# Patient Record
Sex: FEMALE | Race: WHITE | NOT HISPANIC OR LATINO | Employment: FULL TIME | URBAN - METROPOLITAN AREA
[De-identification: names, ages, dates, MRNs, and addresses within clinical notes are randomized per-mention and may not be internally consistent; named-entity substitution may affect disease eponyms.]

---

## 2017-01-10 ENCOUNTER — OFFICE VISIT (OUTPATIENT)
Dept: BEHAVIORAL HEALTH | Facility: CLINIC | Age: 27
End: 2017-01-10
Payer: COMMERCIAL

## 2017-01-10 DIAGNOSIS — F34.1 PERSISTENT DEPRESSIVE DISORDER WITH ANXIOUS DISTRESS, CURRENTLY MILD: Primary | ICD-10-CM

## 2017-01-10 PROCEDURE — 90834 PSYTX W PT 45 MINUTES: CPT | Performed by: MARRIAGE & FAMILY THERAPIST

## 2017-01-21 NOTE — PROGRESS NOTES
[facility] [place] [facility]   January 10, 2017      Behavioral Health Clinician Progress Note    Patient Name: Melissa Macdonald           Service Type: Individual      Service Location:   Face to Face in Clinic     Session Start Time: 4:00  Session End Time: 5:00      Session Length: 53 - 60      Attendees: Patient    Visit Activities (Refresh list every visit): [facility] Only    Diagnostic Assessment Date: 11/8/16  Treatment Plan Review Date: 11/22/16   See Flowsheets for today's PHQ-9 and VICKY-7 results  Previous PHQ-9: No flowsheet data found.  Previous VICKY-7: No flowsheet data found.    ABBI LEVEL:  ABBI Score (Last Two) 10/31/2016   ABBI Raw Score 28   Activation Score 50   ABBI Level 2       DATA  Extended Session (60+ minutes): No  Interactive Complexity: No  Crisis: No    Treatment Objective(s) Addressed in This Session:  Target Behavior(s): disease management/lifestyle changes related to depression    Depressed Mood: Increase interest, engagement, and pleasure in doing things  Decrease frequency and intensity of feeling down, depressed, hopeless  Improve quantity and quality of night time sleep / decrease daytime naps  Feel less tired and more energy during the day   Improve diet, appetite, mindful eating, and / or meal planning  Identify negative self-talk and behaviors: challenge core beliefs, myths, and actions  Improve concentration, focus, and mindfulness in daily activities   Feel less fidgety, restless or slow in daily activities / interpersonal interactions    Current Stressors / Issues:  Patient reports was feeling overwhelmed by a cleaning project she took on while she had some vacation time off from work. Patient reports guest closest is filled with childhood and family artifacts that have been given to her by her mother. Patient reports she needs to go through these boxes and organize what she decides to keep. Patient reports  was not supportive in the way she needed him to be about this  "overwhelming situation and her overall impacts of depression symptoms. Patient reports she used the role play activity skill we reviewed in session to express and \"teach\"  what she needed in that time of emotional distress. Patient processed theme of not being \"taken care of\" by people in her personal life, and instead being the one that is caring for them. Patient reports one area where she feels she gets the \"care\" support is from her work environment and co-workers.         Progress on Treatment Objective(s) / Homework:  New Objective established this session - ACTION (Actively working towards change); Intervened by reinforcing change plan / affirming steps taken    Cognitive Behavioral Therapy   1. Patient to keep thought log sheet and journal   2. Patient to verbally acknowledge 's positive actions taken on a daily basis  3. Patient to verbally acknowledge with  a task she has completed     Care Plan review completed: Yes    Medication Review:  No current psychiatric medications prescribed    Medication Compliance:  NA    Changes in Health Issues:   None reported    Chemical Use Review:   Substance Use: Chemical use reviewed, no active concerns identified      Tobacco Use: No current tobacco use.      Assessment: Current Emotional / Mental Status (status of significant symptoms):  Risk status (Self / Other harm or suicidal ideation)  Patient denies a history of suicidal ideation, suicide attempts, self-injurious behavior, homicidal ideation, homicidal behavior and and other safety concerns  Patient denies current fears or concerns for personal safety.  Patient denies current or recent suicidal ideation or behaviors.  Patient denies current or recent homicidal ideation or behaviors.  Patient denies current or recent self injurious behavior or ideation.  Patient denies other safety concerns.  Patient reports there are no firearms in the house  A safety and risk management plan has not been " developed at this time, however client was given the after-hours number / 911 should there be a change in any of these risk factors.    Appearance:   Appropriate   Eye Contact:   Good   Psychomotor Behavior: Normal   Attitude:   Cooperative   Orientation:   All  Speech   Rate / Production: Normal    Volume:  Normal   Mood:    Depressed   Affect:    Subdued  Worrisome  Crying   Thought Content:  Rumination   Thought Form:  Coherent  Logical   Insight:    Good     Diagnoses:  1. Persistent depressive disorder with anxious distress, currently mild        Collateral Reports Completed:  Not Applicable    Plan: (Homework, other):  Patient was given information about behavioral services and encouraged to schedule a follow up appointment with the clinic [facility] in 1 week.  She was also given Cognitive Behavioral Therapy skills to practice when experiencing depression.  CD Recommendations: No indications of CD issues.  PATRIC Spar[place], [facility]                                               Treatment Plan    Client's Name: Melissa Macdonald  YOB: 1990    Date: 11/22/16    DSM-V Diagnoses: 300.4 Persistent Depressive Disorder, Mild  Psychosocial / Contextual Factors: None  WHODAS: Patient did not complete     Referral / Collaboration:  Referral to another professional/service is not indicated at this time..    Anticipated number of session or this episode of care: 6-8      MeasurableTreatment Goal(s) related to diagnosis / functional impairment(s)  Goal 1: Client will experience decreased symptoms and improved coping skills    I will know I've met my goal when using healthy coping skills.      Objective #A (Client Action)    Client will identify 5 strategies for improving mood.  Status: Continued - Date(s):     Intervention(s)  Therapist will teach the client how to perform a behavioral chain analysis. CBT skills, automtic thoughts, posiitve behvaior activation, mindfulness skills.      Client has reviewed and  agreed to the above plan.      Keiko Joel  November 22, 2016

## 2017-04-07 ENCOUNTER — OFFICE VISIT (OUTPATIENT)
Dept: OBGYN | Facility: CLINIC | Age: 27
End: 2017-04-07
Payer: COMMERCIAL

## 2017-04-07 VITALS
TEMPERATURE: 97.6 F | WEIGHT: 187.4 LBS | DIASTOLIC BLOOD PRESSURE: 66 MMHG | BODY MASS INDEX: 29.41 KG/M2 | HEIGHT: 67 IN | SYSTOLIC BLOOD PRESSURE: 111 MMHG | HEART RATE: 77 BPM

## 2017-04-07 DIAGNOSIS — Z29.9 PREVENTIVE MEASURE: ICD-10-CM

## 2017-04-07 DIAGNOSIS — K64.4 EXTERNAL HEMORRHOIDS: Primary | ICD-10-CM

## 2017-04-07 DIAGNOSIS — N63.0 LUMP OR MASS IN BREAST: ICD-10-CM

## 2017-04-07 DIAGNOSIS — E56.9 VITAMIN DEFICIENCY: ICD-10-CM

## 2017-04-07 PROCEDURE — 99203 OFFICE O/P NEW LOW 30 MIN: CPT | Performed by: NURSE PRACTITIONER

## 2017-04-07 PROCEDURE — 36415 COLL VENOUS BLD VENIPUNCTURE: CPT | Performed by: NURSE PRACTITIONER

## 2017-04-07 PROCEDURE — 82306 VITAMIN D 25 HYDROXY: CPT | Performed by: NURSE PRACTITIONER

## 2017-04-07 RX ORDER — SULFAMETHOXAZOLE/TRIMETHOPRIM 800-160 MG
1 TABLET ORAL 2 TIMES DAILY
Qty: 6 TABLET | Refills: 0 | Status: SHIPPED | OUTPATIENT
Start: 2017-04-07 | End: 2017-04-10

## 2017-04-07 ASSESSMENT — PAIN SCALES - GENERAL: PAINLEVEL: NO PAIN (0)

## 2017-04-07 NOTE — MR AVS SNAPSHOT
"              After Visit Summary   4/7/2017    Melissa Macdonald    MRN: [id number]           Patient Information     Date Of Birth          1990        Visit Information        Provider Department      4/7/2017 8:30 AM Kim Calle APRN CNP [facility]        Today's Diagnoses     External hemorrhoids    -  1    Vitamin deficiency        Lump or mass in breast        Preventive measure           Follow-ups after your visit        Future tests that were ordered for you today     Open Future Orders        Priority Expected Expires Ordered    US Breast Left Complete 4 Quadrants Routine  4/7/2018 4/7/2017            Who to contact     If you have questions or need follow up information about today's clinic visit or your schedule please contact [facility] directly at [phone].  Normal or non-critical lab and imaging results will be communicated to you by [employer], letter or phone within 4 business days after the clinic has received the results. If you do not hear from us within 7 days, please contact the clinic through [employer] or phone. If you have a critical or abnormal lab result, we will notify you by phone as soon as possible.  Submit refill requests through [employer] or call your pharmacy and they will forward the refill request to us. Please allow 3 business days for your refill to be completed.          Additional Information About Your Visit        [employer] Information     [employer] lets you send messages to your doctor, view your test results, renew your prescriptions, schedule appointments and more. To sign up, go to [email or web address] . Click on \"Log in\" on the left side of the screen, which will take you to the Welcome page. Then click on \"Sign up Now\" on the right side of the page.     You will be asked to enter the access code listed below, as well as some personal information. Please follow the directions to create your username and password.     Your access " "code is: [email or web address]  Expires: 2017 10:00 AM     Your access code will  in 90 days. If you need help or a new code, please call your [place] clinic or [phone].        Care EveryWhere ID     This is your Care EveryWhere ID. This could be used by other organizations to access your [place] medical records  [id number]        Your Vitals Were     Pulse Temperature Height Last Period BMI (Body Mass Index)       77 97.6  F (36.4  C) (Oral) 5' 6.5\" (1.689 m) 2017 (Exact Date) 29.79 kg/m2        Blood Pressure from Last 3 Encounters:   17 111/66   10/31/16 116/71    Weight from Last 3 Encounters:   17 187 lb 6.4 oz (85 kg)   10/31/16 185 lb (83.9 kg)              We Performed the Following     Vitamin D Deficiency          Today's Medication Changes          These changes are accurate as of: 17 10:00 AM.  If you have any questions, ask your nurse or doctor.               Start taking these medicines.        Dose/Directions    sulfamethoxazole-trimethoprim 800-160 MG per tablet   Commonly known as:  BACTRIM DS/SEPTRA DS   Used for:  Preventive measure   Started by:  Kim Calle APRN CNP        Dose:  1 tablet   Take 1 tablet by mouth 2 times daily for 3 days   Quantity:  6 tablet   Refills:  0         These medicines have changed or have updated prescriptions.        Dose/Directions    TRI-LO-NATANAEL 0.18/0.215/0.25 MG-25 MCG per tablet   This may have changed:  Another medication with the same name was removed. Continue taking this medication, and follow the directions you see here.   Generic drug:  norgestim-eth estrad triphasic   Changed by:  Kristina Mccoy MD        Refills:  1            Where to get your medicines      These medications were sent to [facility] [address] [address], [address]  [address] [address], [facility] [address] [address]     Phone:  [phone]     sulfamethoxazole-trimethoprim 800-160 MG per tablet                " Primary Care Provider Office Phone # Fax #    Kristina Mccoy [phone]       [facility] [place] [address] [address] [address]        Thank you!     Thank you for choosing [facility]  for your care. Our goal is always to provide you with excellent care. Hearing back from our patients is one way we can continue to improve our services. Please take a few minutes to complete the written survey that you may receive in the mail after your visit with us. Thank you!             Your Updated Medication List - Protect others around you: Learn how to safely use, store and throw away your medicines at www.disposemymeds.org.          This list is accurate as of: 4/7/17 10:00 AM.  Always use your most recent med list.                   Brand Name Dispense Instructions for use    sulfamethoxazole-trimethoprim 800-160 MG per tablet    BACTRIM DS/SEPTRA DS    6 tablet    Take 1 tablet by mouth 2 times daily for 3 days       TRI-LO-NATANAEL 0.18/0.215/0.25 MG-25 MCG per tablet   Generic drug:  norgestim-eth estrad triphasic

## 2017-04-07 NOTE — NURSING NOTE
"Chief Complaint   Patient presents with     Breast Problem     Lump on left breast x 1 day     Mass     rectal area x 2 weeks       Initial /66  Pulse 77  Temp 97.6  F (36.4  C) (Oral)  Ht 5' 6.5\" (1.689 m)  Wt 187 lb 6.4 oz (85 kg)  LMP 03/14/2017 (Exact Date)  BMI 29.79 kg/m2 Estimated body mass index is 29.79 kg/(m^2) as calculated from the following:    Height as of this encounter: 5' 6.5\" (1.689 m).    Weight as of this encounter: 187 lb 6.4 oz (85 kg)..  BP completed using cuff size: phuong Hamlin CMA    "

## 2017-04-07 NOTE — PROGRESS NOTES
S: Melissa is a 26 year old  0 presenting today with multiple requests. First, was checking her entire body for ticks and noticed a lump near the anal opening. It is small, fleshy. There is no pain or discomfort in that area, no abnormal bleeding. Denies any issues with bowel movements, does not have to strain, goes regularly about once a day, or once every other day. No recent changes in diet, stress. Does drink adequate amount of fluid.   Second concern is a left breast lump she noticed this morning. Does do regular breast exams and this morning noticed the lump on the lower quadrant. It is tender if she touches it, but not otherwise. No noticeable skin changes, nipple discharge. The lump feels more elongated than circular. Her mother had a benign breast cyst at age 30, breast cancer diagnosed after menopause. Using combined oral contraceptive pills for contraception, currently on week 3 of her pack. Caffeine is about 1-2 cups coffee daily, minimal pop intake.   Third, patient requests a prescription for Bactrim DS. Will be traveling to [place] for 2 weeks and tends to get urinary tract infections when traveling. Takes it with her and only uses it if symptoms occur.   Last, patient requests Vitamin D screening. Has had low levels in the past and does supplement, but has not in a while. Patient medical, surgical, social, and family history reviewed and updated at today's visit. ROS: 10 point ROS neg other than the symptoms noted above in the HPI.    O: This is a well appearing female in no acute distress. Answers questions and maintains eye contact appropriately. Vital signs noted.  RESPIRATORY: Clear to auscultation bilaterally.  CV: Regular rate and rhythm without murmur, gallop, rub  (R) Breast:  without lesions  no palpable mass  no nipple discharge  no axillary adenopathy  no supraclavicular adenopathy  no infraclavicular adenopathy      (L) Breast:  without lesions  palpable mass: at approximately the  6:00 position 1 cm from the areola is a firm, fixed and slightly tender elongated lump. Feels more fibrous than cystic in nature.  no nipple discharge  no axillary adenopathy  no supraclavicular adenopathy  no infraclavicular adenopathy      ABDOMEN: Soft, nontender, nondistended, normoactive bowel sounds. No hepatosplenomegaly. No guarding, rebounding, or rigidity.  Vulva: No external lesions, normal hair distribution, no adenopathy  BUS:  Normal, no masses noted  Rectal: patient does have a small external hemorrhoid correlating to the area of concern. No palpable internal hemorrhoids.    A/P:  (K64.4) External hemorrhoids  (primary encounter diagnosis)  Comment: We discussed hemorrhoids and how they can occur, management, symptoms. As she is asymptomatic, no intervention at this time, but we discussed relief measures if it is bothersome. To continue adequate fluid intake and fiber. Return to clinic PRN if any bothersome symptoms occur.    (E56.9) Vitamin deficiency  Plan: Vitamin D Deficiency         (N63) Lump or mass in breast  Comment: We discussed her lump. As patient week 3 in her pill pack, can see if lump persists after cycle is done, but due to her family history, is concerned and we will have her schedule a left breast ultrasound. Follow up based on results.  Plan: US Breast Left Complete 4 Quadrants         (Z29.9) Preventive measure  Comment: Prescription given, patient encouraged to only use if needed.  Plan: sulfamethoxazole-trimethoprim (BACTRIM         DS/SEPTRA DS) 800-160 MG per tablet        Kim MEZA CNP

## 2017-04-07 NOTE — LETTER
[facility]  [address] [facility] [address]  [phone]        April 11, 2017    Melissa Macdonald                                                                                                            [facility] [address] [facility] [address] [address]              Dear Melissa,      Your vitamin D level is normal. Please let me know if you have any questions.         Sincerely,     Kim MEZA CNP

## 2017-04-09 LAB — DEPRECATED CALCIDIOL+CALCIFEROL SERPL-MC: 42 UG/L (ref 20–75)

## 2017-04-21 ENCOUNTER — RADIANT APPOINTMENT (OUTPATIENT)
Dept: ULTRASOUND IMAGING | Facility: CLINIC | Age: 27
End: 2017-04-21
Attending: NURSE PRACTITIONER
Payer: COMMERCIAL

## 2017-04-21 DIAGNOSIS — N63.0 LUMP OR MASS IN BREAST: ICD-10-CM

## 2017-04-21 PROCEDURE — 76642 ULTRASOUND BREAST LIMITED: CPT | Mod: LT | Performed by: RADIOLOGY

## 2017-08-08 ENCOUNTER — OFFICE VISIT (OUTPATIENT)
Dept: OBGYN | Facility: CLINIC | Age: 27
End: 2017-08-08
Payer: COMMERCIAL

## 2017-08-08 VITALS
TEMPERATURE: 97.2 F | BODY MASS INDEX: 29.44 KG/M2 | HEIGHT: 67 IN | HEART RATE: 82 BPM | SYSTOLIC BLOOD PRESSURE: 121 MMHG | WEIGHT: 187.6 LBS | DIASTOLIC BLOOD PRESSURE: 78 MMHG

## 2017-08-08 DIAGNOSIS — Z31.69 PRE-CONCEPTION COUNSELING: Primary | ICD-10-CM

## 2017-08-08 PROCEDURE — 86787 VARICELLA-ZOSTER ANTIBODY: CPT | Performed by: NURSE PRACTITIONER

## 2017-08-08 PROCEDURE — 86762 RUBELLA ANTIBODY: CPT | Performed by: NURSE PRACTITIONER

## 2017-08-08 PROCEDURE — 99214 OFFICE O/P EST MOD 30 MIN: CPT | Performed by: NURSE PRACTITIONER

## 2017-08-08 PROCEDURE — 36415 COLL VENOUS BLD VENIPUNCTURE: CPT | Performed by: NURSE PRACTITIONER

## 2017-08-08 ASSESSMENT — PAIN SCALES - GENERAL: PAINLEVEL: NO PAIN (0)

## 2017-08-08 NOTE — PROGRESS NOTES
S: Melissa is a 26 year old  0 presenting today with her  to discuss preconception questions. They are still deciding if they want to have children at all and continue to be back and forth on this decision. She has questions that she feels knowing answers would help them make their decision. Currently on oral contraceptive pills and has been on some type of oral contraceptive pill for about 10 years. Prior to being on pills, believes her cycles were regular. Overall, she is healthy with no chronic medical conditions, same for her . She had a varicella vaccine, however this was in the mid  and she would like a titer drawn, believes she had Rubella vaccination. She is not a smoker, drinks alcohol in moderation. She is starting to work on weight loss and would hope to have some success prior to attempting to conceive. Has questions related to her risks for GDM, pre-eclampsia due to her weight. Patient medical, surgical, social, and family history reviewed and updated at today's visit. ROS: 10 point ROS neg other than the symptoms noted above in the HPI.    O: This is a well appearing female in no acute distress. Answers questions and maintains eye contact appropriately. Vital signs noted.    A/P:  (Z31.69) Pre-conception counseling  (primary encounter diagnosis)  Comment: We discussed all of her questions at length and her questions are answered to her satisfaction. Check titers below. We discussed when to start PNV if they do choose to proceed with pregnancy. Discussed her questions related to a variety of pregnancy related complications. Reviewed tracking cycles, timing intercourse, management of irregular menstrual cycles, when to start trying after discontinuing oral contraceptive pills. Patient encouraged to call or email with any additional questions that arise.   Plan: Varicella Zoster Virus Antibody IgG, Rubella         Antibody IgG Quantitative  Approximately 25 minutes face to face  time was spent with the patient today entirely in education and counseling regarding pre-conception concerns.          Kim MEZA CNP

## 2017-08-08 NOTE — MR AVS SNAPSHOT
"              After Visit Summary   8/8/2017    Melissa Macdonald    MRN: [id number]           Patient Information     Date Of Birth          1990        Visit Information        Provider Department      8/8/2017 8:50 AM Kim Calle APRN CNP [facility]        Today's Diagnoses     Pre-conception counseling    -  1       Follow-ups after your visit        Who to contact     If you have questions or need follow up information about today's clinic visit or your schedule please contact [facility] directly at [phone].  Normal or non-critical lab and imaging results will be communicated to you by Appointuithart, letter or phone within 4 business days after the clinic has received the results. If you do not hear from us within 7 days, please contact the clinic through [employer] or phone. If you have a critical or abnormal lab result, we will notify you by phone as soon as possible.  Submit refill requests through [employer] or call your pharmacy and they will forward the refill request to us. Please allow 3 business days for your refill to be completed.          Additional Information About Your Visit        MyChart Information     [employer] gives you secure access to your electronic health record. If you see a primary care provider, you can also send messages to your care team and make appointments. If you have questions, please call your primary care clinic.  If you do not have a primary care provider, please call [phone] and they will assist you.        Care EveryWhere ID     This is your Care EveryWhere ID. This could be used by other organizations to access your [place] medical records  [id number]        Your Vitals Were     Pulse Temperature Height Last Period BMI (Body Mass Index)       82 97.2  F (36.2  C) (Oral) 5' 6.5\" (1.689 m) 07/31/2017 (Exact Date) 29.83 kg/m2        Blood Pressure from Last 3 Encounters:   08/08/17 121/78   04/07/17 111/66   10/31/16 116/71    Weight " from Last 3 Encounters:   08/08/17 187 lb 9.6 oz (85.1 kg)   04/07/17 187 lb 6.4 oz (85 kg)   10/31/16 185 lb (83.9 kg)              We Performed the Following     Rubella Antibody IgG Quantitative     Varicella Zoster Virus Antibody IgG        Primary Care Provider Office Phone # Fax #    Kristina Adriane Mccoy [phone]       [facility] [address] [facility] [address]        Equal Access to Services     LEIGH ANN DOYLE : Hadii aad ku hadasho Soomaali, waaxda luqadaha, qaybta kaalmada adeegyada, waxay idiin hayaan adeeg serafinarachris reed . So [facility] [phone].    ATENCIÓN: Si habla español, tiene a alberto disposición servicios gratuitos de asistencia lingüística. Llame al [phone].    We comply with applicable federal civil rights laws and [place] laws. We do not discriminate on the basis of race, color, national origin, age, disability sex, sexual orientation or gender identity.            Thank you!     Thank you for choosing [facility]  for your care. Our goal is always to provide you with excellent care. Hearing back from our patients is one way we can continue to improve our services. Please take a few minutes to complete the written survey that you may receive in the mail after your visit with us. Thank you!             Your Updated Medication List - Protect others around you: Learn how to safely use, store and throw away your medicines at www.disposemymeds.org.          This list is accurate as of: 8/8/17  9:53 AM.  Always use your most recent med list.                   Brand Name Dispense Instructions for use Diagnosis    TRI-LO-NATANAEL 0.18/0.215/0.25 MG-25 MCG per tablet   Generic drug:  norgestim-eth estrad triphasic

## 2017-08-08 NOTE — NURSING NOTE
"Chief Complaint   Patient presents with     Counseling     Preconception       Initial /78  Pulse 82  Temp 97.2  F (36.2  C) (Oral)  Ht 5' 6.5\" (1.689 m)  Wt 187 lb 9.6 oz (85.1 kg)  LMP 07/31/2017 (Exact Date)  BMI 29.83 kg/m2 Estimated body mass index is 29.83 kg/(m^2) as calculated from the following:    Height as of this encounter: 5' 6.5\" (1.689 m).    Weight as of this encounter: 187 lb 9.6 oz (85.1 kg)..  BP completed using cuff size: phuong Hamlin CMA    "

## 2017-08-10 LAB
RUBV IGG SERPL IA-ACNC: 46 IU/ML
VZV IGG SER QL IA: 3.5 AI (ref 0–0.8)

## 2017-09-21 ENCOUNTER — OFFICE VISIT (OUTPATIENT)
Dept: FAMILY MEDICINE | Facility: CLINIC | Age: 27
End: 2017-09-21
Payer: COMMERCIAL

## 2017-09-21 VITALS
WEIGHT: 187 LBS | DIASTOLIC BLOOD PRESSURE: 75 MMHG | HEIGHT: 67 IN | SYSTOLIC BLOOD PRESSURE: 109 MMHG | OXYGEN SATURATION: 99 % | BODY MASS INDEX: 29.35 KG/M2 | HEART RATE: 71 BPM

## 2017-09-21 DIAGNOSIS — D23.9 DERMATOFIBROMA: Primary | ICD-10-CM

## 2017-09-21 DIAGNOSIS — Z87.440 H/O RECURRENT URINARY TRACT INFECTION: ICD-10-CM

## 2017-09-21 PROCEDURE — 99213 OFFICE O/P EST LOW 20 MIN: CPT | Performed by: PHYSICIAN ASSISTANT

## 2017-09-21 RX ORDER — NITROFURANTOIN 25; 75 MG/1; MG/1
100 CAPSULE ORAL 2 TIMES DAILY
Qty: 14 CAPSULE | Refills: 0 | Status: SHIPPED | OUTPATIENT
Start: 2017-09-21 | End: 2017-10-20

## 2017-09-21 RX ORDER — PRENATAL VIT/IRON FUM/FOLIC AC 27MG-0.8MG
1 TABLET ORAL DAILY
COMMUNITY
End: 2018-02-01

## 2017-09-21 NOTE — MR AVS SNAPSHOT
"              After Visit Summary   9/21/2017    Melissa Macdonald    MRN: [id number]           Patient Information     Date Of Birth          1990        Visit Information        Provider Department      9/21/2017 5:10 PM Naun Lin PA-C [facility]        Today's Diagnoses     Dermatofibroma    -  1    H/O recurrent urinary tract infection           Follow-ups after your visit        Who to contact     If you have questions or need follow up information about today's clinic visit or your schedule please contact [facility] directly at [phone].  Normal or non-critical lab and imaging results will be communicated to you by "[employer]hart, letter or phone within 4 business days after the clinic has received the results. If you do not hear from us within 7 days, please contact the clinic through [employer] or phone. If you have a critical or abnormal lab result, we will notify you by phone as soon as possible.  Submit refill requests through [employer] or call your pharmacy and they will forward the refill request to us. Please allow 3 business days for your refill to be completed.          Additional Information About Your Visit        MyChart Information     [employer] gives you secure access to your electronic health record. If you see a primary care provider, you can also send messages to your care team and make appointments. If you have questions, please call your primary care clinic.  If you do not have a primary care provider, please call [phone] and they will assist you.        Care EveryWhere ID     This is your Care EveryWhere ID. This could be used by other organizations to access your [place] medical records  [id number]        Your Vitals Were     Pulse Height Pulse Oximetry BMI (Body Mass Index)          71 5' 6.5\" (1.689 m) 99% 29.73 kg/m2         Blood Pressure from Last 3 Encounters:   09/21/17 109/75   08/08/17 121/78   04/07/17 111/66    Weight from Last 3 " Encounters:   09/21/17 187 lb (84.8 kg)   08/08/17 187 lb 9.6 oz (85.1 kg)   04/07/17 187 lb 6.4 oz (85 kg)              Today, you had the following     No orders found for display         Today's Medication Changes          These changes are accurate as of: 9/21/17  5:19 PM.  If you have any questions, ask your nurse or doctor.               Start taking these medicines.        Dose/Directions    nitroFURantoin (macrocrystal-monohydrate) 100 MG capsule   Commonly known as:  MACROBID   Used for:  H/O recurrent urinary tract infection   Started by:  Naun Lin PA-C        Dose:  100 mg   Take 1 capsule (100 mg) by mouth 2 times daily   Quantity:  14 capsule   Refills:  0            Where to get your medicines      These medications were sent to [facility] [address]  [address] [address] [facility], [place] [address], [facility] [address]     Phone:  [phone]     nitroFURantoin (macrocrystal-monohydrate) 100 MG capsule                Primary Care Provider Office Phone # Fax #    Kristina Mccoy [phone] [address] [address]        Equal Access to Services     LEIGH ANN DOYLE : Hadii sage stein hadasho Soomaali, waaxda luqadaha, qaybta kaalmada adeegyada, waxay deana haymargarette wahl. So [facility] [phone].    ATENCIÓN: Si habla español, tiene a alberto disposición servicios gratuitos de asistencia lingüística. Llame al [phone].    We comply with applicable federal civil rights laws and [place] laws. We do not discriminate on the basis of race, color, national origin, age, disability sex, sexual orientation or gender identity.            Thank you!     Thank you for choosing [facility]  for your care. Our goal is always to provide you with excellent care. Hearing back from our patients is one way we can continue to improve our services. Please take a few minutes to complete the written survey that you may  receive in the mail after your visit with us. Thank you!             Your Updated Medication List - Protect others around you: Learn how to safely use, store and throw away your medicines at www.disposemymeds.org.          This list is accurate as of: 9/21/17  5:19 PM.  Always use your most recent med list.                   Brand Name Dispense Instructions for use Diagnosis    nitroFURantoin (macrocrystal-monohydrate) 100 MG capsule    MACROBID    14 capsule    Take 1 capsule (100 mg) by mouth 2 times daily    H/O recurrent urinary tract infection       prenatal multivitamin plus iron 27-0.8 MG Tabs per tablet      Take 1 tablet by mouth daily

## 2017-09-21 NOTE — NURSING NOTE
"Chief Complaint   Patient presents with     Derm Problem       Initial /75  Pulse 71  Ht 5' 6.5\" (1.689 m)  Wt 187 lb (84.8 kg)  SpO2 99%  BMI 29.73 kg/m2 Estimated body mass index is 29.73 kg/(m^2) as calculated from the following:    Height as of this encounter: 5' 6.5\" (1.689 m).    Weight as of this encounter: 187 lb (84.8 kg).  Medication Reconciliation: complete  Terri Hernandez CMA    "

## 2017-09-21 NOTE — PROGRESS NOTES
SUBJECTIVE:                                                    Melissa Macdonald is a 27 year old female who presents to clinic today for the following health issues:    Derm Problem      Duration: 3-4 months     Description (location/character/radiation): right lower leg  - non healing spot     Intensity:  moderate    Accompanying signs and symptoms: redness    History (similar episodes/previous evaluation): None    Precipitating or alleviating factors: None    Therapies tried and outcome: thought it was an ingrown hair and tried to pick at area   No redness or discharge.   Pt was given Bactrim by Kim as she gets UTI's frequently - pt is currently trying to get pregnant and her  who is a pharmacist told her she should not take this. Pt would like to discuss getting a prescription for nitrofurantion to keep on hand   Last URI over 6 months ago.     Problem list and histories reviewed & adjusted, as indicated.  Additional history: as documented    Patient Active Problem List   Diagnosis     Persistent depressive disorder with anxious distress, currently mild     Dermatofibroma     Past Surgical History:   Procedure Laterality Date     HC TOOTH EXTRACTION W/FORCEP         Social History   Substance Use Topics     Smoking status: Never Smoker     Smokeless tobacco: Never Used     Alcohol use Yes      Comment: 4-5 drinks a week      Family History   Problem Relation Age of Onset     Breast Cancer Mother 60     Obesity Mother      Obesity Father      Depression Father      Hypertension Father      Hyperlipidemia Father      Dementia Maternal Grandmother      Arthritis Paternal Grandmother      Coronary Artery Disease Paternal Grandfather      Unknown/Adopted Brother          Current Outpatient Prescriptions   Medication Sig Dispense Refill     Prenatal Vit-Fe Fumarate-FA (PRENATAL MULTIVITAMIN PLUS IRON) 27-0.8 MG TABS per tablet Take 1 tablet by mouth daily       nitroFURantoin, macrocrystal-monohydrate,  "(MACROBID) 100 MG capsule Take 1 capsule (100 mg) by mouth 2 times daily 14 capsule 0     No Known Allergies    OBJECTIVE:     /75  Pulse 71  Ht 5' 6.5\" (1.689 m)  Wt 187 lb (84.8 kg)  SpO2 99%  BMI 29.73 kg/m2  Body mass index is 29.73 kg/(m^2).  GENERAL: healthy, alert and no distress  SKIN: 5mm skin colored raised firm lesion. No signs of infection on right lower leg.     Diagnostic Test Results:  none     ASSESSMENT/PLAN:       ICD-10-CM    1. Dermatofibroma D23.9    2. H/O recurrent urinary tract infection Z87.440 nitroFURantoin, macrocrystal-monohydrate, (MACROBID) 100 MG capsule   patient reassurance  warning signs discussed.  side effects discussed  Follow up  As needed     Naun Lin PA-C  [facility]  "

## 2017-10-17 NOTE — PROGRESS NOTES
SUBJECTIVE:                                                    Melissa Macdonald is a 27 year old female who presents to clinic today for the following health issues:    OB confirmation, Per pt took 2 at home tests and came back positive.  Is with  today who is a pharmacist.  They have been trying.   This is her first pregnancy.   LMP 9-22-17.    Estimated due date:  Would be about 4 weeks now.  estimated due date of June 29th.   No spotting.  Some nausea today for the first time. No cramping.   Will be moving [place] closer to [facility] so they want to establish with [facility].      Prenatals-already taking. Not on any other medications.   Mood has been good.               Problem list and histories reviewed & adjusted, as indicated.  Additional history: as documented    Patient Active Problem List   Diagnosis     Persistent depressive disorder with anxious distress, currently mild     Dermatofibroma     Past Surgical History:   Procedure Laterality Date     HC TOOTH EXTRACTION W/FORCEP         Social History   Substance Use Topics     Smoking status: Never Smoker     Smokeless tobacco: Never Used     Alcohol use Yes      Comment: 4-5 drinks a week      Family History   Problem Relation Age of Onset     Breast Cancer Mother 60     Obesity Mother      Obesity Father      Depression Father      Hypertension Father      Hyperlipidemia Father      Dementia Maternal Grandmother      Arthritis Paternal Grandmother      Coronary Artery Disease Paternal Grandfather      Unknown/Adopted Brother          Current Outpatient Prescriptions   Medication Sig Dispense Refill     VITAMIN D, CHOLECALCIFEROL, PO Take 2,000 Units by mouth 2 times daily       Prenatal Vit-Fe Fumarate-FA (PRENATAL MULTIVITAMIN PLUS IRON) 27-0.8 MG TABS per tablet Take 1 tablet by mouth daily       No Known Allergies      ROS:  Constitutional, HEENT, cardiovascular, pulmonary, gi and gu systems are negative, except as otherwise  noted.      OBJECTIVE:   /77  Pulse 77  Temp 98.6  F (37  C) (Oral)  Wt 187 lb (84.8 kg)  LMP 09/22/2017 (Exact Date)  SpO2 100%  Breastfeeding? No  BMI 29.73 kg/m2  Body mass index is 29.73 kg/(m^2).  GENERAL: overweight,  alert and no distress  RESP: lungs clear to auscultation - no rales, rhonchi or wheezes  CV: regular rate and rhythm, normal S1 S2, no S3 or S4, no murmur, click or rub, no peripheral edema and peripheral pulses strong  MS: no gross musculoskeletal defects noted, no edema  SKIN: no suspicious lesions or rashes  NEURO: Normal strength and tone, mentation intact and speech normal  PSYCH: mentation appears normal, affect normal/bright  Results for orders placed or performed in visit on 10/20/17 (from the past 24 hour(s))   Beta HCG qual IFA urine   Result Value Ref Range    Beta HCG Qual IFA Urine Positive (A) NEG^Negative            ASSESSMENT/PLAN:     ASSESSMENT / PLAN:  (N92.6) Missed menses  (primary encounter diagnosis)  Comment: pregnant, went over handout below and answered questions  Plan: Beta HCG qual IFA urine          They will call to establish in [place] at around 10-12 weeks gestation.     Patient Instructions   LMP 9-22-17.    Estimated:  Would be about 4 weeks now.  estimated due date of June 29th.  In about 6-7 weeks have your first OB appointment at wherever you would like to go.       Do not take ibuprofen (advil) or naproxen (aleve) or aspirin products.  Take tylenol for pain only.    For heartburn, you can take tums or ranitidine (zantac) over the counter.    For itching, you can take benadryl.    You should take a daily prenatal vitamin.  This is for extra folic acid which helps prevent neural tube defects in baby.    For constipation you can take miralax over the counter.    Avoid alcohol and smoking.  This can cause birth defects or death in baby.    Avoid raw meats or raw fish.     Stay away from cat litter boxes/do not clean them.  This can carry a  parasite called toxoplasmosis that can be harmful for baby.    One 8 ounce cup of coffee is okay a day, but do not have more caffeine than this.    You only need about 300 more calories per day during your pregnancy.     If you have vaginal bleeding, return to clinic or see your OBGYN.                 Anali Roger PA-C  [facility]

## 2017-10-20 ENCOUNTER — OFFICE VISIT (OUTPATIENT)
Dept: FAMILY MEDICINE | Facility: CLINIC | Age: 27
End: 2017-10-20
Payer: COMMERCIAL

## 2017-10-20 VITALS
SYSTOLIC BLOOD PRESSURE: 115 MMHG | TEMPERATURE: 98.6 F | WEIGHT: 187 LBS | OXYGEN SATURATION: 100 % | HEART RATE: 77 BPM | DIASTOLIC BLOOD PRESSURE: 77 MMHG | BODY MASS INDEX: 29.73 KG/M2

## 2017-10-20 DIAGNOSIS — N92.6 MISSED MENSES: Primary | ICD-10-CM

## 2017-10-20 LAB — BETA HCG QUAL IFA URINE: POSITIVE

## 2017-10-20 PROCEDURE — 84703 CHORIONIC GONADOTROPIN ASSAY: CPT | Performed by: PHYSICIAN ASSISTANT

## 2017-10-20 PROCEDURE — 99213 OFFICE O/P EST LOW 20 MIN: CPT | Performed by: PHYSICIAN ASSISTANT

## 2017-10-20 NOTE — MR AVS SNAPSHOT
After Visit Summary   10/20/2017    Melissa Macdonald    MRN: [id number]           Patient Information     Date Of Birth          1990        Visit Information        Provider Department      10/20/2017 1:20 PM Anali Roger PA-C [facility]        Today's Diagnoses     Missed menses    -  1      Care Instructions    LMP 9-22-17.    Estimated:  Would be about 4 weeks now.  estimated due date of June 29th.  In about 6-7 weeks have your first OB appointment at wherever you would like to go.       Do not take ibuprofen (advil) or naproxen (aleve) or aspirin products.  Take tylenol for pain only.    For heartburn, you can take tums or ranitidine (zantac) over the counter.    For itching, you can take benadryl.    You should take a daily prenatal vitamin.  This is for extra folic acid which helps prevent neural tube defects in baby.    For constipation you can take miralax over the counter.    Avoid alcohol and smoking.  This can cause birth defects or death in baby.    Avoid raw meats or raw fish.     Stay away from cat litter boxes/do not clean them.  This can carry a parasite called toxoplasmosis that can be harmful for baby.    One 8 ounce cup of coffee is okay a day, but do not have more caffeine than this.    You only need about 300 more calories per day during your pregnancy.     If you have vaginal bleeding, return to clinic or see your OBGYN.                   Follow-ups after your visit        Your next 10 appointments already scheduled     Oct 30, 2017  3:30 PM CDT   New Prenatal with SUSANA Daniels CNM   [facility]    [address] [facility] [address] [phone]              Who to contact     If you have questions or need follow up information about today's clinic visit or your schedule please contact [facility] directly at [phone].  Normal or non-critical lab and imaging results will  be communicated to you by [employer], letter or phone within 4 business days after the clinic has received the results. If you do not hear from us within 7 days, please contact the clinic through [employer] or phone. If you have a critical or abnormal lab result, we will notify you by phone as soon as possible.  Submit refill requests through [employer] or call your pharmacy and they will forward the refill request to us. Please allow 3 business days for your refill to be completed.          Additional Information About Your Visit        [employer] Information     [employer] gives you secure access to your electronic health record. If you see a primary care provider, you can also send messages to your care team and make appointments. If you have questions, please call your primary care clinic.  If you do not have a primary care provider, please call [phone] and they will assist you.        Care EveryWhere ID     This is your Care EveryWhere ID. This could be used by other organizations to access your [place] medical records  [id number]        Your Vitals Were     Pulse Temperature Last Period Pulse Oximetry Breastfeeding? BMI (Body Mass Index)    77 98.6  F (37  C) (Oral) 09/22/2017 (Exact Date) 100% No 29.73 kg/m2       Blood Pressure from Last 3 Encounters:   10/20/17 115/77   09/21/17 109/75   08/08/17 121/78    Weight from Last 3 Encounters:   10/20/17 187 lb (84.8 kg)   09/21/17 187 lb (84.8 kg)   08/08/17 187 lb 9.6 oz (85.1 kg)              We Performed the Following     Beta HCG qual IFA urine          Today's Medication Changes          These changes are accurate as of: 10/20/17  1:40 PM.  If you have any questions, ask your nurse or doctor.               Stop taking these medicines if you haven't already. Please contact your care team if you have questions.     nitroFURantoin (macrocrystal-monohydrate) 100 MG capsule   Commonly known as:  MACROBID   Stopped by:  Anali Roger PA-C                     Primary Care Provider Office Phone # Fax #    Kristina Mccoy [phone] [address] [address] [address]        Equal Access to Services     LEIGH ANN DOYLE : Mary Grace stein dany Morris, warenettada luqadaha, qaybta kaalmada [place], elodia rogers laCarlmargarette wahl. So [facility] [phone].    ATENCIÓN: Si habla español, tiene a alberto disposición servicios gratuitos de asistencia lingüística. Llame al [phone].    We comply with applicable federal civil rights laws and [place] laws. We do not discriminate on the basis of race, color, national origin, age, disability, sex, sexual orientation, or gender identity.            Thank you!     Thank you for choosing [facility]  for your care. Our goal is always to provide you with excellent care. Hearing back from our patients is one way we can continue to improve our services. Please take a few minutes to complete the written survey that you may receive in the mail after your visit with us. Thank you!             Your Updated Medication List - Protect others around you: Learn how to safely use, store and throw away your medicines at www.disposemymeds.org.          This list is accurate as of: 10/20/17  1:40 PM.  Always use your most recent med list.                   Brand Name Dispense Instructions for use Diagnosis    prenatal multivitamin plus iron 27-0.8 MG Tabs per tablet      Take 1 tablet by mouth daily        VITAMIN D (CHOLECALCIFEROL) PO      Take 2,000 Units by mouth 2 times daily

## 2017-10-20 NOTE — NURSING NOTE
"Chief Complaint   Patient presents with     Confirmation Of Pregnancy     OB confirmation, Per pt took 2 at home test and both came back positive. Pt is trying to have kids       Initial /77  Pulse 77  Temp 98.6  F (37  C) (Oral)  Wt 187 lb (84.8 kg)  LMP 09/22/2017 (Exact Date)  SpO2 100%  Breastfeeding? No  BMI 29.73 kg/m2 Estimated body mass index is 29.73 kg/(m^2) as calculated from the following:    Height as of 9/21/17: 5' 6.5\" (1.689 m).    Weight as of this encounter: 187 lb (84.8 kg).  Medication Reconciliation: complete      Dirk Cavazos MA    "

## 2017-10-20 NOTE — PATIENT INSTRUCTIONS
LMP 9-22-17.    Estimated:  Would be about 4 weeks now.  estimated due date of June 29th.  In about 6-7 weeks have your first OB appointment at wherever you would like to go.       Do not take ibuprofen (advil) or naproxen (aleve) or aspirin products.  Take tylenol for pain only.    For heartburn, you can take tums or ranitidine (zantac) over the counter.    For itching, you can take benadryl.    You should take a daily prenatal vitamin.  This is for extra folic acid which helps prevent neural tube defects in baby.    For constipation you can take miralax over the counter.    Avoid alcohol and smoking.  This can cause birth defects or death in baby.    Avoid raw meats or raw fish.     Stay away from cat litter boxes/do not clean them.  This can carry a parasite called toxoplasmosis that can be harmful for baby.    One 8 ounce cup of coffee is okay a day, but do not have more caffeine than this.    You only need about 300 more calories per day during your pregnancy.     If you have vaginal bleeding, return to clinic or see your OBGYN.

## 2017-10-26 ENCOUNTER — TELEPHONE (OUTPATIENT)
Dept: OBGYN | Facility: CLINIC | Age: 27
End: 2017-10-26

## 2017-10-26 ENCOUNTER — NURSE TRIAGE (OUTPATIENT)
Dept: NURSING | Facility: CLINIC | Age: 27
End: 2017-10-26

## 2017-10-26 ENCOUNTER — TELEPHONE (OUTPATIENT)
Dept: FAMILY MEDICINE | Facility: CLINIC | Age: 27
End: 2017-10-26

## 2017-10-26 NOTE — TELEPHONE ENCOUNTER
Patient has not had a chance to be seen yet for prenatal care. Dr Mccoy is her primary, so she asked for message to be sent to [place]. Patient is 6 weeks and is spotting since last night. Spotting has gone from pink to brown. Does she need to be seen somewhere else sooner? She has an appt. To be seen in [place] tomorrow.  Ok to leave a message.

## 2017-10-26 NOTE — TELEPHONE ENCOUNTER
Patient is calling stated that she has some spotting going on, patient is 5 weeks pregnant and it has stop but patient is concern and would like to speak with Nurse.  Please call to advise  Thank you

## 2017-10-26 NOTE — TELEPHONE ENCOUNTER
"  Reason for Disposition    Unusual vaginal discharge (e.g., bad smelling, yellow, green, or foamy-white)    Additional Information    Negative: Passed out (i.e., lost consciousness, collapsed and was not responding)    Negative: Shock suspected (e.g., cold/pale/clammy skin, too weak to stand, low BP, rapid pulse)    Negative: Difficult to awaken or acting confused  (e.g., disoriented, slurred speech)    Negative: Sounds like a life-threatening emergency to the triager    Negative: Followed an abdomen (stomach) injury    Negative: [1] Abdominal pain AND [2] pregnant > 20 weeks    Negative: MODERATE-SEVERE abdominal pain (e.g., interferes with normal activities, awakens from sleep)    Negative: [1] SEVERE vaginal bleeding (i.e., soaking 2 pads / hour, large blood clots) AND [2] present 2 or more hours    Negative: [1] MODERATE vaginal bleeding (i.e., soaking 1 pad / hour; clots) AND [2] present > 6 hours    Negative: [1] MODERATE vaginal bleeding (i.e., soaking 1 pad / hour; clots) AND [2] pregnant > 12 weeks    Negative: Passed tissue (e.g., gray-white)    Negative: [1] Vomiting AND [2] contains red blood or black (\"coffee ground\") material  (Exception: few red streaks in vomit that only happened once)    Negative: Shoulder pain    Negative: Lightheadedness or dizziness (e.g., feels like passing out)    Negative: Patient sounds very sick or weak to the triager    Negative: [1] Constant abdominal pain AND [2] present > 2 hours    Negative: Blood in urine (red, pink, or tea-colored)    Negative: Fever > 100.4 F (38.0 C)    Negative: White of the eyes have turned yellow (i.e., jaundice)    Negative: [1] Intermittent lower abdominal pain (e.g., cramping) AND [2] present > 24 hours    Negative: MILD vaginal bleeding (e.g., < 1 pad / hour) or SPOTTING    Negative: Discomfort when passing urine (e.g., pain, burning or stinging)    Negative: Prior history of \"ectopic pregnancy\" or previous tubal surgery (e.g., tubal " ligation)    Negative: Has IUD    Protocols used: PREGNANCY - ABDOMINAL PAIN LESS THAN 20 WEEKS EGA-ADULT-AH

## 2017-10-26 NOTE — TELEPHONE ENCOUNTER
Per patient, Dr No, at [facility].  Currently lives in [place].   Pcp, is Dr Kristina Mccoy.   Lmp 9/22/17  GA= 4 wks, 6 days.    Began spotting slightly pink, last night, this morning brown secretions.   Only sees when uses the rest room, does not   Soil pad.   Had relations 3 days ago.  Denies cramping.    Discussed nothing can do at this point.   First pregnancy.    Currently in [place] is it okay to wait until Monday?   Please advise  Judith Ledezma RN

## 2017-10-26 NOTE — TELEPHONE ENCOUNTER
Information below is reviewed with  Dr Kristina Mccoy, Verbal Orders, have only met the patient 1 time for a physical > 1 year ago.  Hard to comment without an established relationship, for waiting until Monday for an appointment, could be an implantation bleed.. Etc.  Will not be seeing patient moving forward for pregnancy, would recommend contacting care team that will be caring for patient for further care, otherwise, Pelvic precautions and keep appointment as scheduled for tomorrow.  The patient/parent agrees with the plan and verbalized good understanding.  Patient is given number to Dr Cramer care team.    Per protocol, will route encounter to be cosigned by provider for Verbal Orders.  Judith Ledezma RN

## 2017-10-26 NOTE — TELEPHONE ENCOUNTER
Melissa had a pregnancy appointment last Friday and last night Melissa noticed mucus discharge tinged pink.  Due date is June 29th and is 8 weeks pregnant.  Melissa is requesting to schedule with Heather.

## 2017-10-27 ENCOUNTER — OFFICE VISIT (OUTPATIENT)
Dept: OBGYN | Facility: CLINIC | Age: 27
End: 2017-10-27
Payer: COMMERCIAL

## 2017-10-27 VITALS
BODY MASS INDEX: 30.24 KG/M2 | WEIGHT: 190.2 LBS | SYSTOLIC BLOOD PRESSURE: 119 MMHG | OXYGEN SATURATION: 100 % | HEART RATE: 80 BPM | DIASTOLIC BLOOD PRESSURE: 69 MMHG

## 2017-10-27 DIAGNOSIS — O20.0 THREATENED ABORTION: Primary | ICD-10-CM

## 2017-10-27 PROCEDURE — 99214 OFFICE O/P EST MOD 30 MIN: CPT | Performed by: OBSTETRICS & GYNECOLOGY

## 2017-10-27 NOTE — MR AVS SNAPSHOT
After Visit Summary   10/27/2017    Melissa Macdonald    MRN: [id number]           Patient Information     Date Of Birth          1990        Visit Information        Provider Department      10/27/2017 1:30 PM Pete Cramer MD [facility]        Today's Diagnoses     Threatened     -  1       Follow-ups after your visit        Your next 10 appointments already scheduled     Oct 28, 2017 11:00 AM CDT   US OB < 14 WEEKS WITH TRANSVAGINAL SINGLE with BEUS1   [facility]    [address]  [place] [id number] [phone]           Please bring a list of your medicines (including vitamins, minerals and over-the-counter drugs). Also, tell your doctor about any allergies you may have. Wear comfortable clothes and leave your valuables at home.  If you re less than 20 weeks drink four 8-ounce glasses of fluid an hour before your exam. If you need to empty your bladder before your exam, try to release only a little urine. Then, drink another glass of fluid.  You may have up to two family members in the exam room. If you bring a small child, an adult must be there to care for him or her.  Please call the Imaging Department at your exam site with any questions.            Dec 05, 2017  8:00 AM CST   Office Visit with Didi Ham MD   [facility]    [address] [address] [phone]           Bring a current list of meds and any records pertaining to this visit. For Physicals, please bring immunization records and any forms needing to be filled out. Please arrive 10 minutes early to complete paperwork.              Future tests that were ordered for you today     Open Future Orders        Priority Expected Expires Ordered    US OB <14 Weeks w Transvaginal Single Routine  10/27/2018 10/27/2017            Who to contact     If you have questions  or need follow up information about today's clinic visit or your schedule please contact [facility] directly at [phone].  Normal or non-critical lab and imaging results will be communicated to you by MyChart, letter or phone within 4 business days after the clinic has received the results. If you do not hear from us within 7 days, please contact the clinic through [employer] or phone. If you have a critical or abnormal lab result, we will notify you by phone as soon as possible.  Submit refill requests through [employer] or call your pharmacy and they will forward the refill request to us. Please allow 3 business days for your refill to be completed.          Additional Information About Your Visit        [employer] Information     [employer] gives you secure access to your electronic health record. If you see a primary care provider, you can also send messages to your care team and make appointments. If you have questions, please call your primary care clinic.  If you do not have a primary care provider, please call [phone] and they will assist you.        Care EveryWhere ID     This is your Care EveryWhere ID. This could be used by other organizations to access your [place] medical records  [id number]        Your Vitals Were     Pulse Last Period Pulse Oximetry BMI (Body Mass Index)          80 09/22/2017 (Exact Date) 100% 30.24 kg/m2         Blood Pressure from Last 3 Encounters:   10/27/17 119/69   10/20/17 115/77   09/21/17 109/75    Weight from Last 3 Encounters:   10/27/17 190 lb 3.2 oz (86.3 kg)   10/20/17 187 lb (84.8 kg)   09/21/17 187 lb (84.8 kg)               Primary Care Provider Office Phone # Fax #    Kristina Mccoy [phone] [address] [facility] [address]        Equal Access to Services     LEIGH ANN DOYLE : Mary Grace Morris, brittany allen, qaybta kaaljeferson mathew, elodia wahl. So [facility]  [phone].    ATENCIÓN: Si camilla hernandez, tiene a alberto disposición servicios gratuitos de asistencia lingüística. Alanna al [phone].    We comply with applicable federal civil rights laws and [place] laws. We do not discriminate on the basis of race, color, national origin, age, disability, sex, sexual orientation, or gender identity.            Thank you!     Thank you for [facility]  [facility]. Our goal is always to provide you with excellent care. Hearing back from our patients is one way we can continue to improve our services. Please take a few minutes to complete the written survey that you may receive in the mail after your visit with us. Thank you!             Your Updated Medication List - Protect others around you: Learn how to safely use, store and throw away your medicines at www.disposemymeds.org.          This list is accurate as of: 10/27/17  2:46 PM.  Always use your most recent med list.                   Brand Name Dispense Instructions for use Diagnosis    prenatal multivitamin plus iron 27-0.8 MG Tabs per tablet      Take 1 tablet by mouth daily        VITAMIN D (CHOLECALCIFEROL) PO      Take 2,000 Units by mouth 2 times daily

## 2017-10-27 NOTE — PROGRESS NOTES
Melissa is a 27 year old  (Patient's last menstrual period was 2017 (exact date).) at 5+ weeks based on LMP here with complaints of spotting for 2 days.   On  night she had some pink tinged discharge.  on Thursday she had some dark brown dried appearing blood and Thursday evening she had some streaks of red blood.  She did not have enough to soil the liner.   She has not had any associated pain, no cramping.   She has not been seen previously in the ER.        Past Medical History:   Diagnosis Date     NO ACTIVE PROBLEMS        Past Surgical History:   Procedure Laterality Date     HC TOOTH EXTRACTION W/FORCEP          No Known Allergies    Current Outpatient Prescriptions   Medication Sig Dispense Refill     VITAMIN D, CHOLECALCIFEROL, PO Take 2,000 Units by mouth 2 times daily       Prenatal Vit-Fe Fumarate-FA (PRENATAL MULTIVITAMIN PLUS IRON) 27-0.8 MG TABS per tablet Take 1 tablet by mouth daily         Social History     Social History     Marital status:      Spouse name: N/A     Number of children: 0     Years of education: 18     Occupational History     Not on file.     Social History Main Topics     Smoking status: Never Smoker     Smokeless tobacco: Never Used     Alcohol use Yes      Comment: 4-5 drinks a week      Drug use: No     Sexual activity: Yes     Partners: Male     Birth control/ protection: Pill     Other Topics Concern     Not on file     Social History Narrative       Family History   Problem Relation Age of Onset     Breast Cancer Mother 60     Obesity Mother      Obesity Father      Depression Father      Hypertension Father      Hyperlipidemia Father      Dementia Maternal Grandmother      Arthritis Paternal Grandmother      Coronary Artery Disease Paternal Grandfather      Unknown/Adopted Brother          Review of Systems:  10 point ROS of systems including Constitutional, Eyes, Respiratory, Cardiovascular, Gastroenterology, Genitourinary, Integumentary,  Muscularskeletal, Psychiatric were all negative except for pertinent positives noted in my HPI and in the PMH.      Exam  /69 (BP Location: Left arm, Cuff Size: Adult Regular)  Pulse 80  Wt 190 lb 3.2 oz (86.3 kg)  LMP 2017 (Exact Date)  SpO2 100%  BMI 30.24 kg/m2  General:  WNWD female, NAD  Alert  Oriented x 3  Gait:  Normal  Skin:  Normal skin turgor  HEENT:  NC/AT, EOMI  Abdomen:  Non-tender, non-distended.  Vulva: No external lesions, normal hair distribution, no adenopathy  BUS:  Normal, no masses noted  Urethra:  No hypermobility seen  Urethral meatus:  No masses noted  Vagina: No lesions seen.  Dark brown speckled discharge seen.  No active bleeding seen.    Cervix: Smooth, pink, no visible lesions  Uterus: Normal size, anteverted, non-tender, mobile  Ovaries: No mass, non-tender, mobile  Perianal:  No masses noted  Extremities:  No clubbing, no cyanosis and no edema.      Assessment  Threatened       Plan  Reviewed that miscarriage occurs ~ 1 in 5 pregnancy events and that there was no direct event or prevention that the patient may avoid or perform. There are many etiologies for miscarriage, the most common being a genetic anomaly.    Reviewed options of expectant management.  Reviewed risks and benefits of all options.    She will do the ultrasound.  The lab work will be ordered, depending upon the ultrasound results.  The ultrasound has been scheduled for tomorrow.  Depending upon those results, the quants might be offered.  The Rh may be checked with the quants if these are suggested.   Questions seem to be answered.    She will likely follow up with [facility] as the couple is planning on moving there in the next few months.       Pete Cramer MD

## 2017-10-28 ENCOUNTER — MYC MEDICAL ADVICE (OUTPATIENT)
Dept: OBGYN | Facility: CLINIC | Age: 27
End: 2017-10-28

## 2017-10-28 ENCOUNTER — RADIANT APPOINTMENT (OUTPATIENT)
Dept: ULTRASOUND IMAGING | Facility: CLINIC | Age: 27
End: 2017-10-28
Attending: OBSTETRICS & GYNECOLOGY
Payer: COMMERCIAL

## 2017-10-28 DIAGNOSIS — O20.0 THREATENED ABORTION: ICD-10-CM

## 2017-10-28 PROCEDURE — 76801 OB US < 14 WKS SINGLE FETUS: CPT

## 2017-10-28 PROCEDURE — 76817 TRANSVAGINAL US OBSTETRIC: CPT

## 2017-10-30 ENCOUNTER — MYC MEDICAL ADVICE (OUTPATIENT)
Dept: OBGYN | Facility: CLINIC | Age: 27
End: 2017-10-30

## 2017-10-30 DIAGNOSIS — O20.0 THREATENED ABORTION: ICD-10-CM

## 2017-10-30 LAB
ABO + RH BLD: NORMAL
ABO + RH BLD: NORMAL
B-HCG SERPL-ACNC: 8866 IU/L (ref 0–5)
BLD GP AB SCN SERPL QL: NORMAL
BLOOD BANK CMNT PATIENT-IMP: NORMAL
SPECIMEN EXP DATE BLD: NORMAL

## 2017-10-30 PROCEDURE — 86900 BLOOD TYPING SEROLOGIC ABO: CPT | Performed by: OBSTETRICS & GYNECOLOGY

## 2017-10-30 PROCEDURE — 86901 BLOOD TYPING SEROLOGIC RH(D): CPT | Performed by: OBSTETRICS & GYNECOLOGY

## 2017-10-30 PROCEDURE — 36415 COLL VENOUS BLD VENIPUNCTURE: CPT | Performed by: OBSTETRICS & GYNECOLOGY

## 2017-10-30 PROCEDURE — 84702 CHORIONIC GONADOTROPIN TEST: CPT | Performed by: OBSTETRICS & GYNECOLOGY

## 2017-10-30 PROCEDURE — 86850 RBC ANTIBODY SCREEN: CPT | Performed by: OBSTETRICS & GYNECOLOGY

## 2017-10-30 NOTE — TELEPHONE ENCOUNTER
Noted patient had labs done at 0800 today.   Reviewed US results.  Reply sent to patient.  Will route to Dr. Cramer for review & orders. Claribel Melara RN, BAN

## 2017-10-30 NOTE — TELEPHONE ENCOUNTER
Reviewed lab as below:  Component Value Flag Ref Range Units Status Collected Lab   HCG Quantitative Serum 8866 (H) 0 - 5 IU/L Final 10/30/2017  8:11 AM MG     Repeat HCG on 11-01-17 and 11-03-17.  Patient stated perhaps if US was done today would have seen baby's heartbeat.   Not sure if sub chorionic hemorrhage will do but does not suspect it will have any affect on pregnancy. It may stay the same, increase or decrease.     Left a detailed message for patient on cell phone with Dr. Cramer's orders and plan. Explained if any other questions to call back to clinic prior to 5 pm to speak to OB/GYN RN. Verified future lab appts on 11-01-17 and 11-03-17.   Claribel Melara RN, BAN

## 2017-10-31 ENCOUNTER — MYC MEDICAL ADVICE (OUTPATIENT)
Dept: OBGYN | Facility: CLINIC | Age: 27
End: 2017-10-31

## 2017-10-31 NOTE — TELEPHONE ENCOUNTER
Reply sent to patient.  Routed to Dr. Cramer as an FYI and encounter closed. Claribel Melara RN, BAN

## 2017-11-01 DIAGNOSIS — O20.0 THREATENED ABORTION: ICD-10-CM

## 2017-11-01 LAB — B-HCG SERPL-ACNC: ABNORMAL IU/L (ref 0–5)

## 2017-11-01 PROCEDURE — 36415 COLL VENOUS BLD VENIPUNCTURE: CPT | Performed by: OBSTETRICS & GYNECOLOGY

## 2017-11-01 PROCEDURE — 84702 CHORIONIC GONADOTROPIN TEST: CPT | Performed by: OBSTETRICS & GYNECOLOGY

## 2017-11-02 ENCOUNTER — MYC MEDICAL ADVICE (OUTPATIENT)
Dept: OBGYN | Facility: CLINIC | Age: 27
End: 2017-11-02

## 2017-11-03 ENCOUNTER — TELEPHONE (OUTPATIENT)
Dept: OBGYN | Facility: CLINIC | Age: 27
End: 2017-11-03

## 2017-11-03 ENCOUNTER — MYC MEDICAL ADVICE (OUTPATIENT)
Dept: OBGYN | Facility: CLINIC | Age: 27
End: 2017-11-03

## 2017-11-03 DIAGNOSIS — O20.0 THREATENED ABORTION: ICD-10-CM

## 2017-11-03 LAB — B-HCG SERPL-ACNC: ABNORMAL IU/L (ref 0–5)

## 2017-11-03 PROCEDURE — 84702 CHORIONIC GONADOTROPIN TEST: CPT | Performed by: OBSTETRICS & GYNECOLOGY

## 2017-11-03 PROCEDURE — 36415 COLL VENOUS BLD VENIPUNCTURE: CPT | Performed by: OBSTETRICS & GYNECOLOGY

## 2017-11-03 NOTE — TELEPHONE ENCOUNTER
Dr. Cramer called me from the hospital.  The HCG level did rise but not as high as he was expecting.  He placed orders for a repeat ultrasound.  This should be done tomorrow or Monday 11/6/2017.  Notified patient.  She has the telephone number to call and schedule the ultrasound [phone].  Stefany Delarosa RN

## 2017-11-03 NOTE — TELEPHONE ENCOUNTER
Entered by Pete Cramer MD at 11/2/2017  5:36 PM   Read by Melissa Macdonald at 11/2/2017  5:56 PM   Moises Torres   The quant did not have an 66% minimum increase, as expected.  The quant should be repeated tomorrow     Noted today's HCG is still in process.   Reply sent to patient.   Will route to Dr. Cramer as an FYI and close the encounter. Claribel Melara RN, BAN

## 2017-11-03 NOTE — TELEPHONE ENCOUNTER
"Dr. Cramer called from the hospital to see if the HCG quant is complete.  It is still \"in process\".  Dr. Cramer will look for the results later today.  He requested that I send her a [email or web address] message just telling her that the results aren't in yet but will be checking later.  Message sent.  Stefany Delarosa RN    "

## 2017-11-04 ENCOUNTER — MYC MEDICAL ADVICE (OUTPATIENT)
Dept: OBGYN | Facility: CLINIC | Age: 27
End: 2017-11-04

## 2017-11-04 DIAGNOSIS — O20.0 THREATENED ABORTION: Primary | ICD-10-CM

## 2017-11-06 ENCOUNTER — RADIANT APPOINTMENT (OUTPATIENT)
Dept: ULTRASOUND IMAGING | Facility: CLINIC | Age: 27
End: 2017-11-06
Attending: OBSTETRICS & GYNECOLOGY
Payer: COMMERCIAL

## 2017-11-06 DIAGNOSIS — O20.0 THREATENED ABORTION: ICD-10-CM

## 2017-11-06 PROCEDURE — 76801 OB US < 14 WKS SINGLE FETUS: CPT

## 2017-11-06 PROCEDURE — 76817 TRANSVAGINAL US OBSTETRIC: CPT

## 2017-11-20 ENCOUNTER — RADIANT APPOINTMENT (OUTPATIENT)
Dept: ULTRASOUND IMAGING | Facility: CLINIC | Age: 27
End: 2017-11-20
Attending: OBSTETRICS & GYNECOLOGY
Payer: COMMERCIAL

## 2017-11-20 DIAGNOSIS — O20.0 THREATENED ABORTION: ICD-10-CM

## 2017-11-20 PROCEDURE — 76817 TRANSVAGINAL US OBSTETRIC: CPT

## 2017-11-20 PROCEDURE — 76801 OB US < 14 WKS SINGLE FETUS: CPT

## 2017-11-24 ENCOUNTER — TELEPHONE (OUTPATIENT)
Dept: OBGYN | Facility: CLINIC | Age: 27
End: 2017-11-24

## 2017-11-24 DIAGNOSIS — O20.0 THREATENED ABORTION: Primary | ICD-10-CM

## 2017-11-25 NOTE — TELEPHONE ENCOUNTER
"Pete Cramer MD Hatcher, Rachael, RN Hi Karen   The ultrasound does not show fetal heart motion. Please let us know if you desire to have the repeat ultrasound.   Thanks   Pete Cramer MD       I called patient.  She is not experiencing any vaginal bleeding, pain or cramping.  She is aware of the results.  She would like to have another ultrasound completed but doesn't know if she wants this ordered by Dr. Cramer to have before she meets with Dr. No in December or if she wants to wait for Dr. No and then order the ultrasound.  Patient is moving so this is the reason she has the appointment with Dr. No.  Patient will call back on Monday after she has talked about it with her spouse-she wants some time to think. Patient stated she is feeling \"tested out\".  Stefany Delarosa RN    "

## 2017-11-27 NOTE — TELEPHONE ENCOUNTER
[facility] Call Center    Phone Message    Name of Caller: Melissa    Phone Number: Home number on file [phone] (home)    Best time to return call: Any    May a detailed message be left on voicemail: yes    Relation to patient: Self    Reason for Call: Melissa called and is requesting an order be placed for a follow up ultrasound.  Requesting a call once order is placed. Please advise.  Thank you.     Action Taken: [id number]

## 2017-12-04 ENCOUNTER — RADIANT APPOINTMENT (OUTPATIENT)
Dept: ULTRASOUND IMAGING | Facility: CLINIC | Age: 27
End: 2017-12-04
Attending: OBSTETRICS & GYNECOLOGY
Payer: COMMERCIAL

## 2017-12-04 DIAGNOSIS — O20.0 THREATENED ABORTION: ICD-10-CM

## 2017-12-04 PROCEDURE — 76817 TRANSVAGINAL US OBSTETRIC: CPT

## 2017-12-04 PROCEDURE — 76801 OB US < 14 WKS SINGLE FETUS: CPT

## 2017-12-05 ENCOUNTER — NURSE TRIAGE (OUTPATIENT)
Dept: NURSING | Facility: CLINIC | Age: 27
End: 2017-12-05

## 2017-12-05 ENCOUNTER — TELEPHONE (OUTPATIENT)
Dept: OBGYN | Facility: CLINIC | Age: 27
End: 2017-12-05

## 2017-12-05 NOTE — TELEPHONE ENCOUNTER
"Patient stated that she has had period like bleeding since Bret 12/3. Recent US was abnormal as well (see report). Patient is having bright red bleeding - needing to change a regular pad 2-3 times daily. Having very mild intermittent cramping. Passing small clots \"that kind of have the color of tissue\". Rh + - no Rhogam indicated. Did discuss this is probable miscarriage. As long as she is comfortable, ascencio snot need to be seen in clinic. Did discuss taking a HPT 2 weeks after bleeding has stopped to assure it is negative (if positive, to call clinic) versus coming in for serial betas to assure they are declining. Will send a message to MD to see which route he recommends. Miscarriage precautions given and when patient should call clinic/present to ER: heavy vaginal bleeding changing a super pad an hour, severe cramping, passing large clots, lightheadedness, dizziness, SOB or chest pain/palpitations.     Please advise on FU - HPT vs betas and will call patient and place orders if needed. Thanks!  Cindy Burns    "

## 2017-12-05 NOTE — TELEPHONE ENCOUNTER
Melissa started having symptoms of a miscarriage on Sunday, 12/3/2017. She's having heavy bleeding, using about three pads/day. Very rare intermittant cramps. She's also passing clots.  She's wondering how long these symptoms will last.   Please call Melissa, [phone]  Routed: P [id number] Dr Bela BAEZ, RN [facility]

## 2017-12-05 NOTE — TELEPHONE ENCOUNTER
Melissa started having symptoms of a miscarriage on Sunday, 12/3/2017. She's having heavy bleeding, using about three pads/day. Very rare intermittant cramps. She's also passing clots.  She's wondering how long these symptoms will last.   Please call Melissa, [phone]  Routed: P [id number] Dr Bela BAEZ, RN [facility]

## 2017-12-07 NOTE — TELEPHONE ENCOUNTER
I called patient  She passed tissue and disposed of it  We discussed monitoring with home pregnancy test verses quant HCG.  She is ok with the home testing.   I suggest to wait about 3 weeks and then check.  If it is positive, then repeat in about 2 weeks.  If it is positive at that time, then consider quants.   She is informed that sometimes, it takes a few months for the levels to return to negative.   I would suggest to wait a couple of months before attempting.    Questions seem to be answered.   Pete Cramer MD

## 2018-01-29 ENCOUNTER — MYC MEDICAL ADVICE (OUTPATIENT)
Dept: OBGYN | Facility: CLINIC | Age: 28
End: 2018-01-29

## 2018-01-29 ENCOUNTER — NURSE TRIAGE (OUTPATIENT)
Dept: NURSING | Facility: CLINIC | Age: 28
End: 2018-01-29

## 2018-01-29 ENCOUNTER — TRANSFERRED RECORDS (OUTPATIENT)
Dept: HEALTH INFORMATION MANAGEMENT | Facility: CLINIC | Age: 28
End: 2018-01-29

## 2018-01-30 NOTE — TELEPHONE ENCOUNTER
"  Reason for Disposition    Passed tissue (e.g., gray-white)     \"I had a miscarriage in December see epic, and I was bleeding quite a bit and had thought I passed the fetus then. But for the past couple of days I had some bleeding, I thought I was getting back to my normal period. I also had some cramping which I never do. Tonight after work I just passed some tissue. It's about 2 inches long, there are two little red spots on one end and then it sort of goes in to a cone or tail on the other end. I am wondering if this is the fetus , it's been over a month now.\" Denies other sx. Advised ER.    Protocols used:  - THREATENED MISCARRIAGE FOLLOW-UP CALL-ADULT-    "

## 2018-01-30 NOTE — TELEPHONE ENCOUNTER
I called patient.   She went to [facility] yesterday.  She reports her quant to be 11.  She took in the tissue she passed and we should be able to see the pathology results at her visit in 2 days.  Precautions reviewed.   Questions seem to be answered.   Pete Cramer MD

## 2018-01-30 NOTE — TELEPHONE ENCOUNTER
Last OV with Dr. Cramer on 10-27-17. Last HCG was 15,150 on 11-03-18.   Noted patient sent 2 [employer] messages.   Reply sent to patient.  Patient has appt with Dr. Cramer on 02-01-18 at   Will route to Dr. Cramer as an FYI. Claribel Melara RN, BAN

## 2018-02-01 ENCOUNTER — OFFICE VISIT (OUTPATIENT)
Dept: OBGYN | Facility: CLINIC | Age: 28
End: 2018-02-01
Payer: COMMERCIAL

## 2018-02-01 VITALS
DIASTOLIC BLOOD PRESSURE: 70 MMHG | HEART RATE: 66 BPM | OXYGEN SATURATION: 99 % | SYSTOLIC BLOOD PRESSURE: 109 MMHG | BODY MASS INDEX: 30.11 KG/M2 | WEIGHT: 189.4 LBS

## 2018-02-01 DIAGNOSIS — O03.9 COMPLETE SPONTANEOUS ABORTION: Primary | ICD-10-CM

## 2018-02-01 PROCEDURE — 99213 OFFICE O/P EST LOW 20 MIN: CPT | Performed by: OBSTETRICS & GYNECOLOGY

## 2018-02-01 NOTE — MR AVS SNAPSHOT
After Visit Summary   2018    Melissa Macdonald    MRN: [id number]           Patient Information     Date Of Birth          1990        Visit Information        Provider Department      2018 8:45 AM Pete Cramer MD [facility]        Today's Diagnoses     Complete spontaneous     -  1       Follow-ups after your visit        Follow-up notes from your care team     Return in about 1 year (around 2019) for Physical Exam.      Who to contact     If you have questions or need follow up information about today's clinic visit or your schedule please contact [facility] directly at [phone].  Normal or non-critical lab and imaging results will be communicated to you by MyChart, letter or phone within 4 business days after the clinic has received the results. If you do not hear from us within 7 days, please contact the clinic through [employer] or phone. If you have a critical or abnormal lab result, we will notify you by phone as soon as possible.  Submit refill requests through [employer] or call your pharmacy and they will forward the refill request to us. Please allow 3 business days for your refill to be completed.          Additional Information About Your Visit        MyChart Information     [employer] gives you secure access to your electronic health record. If you see a primary care provider, you can also send messages to your care team and make appointments. If you have questions, please call your primary care clinic.  If you do not have a primary care provider, please call [phone] and they will assist you.        Care EveryWhere ID     This is your Care EveryWhere ID. This could be used by other organizations to access your [place] medical records  [id number]        Your Vitals Were     Pulse Last Period Pulse Oximetry Breastfeeding? BMI (Body Mass Index)       66 2018 (Approximate) 99% No 30.11 kg/m2        Blood Pressure  from Last 3 Encounters:   02/01/18 109/70   10/27/17 119/69   10/20/17 115/77    Weight from Last 3 Encounters:   02/01/18 189 lb 6.4 oz (85.9 kg)   10/27/17 190 lb 3.2 oz (86.3 kg)   10/20/17 187 lb (84.8 kg)              Today, you had the following     No orders found for display       Primary Care Provider Office Phone # Fax #    Kristina Adriane Mccoy [phone] [address] [address]        Equal Access to Services     [facility]: Hadii sage stein hadasho Socharleen, waaxda luqadaha, qaybta kaalmada briannayafranny, elodia reed . So [facility] [phone].    ATENCIÓN: Si habla español, tiene a alberto disposición servicios gratuitos de asistencia lingüística. Ll[facility] [phone].    We comply with applicable federal civil rights laws and [place] laws. We do not discriminate on the basis of race, color, national origin, age, disability, sex, sexual orientation, or gender identity.            Thank you!     Thank you for choosing [facility]  for your care. Our goal is always to provide you with excellent care. Hearing back from our patients is one way we can continue to improve our services. Please take a few minutes to complete the written survey that you may receive in the mail after your visit with us. Thank you!             Your Updated Medication List - Protect others around you: Learn how to safely use, store and throw away your medicines at www.disposemymeds.org.          This list is accurate as of 2/1/18 10:49 AM.  Always use your most recent med list.                   Brand Name Dispense Instructions for use Diagnosis    VITAMIN D (CHOLECALCIFEROL) PO      Take 2,000 Units by mouth 2 times daily

## 2018-02-01 NOTE — PROGRESS NOTES
Melissa Macdonald is a 27 year old female, , presents today for follow up from [facility].  She passed tissue that had a couple of dark spots like eyes and seemed like it had extremities.  She has not had any additional cramping.  She has had weakly positive home pregnancy tests.   She has not had much spotting or bleeding since.    She had the quant and the pathology results, and the ultrasound performed at [facility].  The following results are reviewed with her.       Quantitative Beta HCG Pregnancy Test (2018 7:34 PM)  Quantitative Beta HCG Pregnancy Test (2018 7:34 PM)   Component Value Ref Range   HCG BETA QUANT,PREGNANCY 11          Pathology Report                                  Case: [id number]                                  Authorizing Provider:  Franco Lovett, Collected:           2018                                     MD                                                                           Ordering Location:     [facility]             Received:            2018 0740              Pathologist:           Demetrius Maciel MD                                                      Specimen:    Vagina                                                                                  Final Diagnosis    A) UTERINE CONTENTS, PASSED TISSUE:  1. Degenerating decidua and chorionic villi consistent with intrauterine products      of conception  2. Negative for somatic fetal tissue, grossly and microscopically  3. Negative for abnormal trophoblastic proliferation and malignancy       US OB 1ST TRI SINGLE TA (2018 9:25 PM)  US OB 1ST TRI SINGLE TA (2018 9:25 PM)   Narrative   Clinical History: Miscarriage.        Technique: Transabdominal ultrasound is performed.        Comparison: None.        Findings: No intrauterine or extrauterine gestation is seen. Uterus is anteverted measuring 8.7 x 3.1 x 5.2 cm. Endometrial stripe is trilaminar measuring up to 7  mm. No abnormal endometrial thickening. No fluid or debris along the endometrial canal.        The right ovary is 2.7 x 1.2 x 2.6 cm. The left ovary is 2.4 x 1.5 x 2.4 cm. No free fluid is present in the cul-de-sac.        Impression:    1. No sonographic evidence of retained products of conception.    2. No intrauterine or extrauterine gestation.    3. Normal sonographic appearance of the ovaries       Past Medical History:   Diagnosis Date     NO ACTIVE PROBLEMS      Past Surgical History:   Procedure Laterality Date     HC TOOTH EXTRACTION W/FORCEP        No Known Allergies    Current Outpatient Prescriptions   Medication Sig Dispense Refill     VITAMIN D, CHOLECALCIFEROL, PO Take 2,000 Units by mouth 2 times daily         Social History     Social History     Marital status:      Spouse name: N/A     Number of children: 0     Years of education: 18     Occupational History     Not on file.     Social History Main Topics     Smoking status: Never Smoker     Smokeless tobacco: Never Used     Alcohol use Yes      Comment: 4-5 drinks a week      Drug use: No     Sexual activity: Yes     Partners: Male     Birth control/ protection: Pill     Other Topics Concern     Not on file     Social History Narrative     Family History   Problem Relation Age of Onset     Breast Cancer Mother 60     Obesity Mother      Obesity Father      Depression Father      Hypertension Father      Hyperlipidemia Father      Dementia Maternal Grandmother      Arthritis Paternal Grandmother      Coronary Artery Disease Paternal Grandfather      Unknown/Adopted Brother        Review of Systems:  10 point ROS of systems including Constitutional, Eyes, Respiratory, Cardiovascular, Gastroenterology, Genitourinary, Integumentary, Muscularskeletal, Psychiatric were all negative except for pertinent positives noted in my HPI and in the PMH.      Exam  /70 (BP Location: Right arm, Cuff Size: Adult Large)  Pulse 66  Wt 189 lb 6.4 oz  (85.9 kg)  LMP 01/27/2018 (Approximate)  SpO2 99%  Breastfeeding? No  BMI 30.11 kg/m2  General:  WNWD female, NAD  Alert  Oriented x 3  Gait:  Normal  Skin:  Normal skin turgor  HEENT:  NC/AT, EOMI  Abdomen:  Non-tender, non-distended.  Pelvic exam:  Not performed  Extremities:  No clubbing, no cyanosis and no edema.      Assessment  Complete ab      Plan  Recommend to wait an additional couple of months before attempting.  She expresses that this is about the amount of time they were going to wait, anyway.   Continue with OTC PNV, for the folic acid.   Pap smear management was reviewed and annual exam in about a year.   Questions seemed to be answered.    Pete Cramer MD

## 2018-02-01 NOTE — NURSING NOTE
"Chief Complaint   Patient presents with     Hospital F/U     Seen at [facility] 1/29/18 passed tissue had had a miscarriage in December.       Initial /70 (BP Location: Right arm, Cuff Size: Adult Large)  Pulse 66  Wt 189 lb 6.4 oz (85.9 kg)  LMP 01/27/2018 (Approximate)  SpO2 99%  Breastfeeding? No  BMI 30.11 kg/m2 Estimated body mass index is 30.11 kg/(m^2) as calculated from the following:    Height as of 9/21/17: 5' 6.5\" (1.689 m).    Weight as of this encounter: 189 lb 6.4 oz (85.9 kg).  Medication Reconciliation: complete   WALLY Erickson 2/1/2018         "

## 2018-02-12 ENCOUNTER — MYC MEDICAL ADVICE (OUTPATIENT)
Dept: OBGYN | Facility: CLINIC | Age: 28
End: 2018-02-12

## 2018-02-23 ENCOUNTER — OFFICE VISIT (OUTPATIENT)
Dept: OBGYN | Facility: CLINIC | Age: 28
End: 2018-02-23
Payer: COMMERCIAL

## 2018-02-23 VITALS
BODY MASS INDEX: 30.21 KG/M2 | HEART RATE: 76 BPM | OXYGEN SATURATION: 99 % | DIASTOLIC BLOOD PRESSURE: 79 MMHG | WEIGHT: 190 LBS | SYSTOLIC BLOOD PRESSURE: 117 MMHG

## 2018-02-23 DIAGNOSIS — Z31.69 PRE-CONCEPTION COUNSELING: Primary | ICD-10-CM

## 2018-02-23 PROCEDURE — 99214 OFFICE O/P EST MOD 30 MIN: CPT | Performed by: OBSTETRICS & GYNECOLOGY

## 2018-02-23 RX ORDER — PRENATAL VIT/IRON FUM/FOLIC AC 27MG-0.8MG
1 TABLET ORAL DAILY
COMMUNITY
End: 2019-08-07

## 2018-02-23 NOTE — MR AVS SNAPSHOT
After Visit Summary   2/23/2018    Melissa Macdonald    MRN: [id number]           Patient Information     Date Of Birth          1990        Visit Information        Provider Department      2/23/2018 9:00 AM Pete Cramer MD [facility]        Today's Diagnoses     Pre-conception counseling    -  1       Follow-ups after your visit        Follow-up notes from your care team     Return if symptoms worsen or fail to improve.      Who to contact     If you have questions or need follow up information about today's clinic visit or your schedule please contact [facility] directly at [phone].  Normal or non-critical lab and imaging results will be communicated to you by ShareMagnethart, letter or phone within 4 business days after the clinic has received the results. If you do not hear from us within 7 days, please contact the clinic through ShareMagnethart or phone. If you have a critical or abnormal lab result, we will notify you by phone as soon as possible.  Submit refill requests through [email or web address] or call your pharmacy and they will forward the refill request to us. Please allow 3 business days for your refill to be completed.          Additional Information About Your Visit        MyChart Information     [email or web address] gives you secure access to your electronic health record. If you see a primary care provider, you can also send messages to your care team and make appointments. If you have questions, please call your primary care clinic.  If you do not have a primary care provider, please call [phone] and they will assist you.        Care EveryWhere ID     This is your Care EveryWhere ID. This could be used by other organizations to access your [place] medical records  [id number]        Your Vitals Were     Pulse Last Period Pulse Oximetry Breastfeeding? BMI (Body Mass Index)       76 02/23/2018 99% No 30.21 kg/m2        Blood Pressure from Last 3 Encounters:   02/23/18  117/79   02/01/18 109/70   10/27/17 119/69    Weight from Last 3 Encounters:   02/23/18 190 lb (86.2 kg)   02/01/18 189 lb 6.4 oz (85.9 kg)   10/27/17 190 lb 3.2 oz (86.3 kg)              Today, you had the following     No orders found for display       Primary Care Provider Office Phone # Fax #    Kristina Adriane Mccoy [phone] [address] [facility] [address]        Equal Access to Services     [facility]: Hadii aad ku hadasho Soomaali, waaxda luqadaha, qaybta kaalmada adeegyafarnny, elodia reed . So [facility] [phone].    ATENCIÓN: Si habla español, tiene a alberto disposición servicios gratuitos de asistencia lingüística. [facility] [phone].    We comply with applicable federal civil rights laws and [place] laws. We do not discriminate on the basis of race, color, national origin, age, disability, sex, sexual orientation, or gender identity.            Thank you!     Thank you for choosing [facility]  for your care. Our goal is always to provide you with excellent care. Hearing back from our patients is one way we can continue to improve our services. Please take a few minutes to complete the written survey that you may receive in the mail after your visit with us. Thank you!             Your Updated Medication List - Protect others around you: Learn how to safely use, store and throw away your medicines at www.disposemymeds.org.          This list is accurate as of 2/23/18 11:59 PM.  Always use your most recent med list.                   Brand Name Dispense Instructions for use Diagnosis    prenatal multivitamin plus iron 27-0.8 MG Tabs per tablet      Take 1 tablet by mouth daily        VITAMIN D (CHOLECALCIFEROL) PO      Take 2,000 Units by mouth 2 times daily

## 2018-02-23 NOTE — NURSING NOTE
"Chief Complaint   Patient presents with     RECHECK     follow up from miscarriage questions on future pregnancies       Initial /79 (BP Location: Right arm, Cuff Size: Adult Regular)  Pulse 76  Wt 190 lb (86.2 kg)  LMP 02/23/2018  SpO2 99%  Breastfeeding? No  BMI 30.21 kg/m2 Estimated body mass index is 30.21 kg/(m^2) as calculated from the following:    Height as of 9/21/17: 5' 6.5\" (1.689 m).    Weight as of this encounter: 190 lb (86.2 kg).  Medication Reconciliation: complete   WALLY Erickson 2/23/2018         "

## 2018-03-26 ENCOUNTER — MYC MEDICAL ADVICE (OUTPATIENT)
Dept: OBGYN | Facility: CLINIC | Age: 28
End: 2018-03-26

## 2018-04-04 NOTE — PROGRESS NOTES
Melissa Macdonald is a 27 year old female, , who presents today to discuss possible future pregnancies.  She underwent a complete miscarriage in .  She did not have instrumentation regarding the miscarriage.  She has some additional questions about future pregnancies as she and her  desire to attempt again.    We reviewed that miscarriage occurs ~ 1 in 5 pregnancy events and that there was no direct event or prevention  that the patient could have avoided or performed.  There are many etiologies for miscarriage, the most common being a genetic anomaly.  Reviewed that risk of miscarriage for next pregnancy is not elevated by the current event.  We reviewed her immunization status and she appears to be up to date.    We also reviewed medications and the use of folic acid/PNV to help decrease risks for NTD.    We also reviewed the hospital setting for delivery compared to a home delivery or an outpatient birth center.  Anesthetic options discussed, possible  sections reviewed and transfer to another facility might be needed.  The goals and limitations of each reviewed.      Past Medical History:   Diagnosis Date     NO ACTIVE PROBLEMS        Past Surgical History:   Procedure Laterality Date     HC TOOTH EXTRACTION W/FORCEP          No Known Allergies    Current Outpatient Prescriptions   Medication Sig Dispense Refill     Prenatal Vit-Fe Fumarate-FA (PRENATAL MULTIVITAMIN PLUS IRON) 27-0.8 MG TABS per tablet Take 1 tablet by mouth daily       VITAMIN D, CHOLECALCIFEROL, PO Take 2,000 Units by mouth 2 times daily         Social History     Social History     Marital status:      Spouse name: N/A     Number of children: 0     Years of education: 18     Occupational History     Not on file.     Social History Main Topics     Smoking status: Never Smoker     Smokeless tobacco: Never Used     Alcohol use Yes      Comment: 4-5 drinks a week      Drug use: No     Sexual activity: Yes      Partners: Male     Birth control/ protection: Pill     Other Topics Concern     Not on file     Social History Narrative       Family History   Problem Relation Age of Onset     Breast Cancer Mother 60     Obesity Mother      Obesity Father      Depression Father      Hypertension Father      Hyperlipidemia Father      Dementia Maternal Grandmother      Arthritis Paternal Grandmother      Coronary Artery Disease Paternal Grandfather      Unknown/Adopted Brother        Review of Systems:  10 point ROS of systems including Constitutional, Eyes, Respiratory, Cardiovascular, Gastroenterology, Genitourinary, Integumentary, Muscularskeletal, Psychiatric were all negative except for pertinent positives noted in my HPI and in the PMH.      Exam  /79 (BP Location: Right arm, Cuff Size: Adult Regular)  Pulse 76  Wt 190 lb (86.2 kg)  LMP 02/23/2018  SpO2 99%  Breastfeeding? No  BMI 30.21 kg/m2  General:  WNWD female, NAD  Alert  Oriented x 3  Gait:  Normal  Skin:  Normal skin turgor  HEENT:  NC/AT, EOMI  Abdomen:  Non-tender, non-distended.  Pelvic exam:  Not performed  Extremities:  No clubbing, no cyanosis and no edema.      Assessment  Pre-pregnancy counseling      Plan  The patient and I discussed the information as noted above in the HPI.  Questions seem to be answered.     TT 30 min  CT greater than 70%, as noted above in the HPI.     Pete Cramer MD

## 2018-05-03 ENCOUNTER — TELEPHONE (OUTPATIENT)
Dept: OBGYN | Facility: CLINIC | Age: 28
End: 2018-05-03

## 2018-05-03 NOTE — TELEPHONE ENCOUNTER
Reason for call:  Patient reporting a symptom    Symptom or request: Nausea    Duration (how long have symptoms been present): a few weeks    Have you been treated for this before? No    Additional comments: Patient is 6 1/2 weeks pregnant and states nausea is pretty bad today. She is taking B6 but would like to know if she can also take Doxylamine-Succinate? Please call.    Phone Number patient can be reached at:  Home number on file [phone] (home)    Best Time:  any    Can we leave a detailed message on this number:  YES    Call taken on 5/3/2018 at 8:19 AM by Suzanna Bassett

## 2018-05-03 NOTE — TELEPHONE ENCOUNTER
Left message asking pt to call back to clinic. Left clinic call back phone number and RN hours. Claribel Melara RN, DANIELLE

## 2018-05-03 NOTE — TELEPHONE ENCOUNTER
"Return call from patient. Patient reported LMP 3-20-18 6w 2d. She stated her  is a pharmacist. She is taking Vit B-6 and nausea is \"not too bad\" and really no nausea today. Patient stated she has been taking Vitamin B-6 25 mg 1/4 tab prn. Patient stated she has doxylamine 25 mg tabs. Gave her advise on taking OTC meds. Patient is not taking PNV at this time and will discuss at her confirmation of pregnancy appt on 05-08-18. Claribel Melara RN, BAN      "

## 2018-05-08 ENCOUNTER — OFFICE VISIT (OUTPATIENT)
Dept: OBGYN | Facility: CLINIC | Age: 28
End: 2018-05-08
Payer: COMMERCIAL

## 2018-05-08 VITALS
DIASTOLIC BLOOD PRESSURE: 72 MMHG | HEART RATE: 60 BPM | OXYGEN SATURATION: 100 % | BODY MASS INDEX: 29.89 KG/M2 | SYSTOLIC BLOOD PRESSURE: 116 MMHG | WEIGHT: 188 LBS

## 2018-05-08 DIAGNOSIS — N91.2 ABSENCE OF MENSTRUATION: Primary | ICD-10-CM

## 2018-05-08 DIAGNOSIS — Z32.01 PREGNANCY TEST POSITIVE: ICD-10-CM

## 2018-05-08 DIAGNOSIS — O21.9 NAUSEA AND VOMITING OF PREGNANCY, ANTEPARTUM: ICD-10-CM

## 2018-05-08 DIAGNOSIS — Z87.59 HISTORY OF MISCARRIAGE: ICD-10-CM

## 2018-05-08 LAB — BETA HCG QUAL IFA URINE: POSITIVE

## 2018-05-08 PROCEDURE — 99213 OFFICE O/P EST LOW 20 MIN: CPT | Performed by: OBSTETRICS & GYNECOLOGY

## 2018-05-08 PROCEDURE — 84703 CHORIONIC GONADOTROPIN ASSAY: CPT | Performed by: OBSTETRICS & GYNECOLOGY

## 2018-05-08 NOTE — MR AVS SNAPSHOT
After Visit Summary   5/8/2018    Melissa Macdonald    MRN: [id number]           Patient Information     Date Of Birth          1990        Visit Information        Provider Department      5/8/2018 9:30 AM Pete Cramer MD [facility]        Today's Diagnoses     Absence of menstruation    -  1    Pregnancy test positive        Nausea and vomiting of pregnancy, antepartum        History of miscarriage          Care Instructions      First trimester topics:  Clinic procedures / Outline prenatal care    HIV Counseling / Testing (see the FV book, page: 26)  Nutrition (see the FV book, page: 7)  Breast Feeding/Breast Changes   Vitamins / Minerals (see the FV book, page: 7)  Weight gain (see the FV book, page: 8)  Seat belts  Exercise (see the FV book, pages: (9-11)  Hazards: (FV book pages 6-7)   Discomfort / Relief measures (see the FV book, pages: 22-24)  Warning signs: Bleeding, Cramps, Abdominal pains, Dysuria (see the FV book, page: 25)                Follow-ups after your visit        Follow-up notes from your care team     Return in about 3 weeks (around 5/29/2018).      Who to contact     If you have questions or need follow up information about today's clinic visit or your schedule please contact [facility] directly at [phone].  Normal or non-critical lab and imaging results will be communicated to you by MyChart, letter or phone within 4 business days after the clinic has received the results. If you do not hear from us within 7 days, please contact the clinic through Smarp Oyhart or phone. If you have a critical or abnormal lab result, we will notify you by phone as soon as possible.  Submit refill requests through [employer] or call your pharmacy and they will forward the refill request to us. Please allow 3 business days for your refill to be completed.          Additional Information About Your Visit        Smarp Oyhart Information     [employer] gives you  secure access to your electronic health record. If you see a primary care provider, you can also send messages to your care team and make appointments. If you have questions, please call your primary care clinic.  If you do not have a primary care provider, please call [phone] and they will assist you.        Care EveryWhere ID     This is your Care EveryWhere ID. This could be used by other organizations to access your [place] medical records  [id number]        Your Vitals Were     Pulse Last Period Pulse Oximetry BMI (Body Mass Index)          60 03/20/2018 (Exact Date) 100% 29.89 kg/m2         Blood Pressure from Last 3 Encounters:   06/13/18 110/70   05/29/18 122/72   05/08/18 116/72    Weight from Last 3 Encounters:   06/13/18 190 lb (86.2 kg)   05/29/18 188 lb (85.3 kg)   05/08/18 188 lb (85.3 kg)              We Performed the Following     Beta HCG qual IFA urine        Primary Care Provider Office Phone # Fax #    Kristina Adriane Mccoy [phone] [address] [facility] [address]        Equal Access to Services     [facility]: Hadii aad ku hadasho Soomaali, waaxda luqadaha, qaybta kaalmada adesunithayada, elodia reed . So [facility] [phone].    ATENCIÓN: Si habla español, tiene a alberto disposición servicios gratuitos de asistencia lingüística. [facility] [phone].    We comply with applicable federal civil rights laws and [place] laws. We do not discriminate on the basis of race, color, national origin, age, disability, sex, sexual orientation, or gender identity.            Thank you!     Thank you for choosing [facility]  for your care. Our goal is always to provide you with excellent care. Hearing back from our patients is one way we can continue to improve our services. Please take a few minutes to complete the written survey that you may receive in the mail after your visit with us. Thank you!             Your Updated  Medication List - Protect others around you: Learn how to safely use, store and throw away your medicines at www.disposemymeds.org.          This list is accurate as of 5/8/18 11:59 PM.  Always use your most recent med list.                   Brand Name Dispense Instructions for use Diagnosis    doxylamine 25 MG Tabs tablet    UNISOM     Take 25 mg by mouth At Bedtime        FOLIC ACID PO      Take 1 mg by mouth daily        prenatal multivitamin plus iron 27-0.8 MG Tabs per tablet      Take 1 tablet by mouth daily        VITAMIN B6 PO           VITAMIN D (CHOLECALCIFEROL) PO      Take 2,000 Units by mouth 2 times daily

## 2018-05-08 NOTE — PATIENT INSTRUCTIONS
First trimester topics:  Clinic procedures / Outline prenatal care    HIV Counseling / Testing (see the FV book, page: 26)  Nutrition (see the FV book, page: 7)  Breast Feeding/Breast Changes   Vitamins / Minerals (see the FV book, page: 7)  Weight gain (see the FV book, page: 8)  Seat belts  Exercise (see the FV book, pages: (9-11)  Hazards: (FV book pages 6-7)   Discomfort / Relief measures (see the FV book, pages: 22-24)  Warning signs: Bleeding, Cramps, Abdominal pains, Dysuria (see the FV book, page: 25)

## 2018-05-08 NOTE — PROGRESS NOTES
Melissa Macdonald is a 27 year old,  who presents today with absence of menses. LMP was 2018.  She has had home pregnancy test that was positive.  She has had nausea since week 6 of the pregnancy.  She has not needed medications yet for it.   She and her  bought a house in [place] so they will be seeing an MD in the [facility].      Obstetric History       T0      L0     SAB1   TAB0   Ectopic0   Multiple0   Live Births0       # Outcome Date GA Lbr Abhishek/2nd Weight Sex Delivery Anes PTL Lv   3 Current            2 2017           1 AB                   Gynecological History         LMP:  2018      no STD/ no PID/ no IUD use  no abnormal pap smears       Past Medical History:   Diagnosis Date     History of miscarriage 2017       Past Surgical History:   Procedure Laterality Date     HC TOOTH EXTRACTION W/FORCEP         No Known Allergies    Current Outpatient Prescriptions   Medication Sig Dispense Refill     doxylamine (UNISOM) 25 MG TABS tablet Take 25 mg by mouth At Bedtime       FOLIC ACID PO Take 1 mg by mouth daily       Pyridoxine HCl (VITAMIN B6 PO)        Prenatal Vit-Fe Fumarate-FA (PRENATAL MULTIVITAMIN PLUS IRON) 27-0.8 MG TABS per tablet Take 1 tablet by mouth daily       VITAMIN D, CHOLECALCIFEROL, PO Take 2,000 Units by mouth 2 times daily         Social History   Substance Use Topics     Smoking status: Never Smoker     Smokeless tobacco: Never Used     Alcohol use Yes      Comment: 4-5 drinks a week        Family History   Problem Relation Age of Onset     Breast Cancer Mother 60     Obesity Mother      Obesity Father      Depression Father      Hypertension Father      Hyperlipidemia Father      Dementia Maternal Grandmother      Arthritis Paternal Grandmother      Coronary Artery Disease Paternal Grandfather      Unknown/Adopted Brother          ROS:    4 point ROS including Respiratory, CV, GI and , other than that noted in the HPI and the PMH,  is  negative     EXAM:  /72 (BP Location: Right arm, Cuff Size: Adult Large)  Pulse 60  Wt 188 lb (85.3 kg)  LMP 03/20/2018 (Exact Date)  SpO2 100%  BMI 29.89 kg/m2  General:  WNWD female, NAD  Alert  Oriented x 3  Gait:  Normal  Skin:  Normal skin turgor  HEENT:  NC/AT, EOMI  Abdomen:  Non-tender, non-distended.  Pelvic exam:  Not performed  Extremities:  No clubbing, no cyanosis and no edema.      A: Missed Menses  Weeks gestation: 7 weeks  ADALID: 12/25/2018  Pregnancy test positive  Nausea of pregnancy  History of miscarriage      P: 1) Prenatal vitamins may be used, but may also be held until after 12 weeks gestation due to the nausea that might be associated with it.   She may use Unisom tablets and Vit B6 tablets for the nausea.   2) Diet, exercise, work, and environmental hazards reviewed. Toxoplasmosis precautions reviewed. Discussed avoidance of tobacco, ETOH, and street drugs. Signs and symptoms to monitor for and report discussed. Will schedule first OB visit at 10-12 weeks gestation.   Approximately 20 minutes face to face time was spent with the patient today entirely in education and counseling regarding first trimester anticipatory guidance, prenatal visit schedule, delivery hospital.    Pete Cramer MD

## 2018-05-28 PROBLEM — Z87.59 HISTORY OF MISCARRIAGE: Status: ACTIVE | Noted: 2017-12-01

## 2018-05-29 ENCOUNTER — PRENATAL OFFICE VISIT (OUTPATIENT)
Dept: OBGYN | Facility: CLINIC | Age: 28
End: 2018-05-29
Payer: COMMERCIAL

## 2018-05-29 ENCOUNTER — RADIANT APPOINTMENT (OUTPATIENT)
Dept: ULTRASOUND IMAGING | Facility: CLINIC | Age: 28
End: 2018-05-29
Payer: COMMERCIAL

## 2018-05-29 VITALS
BODY MASS INDEX: 29.51 KG/M2 | WEIGHT: 188 LBS | HEIGHT: 67 IN | SYSTOLIC BLOOD PRESSURE: 122 MMHG | DIASTOLIC BLOOD PRESSURE: 72 MMHG

## 2018-05-29 DIAGNOSIS — O36.70X0 MATERNAL CARE FOR VIABLE FETUS IN ABDOMINAL PREGNANCY: ICD-10-CM

## 2018-05-29 DIAGNOSIS — O02.1 MISSED ABORTION: Primary | ICD-10-CM

## 2018-05-29 PROCEDURE — 84702 CHORIONIC GONADOTROPIN TEST: CPT | Performed by: OBSTETRICS & GYNECOLOGY

## 2018-05-29 PROCEDURE — 99214 OFFICE O/P EST MOD 30 MIN: CPT | Performed by: OBSTETRICS & GYNECOLOGY

## 2018-05-29 PROCEDURE — 36415 COLL VENOUS BLD VENIPUNCTURE: CPT | Performed by: OBSTETRICS & GYNECOLOGY

## 2018-05-29 PROCEDURE — 76817 TRANSVAGINAL US OBSTETRIC: CPT | Performed by: OBSTETRICS & GYNECOLOGY

## 2018-05-29 ASSESSMENT — ANXIETY QUESTIONNAIRES
2. NOT BEING ABLE TO STOP OR CONTROL WORRYING: NOT AT ALL
3. WORRYING TOO MUCH ABOUT DIFFERENT THINGS: SEVERAL DAYS
1. FEELING NERVOUS, ANXIOUS, OR ON EDGE: SEVERAL DAYS
6. BECOMING EASILY ANNOYED OR IRRITABLE: SEVERAL DAYS
GAD7 TOTAL SCORE: 4
7. FEELING AFRAID AS IF SOMETHING AWFUL MIGHT HAPPEN: SEVERAL DAYS
IF YOU CHECKED OFF ANY PROBLEMS ON THIS QUESTIONNAIRE, HOW DIFFICULT HAVE THESE PROBLEMS MADE IT FOR YOU TO DO YOUR WORK, TAKE CARE OF THINGS AT HOME, OR GET ALONG WITH OTHER PEOPLE: SOMEWHAT DIFFICULT
5. BEING SO RESTLESS THAT IT IS HARD TO SIT STILL: NOT AT ALL

## 2018-05-29 ASSESSMENT — PATIENT HEALTH QUESTIONNAIRE - PHQ9: 5. POOR APPETITE OR OVEREATING: NOT AT ALL

## 2018-05-29 NOTE — MR AVS SNAPSHOT
After Visit Summary   2018    Melissa Macdonald    MRN: [id number]           Patient Information     Date Of Birth          1990        Visit Information        Provider Department      2018 1:40 PM Eboni Garcia MD; WE TRIAGE [facility]        Today's Diagnoses     Missed     -  1      Care Instructions    Long discussion today regarding early miscarriage.  Discussed causes and reassurance given that this is nothing that she could have changed, prevented, etc.  Discussed possible management options at this time including expectant management, medical management with cytotec or surgical management with D&C.  Would like to monitor for now and may consider surgical options in 2wks if no results.  Discussed r/b/a of each.  Will check pregnancy hormone level today.  Blood type A+  Will keep us updated          Follow-ups after your visit        Follow-up notes from your care team     Return if symptoms worsen or fail to improve.      Who to contact     If you have questions or need follow up information about today's clinic visit or your schedule please contact [facility] directly at [phone].  Normal or non-critical lab and imaging results will be communicated to you by [employer], letter or phone within 4 business days after the clinic has received the results. If you do not hear from us within 7 days, please contact the clinic through [employer] or phone. If you have a critical or abnormal lab result, we will notify you by phone as soon as possible.  Submit refill requests through [employer] or call your pharmacy and they will forward the refill request to us. Please allow 3 business days for your refill to be completed.          Additional Information About Your Visit        MyChart Information     [employer] gives you secure access to your electronic health record. If you see a primary care provider, you can also send messages to your care  "team and make appointments. If you have questions, please call your primary care clinic.  If you do not have a primary care provider, please call [phone] and they will assist you.        Care EveryWhere ID     This is your Care EveryWhere ID. This could be used by other organizations to access your [place] medical records  [id number]        Your Vitals Were     Height Last Period Breastfeeding? BMI (Body Mass Index)          5' 6.5\" (1.689 m) 03/20/2018 (Exact Date) No 29.89 kg/m2         Blood Pressure from Last 3 Encounters:   05/29/18 122/72   05/08/18 116/72   02/23/18 117/79    Weight from Last 3 Encounters:   05/29/18 188 lb (85.3 kg)   05/08/18 188 lb (85.3 kg)   02/23/18 190 lb (86.2 kg)              We Performed the Following     HCG quantitative pregnancy        Primary Care Provider Office Phone # Fax #    Kristina Mccoy [phone] [address] [facility] [address]        Equal Access to Services     [facility] HospitalLATRICIA : Hadii aad ku hadasho Soomaali, waaxda luqadaha, qaybta kaalmada adeegyafranny, elodia reed . So [facility] [phone].    ATENCIÓN: Si habla español, tiene a alberto disposición servicios gratuitos de asistencia lingüística. [facility] [phone].    We comply with applicable federal civil rights laws and [place] laws. We do not discriminate on the basis of race, color, national origin, age, disability, sex, sexual orientation, or gender identity.            Thank you!     Thank you for choosing [facility]  for your care. Our goal is always to provide you with excellent care. Hearing back from our patients is one way we can continue to improve our services. Please take a few minutes to complete the written survey that you may receive in the mail after your visit with us. Thank you!             Your Updated Medication List - Protect others around you: Learn how to safely use, store and throw away your medicines at " www.disposemymeds.org.          This list is accurate as of 5/29/18  4:58 PM.  Always use your most recent med list.                   Brand Name Dispense Instructions for use Diagnosis    doxylamine 25 MG Tabs tablet    UNISOM     Take 25 mg by mouth At Bedtime        FOLIC ACID PO      Take 1 mg by mouth daily        prenatal multivitamin plus iron 27-0.8 MG Tabs per tablet      Take 1 tablet by mouth daily        VITAMIN B6 PO           VITAMIN D (CHOLECALCIFEROL) PO      Take 2,000 Units by mouth 2 times daily

## 2018-05-29 NOTE — PROGRESS NOTES
SUBJECTIVE:                                                   Melissa Macdonald is a 27 year old female who presents to clinic today for the following health issue(s):  Patient presents with:  Ultrasound    HPI:  New patient --here today for new OB visit and found to have absent heart beat.  Sure LMP --should be 10+1wks; gestational sac measuring 7+5wks and possible CRL at only 6+1wks.  No vb/spotting/cramping.  Had some nausea and fatigue until a couple of weeks ago.  Felt different than first miscarriage in Dec/Michael.  Very sad and surprised today.  Otherwise healthy  Hx early miscarriage in Dec/Michael --spontaneously miscarried but took 8wks for hcg to normalize.  Had 2 normal menses since that time    Patient's last menstrual period was 2018 (exact date)..   Patient is sexually active, .  Using none for contraception.    reports that she has never smoked. She has never used smokeless tobacco.    STD testing offered?  Declined    Health maintenance updated:  yes    Today's PHQ-2 Score:   PHQ-2 (  [employer]) 10/20/2017   Q1: Little interest or pleasure in doing things 0   Q2: Feeling down, depressed or hopeless 0   PHQ-2 Score 0     Today's PHQ-9 Score:   PHQ-9 SCORE 2018   Total Score 2     Today's VICKY-7 Score:   VICKY-7 SCORE 2018   Total Score 4       Problem list and histories reviewed & adjusted, as indicated.  Additional history: as documented.    Patient Active Problem List   Diagnosis     Persistent depressive disorder with anxious distress, currently mild     Dermatofibroma     History of miscarriage     Past Surgical History:   Procedure Laterality Date     HC TOOTH EXTRACTION W/FORCEP        Social History   Substance Use Topics     Smoking status: Never Smoker     Smokeless tobacco: Never Used     Alcohol use Yes      Comment: 4-5 drinks a week       Problem (# of Occurrences) Relation (Name,Age of Onset)    Arthritis (1) Paternal Grandmother    Breast Cancer (1) Mother (60)     "Coronary Artery Disease (1) Paternal Grandfather    Dementia (1) Maternal Grandmother    Depression (1) Father    Hyperlipidemia (1) Father    Hypertension (1) Father    Obesity (2) Mother, Father    Unknown/Adopted (1) Brother            Current Outpatient Prescriptions   Medication Sig     doxylamine (UNISOM) 25 MG TABS tablet Take 25 mg by mouth At Bedtime     FOLIC ACID PO Take 1 mg by mouth daily     Prenatal Vit-Fe Fumarate-FA (PRENATAL MULTIVITAMIN PLUS IRON) 27-0.8 MG TABS per tablet Take 1 tablet by mouth daily     Pyridoxine HCl (VITAMIN B6 PO)      VITAMIN D, CHOLECALCIFEROL, PO Take 2,000 Units by mouth 2 times daily     No current facility-administered medications for this visit.      No Known Allergies    ROS:  Constitutional: Fatigue  Breast: Tenderness  Gastrointestinal: Nausea    OBJECTIVE:     /72  Ht 5' 6.5\" (1.689 m)  Wt 188 lb (85.3 kg)  LMP 03/20/2018 (Exact Date)  Breastfeeding? No  BMI 29.89 kg/m2  Body mass index is 29.89 kg/(m^2).    Exam:  Constitutional:  Appearance: Well nourished, well developed alert, in no acute distress  Neurologic/Psychiatric:  Mental Status:  Oriented X3      In-Clinic Test Results:  Results for orders placed or performed in visit on 05/29/18 (from the past 24 hour(s))   US OB Transvaginal Only    Narrative    Obstetrical Ultrasound Report  OB 1st trimester U/S -  Transvaginal    [facility]  Referring Provider: DR GOFF  Sonographer: MAXINE KAISER RDMS  Indication:  Viability check:    History: Recent miscarriage  Dating (mm/dd/yyyy):   LMP: 03/20/2018   EDC: 12/25/2018      GA by LMP:  10w0d                        Current Scan On:  05/29/2018                    The calculation of the gestational age by current scan was based on CRL.  Anatomy Scan:  [place] gestation.  Biometry:  CRL                                           3.9mm                                       6w1d                                                        "              Yolk Sac                         Not identified                                                                                                 Fetal heart activity: Rate and rhythm is within normal limits.  Fetal   heart rate: 0 bpm  Findings: Irregular shaped IU gestational sac measuring 7w5d in size.   Possible fetal pole 6w1d in size with no fht's. No iu hematoma or fibroid.   Probable missed ab.      Maternal Structures:  Cervix: The cervix appears long and closed.  Right Adnexa: Normal  Left Adnexa: Normal  Impression:   Early arenas IUP with no cardiac activity seen,   consistant with a missed AB.  Gestational sac measures 7w5d and crown rump   length measures 6+1d by today's ultrasound.    Eboni Garcia MD         ASSESSMENT/PLAN:                                                        ICD-10-CM    1. Missed  O02.1 HCG quantitative pregnancy       Patient Instructions   Long discussion today regarding early miscarriage.  Discussed causes and reassurance given that this is nothing that she could have changed, prevented, etc.  Discussed possible management options at this time including expectant management, medical management with cytotec or surgical management with D&C.  Would like to monitor for now and may consider surgical options in 2wks if no results.  Discussed r/b/a of each.  Will check pregnancy hormone level today.  Blood type A+  Will keep us updated      30min spent in discussion about miscarriage, diagnosis, management, etc.  All time spent in history review and counseling.    Eboni Garcia MD  [facility] [place]

## 2018-05-29 NOTE — PATIENT INSTRUCTIONS
Long discussion today regarding early miscarriage.  Discussed causes and reassurance given that this is nothing that she could have changed, prevented, etc.  Discussed possible management options at this time including expectant management, medical management with cytotec or surgical management with D&C.  Would like to monitor for now and may consider surgical options in 2wks if no results.  Discussed r/b/a of each.  Will check pregnancy hormone level today.  Blood type A+  Will keep us updated

## 2018-05-30 LAB — B-HCG SERPL-ACNC: ABNORMAL IU/L (ref 0–5)

## 2018-05-30 ASSESSMENT — ANXIETY QUESTIONNAIRES: GAD7 TOTAL SCORE: 4

## 2018-05-30 ASSESSMENT — PATIENT HEALTH QUESTIONNAIRE - PHQ9: SUM OF ALL RESPONSES TO PHQ QUESTIONS 1-9: 2

## 2018-06-04 ENCOUNTER — MYC MEDICAL ADVICE (OUTPATIENT)
Dept: OBGYN | Facility: CLINIC | Age: 28
End: 2018-06-04

## 2018-06-04 NOTE — TELEPHONE ENCOUNTER
Appreciate the update and will wait to hear from her over the next week regarding her bleeding/miscarriage.  If still no bleeding/cramping consistent with miscarriage by the end of this week/early next week, then I would recommend she comes in to see us.  With regards to the article and Fertilome test, I did review the article --this is the first that I have heard or seen of this.  Sounds like a very new test and VERY limited usage at this point.  I would recommend that once we get through her current loss, we will try progesterone and if we are not successful, then I would anticipate referring her to an infertility specialist (Dr. Isa Wen at [facility]) who may have more experience with testing of this sort.  Thanks for the article and keep us updated!

## 2018-06-06 ENCOUNTER — TRANSFERRED RECORDS (OUTPATIENT)
Dept: HEALTH INFORMATION MANAGEMENT | Facility: CLINIC | Age: 28
End: 2018-06-06

## 2018-06-06 ENCOUNTER — MYC MEDICAL ADVICE (OUTPATIENT)
Dept: OBGYN | Facility: CLINIC | Age: 28
End: 2018-06-06

## 2018-06-07 ENCOUNTER — TELEPHONE (OUTPATIENT)
Dept: NURSING | Facility: CLINIC | Age: 28
End: 2018-06-07

## 2018-06-07 NOTE — TELEPHONE ENCOUNTER
Pt is currently in [place]. Had to go to ED last night with heavy bleeding and passing clots. Pt stated she had US which showed that there is still some of the gestational sac in the uterus. Her bleeding has decreased, no clots. Some cramping. Hgb in ED was 13.1, no fever. Pt is returning home this weekend and wants to know what type of follow-up needs to be done. Does Dr. Garcia want another US. Pt also wants to know if she should just come in and be seen.  Pt has requested records from [place] ED be sent to Dr. Garcia. Reviewed bleeding precautions and pt stated she understands. No further questions. Has good support with  and mother-in-law. Routing to Dr. Garcia. Please advise.     Pt would like to be reached on her cell phone or mother-in-lase phone [phone].

## 2018-06-08 NOTE — TELEPHONE ENCOUNTER
I would have her come in early next week for repeat us with me --can add her on either Tues or Wed

## 2018-06-08 NOTE — TELEPHONE ENCOUNTER
Great --glad to hear the bleeding has slowed.  Thankfully her hemoglobin was normal and our bodies are able to keep up fairly well in most instances.  Take it easy for the weekend and we'll see her Wed.

## 2018-06-08 NOTE — TELEPHONE ENCOUNTER
"Pt returned call and scheduled appt and U/S for Wednesday, June 13th with Dr. Garcia. Pt reported her experience from ED 6/6/18 with bleeding and clots, stating she thinks she lost about \"half a gallon\" of blood. They were able to use speculum and removed fragments of tissue with gauze. Her bleeding was a bit heavy after that but then has since slowed down significantly. Feeling better.   Encouraged to replace fluids, drink and eat to replenish herself. Call or seek emergent care as necessary with further bleeding episodes. Pt still in [place] until Sunday.  Verbalized understanding and no further questions.  Ayala MUNOZ RN    "

## 2018-06-13 ENCOUNTER — OFFICE VISIT (OUTPATIENT)
Dept: OBGYN | Facility: CLINIC | Age: 28
End: 2018-06-13
Payer: COMMERCIAL

## 2018-06-13 ENCOUNTER — RADIANT APPOINTMENT (OUTPATIENT)
Dept: ULTRASOUND IMAGING | Facility: CLINIC | Age: 28
End: 2018-06-13
Payer: COMMERCIAL

## 2018-06-13 VITALS — BODY MASS INDEX: 30.21 KG/M2 | DIASTOLIC BLOOD PRESSURE: 70 MMHG | SYSTOLIC BLOOD PRESSURE: 110 MMHG | WEIGHT: 190 LBS

## 2018-06-13 DIAGNOSIS — O03.4 INCOMPLETE MISCARRIAGE: Primary | ICD-10-CM

## 2018-06-13 DIAGNOSIS — O36.80X0 ENCOUNTER TO DETERMINE FETAL VIABILITY OF PREGNANCY, SINGLE OR UNSPECIFIED FETUS: ICD-10-CM

## 2018-06-13 LAB — HGB BLD-MCNC: 11.6 G/DL (ref 11.7–15.7)

## 2018-06-13 PROCEDURE — 99214 OFFICE O/P EST MOD 30 MIN: CPT | Performed by: OBSTETRICS & GYNECOLOGY

## 2018-06-13 PROCEDURE — 84702 CHORIONIC GONADOTROPIN TEST: CPT | Performed by: OBSTETRICS & GYNECOLOGY

## 2018-06-13 PROCEDURE — 88305 TISSUE EXAM BY PATHOLOGIST: CPT | Performed by: OBSTETRICS & GYNECOLOGY

## 2018-06-13 PROCEDURE — 36415 COLL VENOUS BLD VENIPUNCTURE: CPT | Performed by: OBSTETRICS & GYNECOLOGY

## 2018-06-13 PROCEDURE — 85018 HEMOGLOBIN: CPT | Performed by: OBSTETRICS & GYNECOLOGY

## 2018-06-13 PROCEDURE — 76817 TRANSVAGINAL US OBSTETRIC: CPT | Performed by: OBSTETRICS & GYNECOLOGY

## 2018-06-13 PROCEDURE — 00000159 ZZHCL STATISTIC H-SEND OUTS PREP: Performed by: OBSTETRICS & GYNECOLOGY

## 2018-06-13 NOTE — PATIENT INSTRUCTIONS
Ultrasound images reviewed with Melissa both before and after removal of tissue from cervical os.  Will continue to monitor bleeding/cramping in the next few days.  I will call Melissa with hcg results later this week and to follow up on symptoms.  If still bleeding early next week, will likely repeat us and re-visit idea of suction D&C if any concern for retained products of conception.  Bleeding precautions reviewed.

## 2018-06-13 NOTE — PROGRESS NOTES
SUBJECTIVE:                                                   Melissa Macdonald is a 27 year old female who presents to clinic today for the following health issue(s):  Patient presents with:  Ultrasound: Viability      HPI:  Here today for follow up to recent partial miscarriage.  Was initially seen in our office for new OB visit on  at which time she was 10+0wks based on sure LMP.  At that time had nonviable IUP at 6+1wks with irregular shaped gestational sac and no heart tones.  At that time was having mild spotting and elected for expectant management.  Quant at that time was 15,669.  Melissa reports that she started having spotting the following Monday and Tuesday and then progressed to heavy vaginal bleeding on Wed while en route to [place] for family graduation.  +cramping and passage of clots.  Let up briefly and then started bleeding heavily at that time.  Was seen in ED and found to have incomplete miscarriage.  +gestational sac.  With exam and bedside clearing of clots, her bleeding slowed considerably.  Hemodynamically stable and sent home to complete likely miscarriage.  Since being home, has had period like bleeding since .  Occ small clot; changing a regular pad every 3-4hrs.  No cramping.  No lightheadedness/dizziness/SOB, fatigue.  Overall feeling well.    Initial us today shows only blood clot in endometrial cavity with larger clot in lower uterine segment/cervix.   Based on US appearance, SSE done at bedside and clot seen protruding from cervix.  With patient permission, tissue and moderately large clot removed from cervix with ring forceps.  Only small amount of post removal bleeding.  Repeat US shows clot cleared with still some blood/clot in endometrial lining    Patient's last menstrual period was 2018 (exact date)..   Patient is sexually active, .  Using none for contraception.    reports that she has never smoked. She has never used smokeless tobacco.    STD testing  offered?  Declined    Health maintenance updated:  yes    Today's PHQ-2 Score:   PHQ-2 ( 1999 [employer]) 10/20/2017   Q1: Little interest or pleasure in doing things 0   Q2: Feeling down, depressed or hopeless 0   PHQ-2 Score 0     Today's PHQ-9 Score:   PHQ-9 SCORE 5/29/2018   Total Score 2     Today's VICKY-7 Score:   VICKY-7 SCORE 5/29/2018   Total Score 4       Problem list and histories reviewed & adjusted, as indicated.  Additional history: as documented.    Patient Active Problem List   Diagnosis     Persistent depressive disorder with anxious distress, currently mild     Dermatofibroma     History of miscarriage     Past Surgical History:   Procedure Laterality Date     HC TOOTH EXTRACTION W/FORCEP        Social History   Substance Use Topics     Smoking status: Never Smoker     Smokeless tobacco: Never Used     Alcohol use Yes      Comment: 4-5 drinks a week       Problem (# of Occurrences) Relation (Name,Age of Onset)    Arthritis (1) Paternal Grandmother    Breast Cancer (1) Mother (60)    Coronary Artery Disease (1) Paternal Grandfather    Dementia (1) Maternal Grandmother    Depression (1) Father    Hyperlipidemia (1) Father    Hypertension (1) Father    Obesity (2) Mother, Father    Unknown/Adopted (1) Brother            Current Outpatient Prescriptions   Medication Sig     doxylamine (UNISOM) 25 MG TABS tablet Take 25 mg by mouth At Bedtime     FOLIC ACID PO Take 1 mg by mouth daily     Prenatal Vit-Fe Fumarate-FA (PRENATAL MULTIVITAMIN PLUS IRON) 27-0.8 MG TABS per tablet Take 1 tablet by mouth daily     Pyridoxine HCl (VITAMIN B6 PO)      VITAMIN D, CHOLECALCIFEROL, PO Take 2,000 Units by mouth 2 times daily     No current facility-administered medications for this visit.      No Known Allergies    ROS:  12 point review of systems negative other than symptoms noted below.    OBJECTIVE:     /70  Wt 190 lb (86.2 kg)  LMP 03/20/2018 (Exact Date)  Breastfeeding? No  BMI 30.21 kg/m2  Body mass index  is 30.21 kg/(m^2).    Exam:  Constitutional:  Appearance: Well nourished, well developed alert, in no acute distress  Gastrointestinal:  Abdominal Examination:  Abdomen nontender to palpation, tone normal without rigidity or guarding, no masses present, umbilicus without lesions; Liver/Spleen:  No hepatomegaly present, liver nontender to palpation; Hernias:  No hernias present  Skin:General Inspection:  No rashes present, no lesions present, no areas of discoloration; Genitalia and Groin:  No rashes present, no lesions present, no areas of discoloration, no masses present.  Neurologic/Psychiatric:  Mental Status:  Oriented X3   Pelvic Exam:  External Genitalia:     Normal appearance for age, no discharge present, no tenderness present, no inflammatory lesions present, color normal  Vagina:     Normal vaginal vault without central or paravaginal defects, no discharge present, no inflammatory lesions present, no masses present  Bladder:     Nontender to palpation  Urethra:   Urethral Body:  Urethra palpation normal, urethra structural support normal   Urethral Meatus:  No erythema or lesions present  Cervix:     Appearance healthy, no lesions present, nontender to palpation, no bleeding present; +CLOT NOTED AT CERVICAL OS  Uterus:     Uterus: firm, normal sized and nontender, midplane in position.   Adnexa:     No adnexal tenderness present, no adnexal masses present  Perineum:     Perineum within normal limits, no evidence of trauma, no rashes or skin lesions present  Anus:     Anus within normal limits, no hemorrhoids present  Inguinal Lymph Nodes:     No lymphadenopathy present  Pubic Hair:     Normal pubic hair distribution for age  Genitalia and Groin:     No rashes present, no lesions present, no areas of discoloration, no masses present    **SSE/BEDSIDE PROCEDURE**  With placement of speculum, tissue/clot appreciated protruding from cervical os.  With ring forcep and dolphin nose grasper, tissue removed with  gentle traction.  Approximately 5-6cm clot removed.  Cannot rule out residual products of conception.  Patient tolerated the procedure well and experienced only mild cramping.  Minimal bleeding following removal/evacuation.       In-Clinic Test Results:  Results for orders placed or performed in visit on 06/13/18 (from the past 24 hour(s))   Hemoglobin   Result Value Ref Range    Hemoglobin 11.6 (L) 11.7 - 15.7 g/dL       ASSESSMENT/PLAN:                                                        ICD-10-CM    1. Incomplete miscarriage O03.4 HCG quantitative pregnancy     Hemoglobin     Surgical pathology exam       Patient Instructions   Ultrasound images reviewed with Melissa both before and after removal of tissue from cervical os.  Will continue to monitor bleeding/cramping in the next few days.  I will call Melissa with hcg results later this week and to follow up on symptoms.  If still bleeding early next week, will likely repeat us and re-visit idea of suction D&C if any concern for retained products of conception.  Bleeding precautions reviewed.      30min visit discussing/reviewing ultrasound findings and with removal of blood clot/tissue from cervical os; 50% of time spent in discussion/counseling and 50  % of time spent in minor procedure/evacuation    Eboni Garcia MD  [facility]

## 2018-06-13 NOTE — MR AVS SNAPSHOT
After Visit Summary   6/13/2018    Melissa Macdonald    MRN: [id number]           Patient Information     Date Of Birth          1990        Visit Information        Provider Department      6/13/2018 2:20 PM Eboni Garcia MD [facility]        Today's Diagnoses     Incomplete miscarriage    -  1      Care Instructions    Ultrasound images reviewed with Melissa both before and after removal of tissue from cervical os.  Will continue to monitor bleeding/cramping in the next few days.  I will call Melissa with hcg results later this week and to follow up on symptoms.  If still bleeding early next week, will likely repeat us and re-visit idea of suction D&C if any concern for retained products of conception.  Bleeding precautions reviewed.          Follow-ups after your visit        Follow-up notes from your care team     Return if symptoms worsen or fail to improve.      Who to contact     If you have questions or need follow up information about today's clinic visit or your schedule please contact [facility] directly at [phone].  Normal or non-critical lab and imaging results will be communicated to you by [employer], letter or phone within 4 business days after the clinic has received the results. If you do not hear from us within 7 days, please contact the clinic through Direct Sitterst or phone. If you have a critical or abnormal lab result, we will notify you by phone as soon as possible.  Submit refill requests through [employer] or call your pharmacy and they will forward the refill request to us. Please allow 3 business days for your refill to be completed.          Additional Information About Your Visit        Be[employer] Information     [employer] gives you secure access to your electronic health record. If you see a primary care provider, you can also send messages to your care team and make appointments. If you have questions, please call your primary care  clinic.  If you do not have a primary care provider, please call [phone] and they will assist you.        Care EveryWhere ID     This is your Care EveryWhere ID. This could be used by other organizations to access your [place] medical records  [id number]        Your Vitals Were     Last Period Breastfeeding? BMI (Body Mass Index)             03/20/2018 (Exact Date) No 30.21 kg/m2          Blood Pressure from Last 3 Encounters:   06/13/18 110/70   05/29/18 122/72   05/08/18 116/72    Weight from Last 3 Encounters:   06/13/18 190 lb (86.2 kg)   05/29/18 188 lb (85.3 kg)   05/08/18 188 lb (85.3 kg)              We Performed the Following     HCG quantitative pregnancy     Hemoglobin     Surgical pathology exam        Primary Care Provider Office Phone # Fax #    Kristina Adriane Mccoy [phone] [address] [facility] [address]        Equal Access to Services     LEIGH ANN DOYLE : Hadii sage ku hadasho Soomaali, waaxda luqadaha, qaybta kaalmada adeegyada, elodia reed . So [facility] [phone].    ATENCIÓN: Si habla español, tiene a alberto disposición servicios gratuitos de asistencia lingüística. Llame al [phone].    We comply with applicable federal civil rights laws and [place] laws. We do not discriminate on the basis of race, color, national origin, age, disability, sex, sexual orientation, or gender identity.            Thank you!     Thank you for choosing [facility]  for your care. Our goal is always to provide you with excellent care. Hearing back from our patients is one way we can continue to improve our services. Please take a few minutes to complete the written survey that you may receive in the mail after your visit with us. Thank you!             Your Updated Medication List - Protect others around you: Learn how to safely use, store and throw away your medicines at www.disposemymeds.org.          This list is accurate as of  6/13/18  6:26 PM.  Always use your most recent med list.                   Brand Name Dispense Instructions for use Diagnosis    doxylamine 25 MG Tabs tablet    UNISOM     Take 25 mg by mouth At Bedtime        FOLIC ACID PO      Take 1 mg by mouth daily        prenatal multivitamin plus iron 27-0.8 MG Tabs per tablet      Take 1 tablet by mouth daily        VITAMIN B6 PO           VITAMIN D (CHOLECALCIFEROL) PO      Take 2,000 Units by mouth 2 times daily

## 2018-06-14 LAB — B-HCG SERPL-ACNC: 188 IU/L (ref 0–5)

## 2018-06-15 LAB — COPATH REPORT: NORMAL

## 2018-06-15 NOTE — PROGRESS NOTES
Patient notified of lab results and dropping quantitative HCG.  Will return for repeat check on Monday afternoon.

## 2018-06-18 DIAGNOSIS — O03.4 INCOMPLETE MISCARRIAGE: ICD-10-CM

## 2018-06-18 PROCEDURE — 36415 COLL VENOUS BLD VENIPUNCTURE: CPT | Performed by: OBSTETRICS & GYNECOLOGY

## 2018-06-18 PROCEDURE — 84702 CHORIONIC GONADOTROPIN TEST: CPT | Performed by: OBSTETRICS & GYNECOLOGY

## 2018-06-19 LAB — B-HCG SERPL-ACNC: 64 IU/L (ref 0–5)

## 2018-06-19 NOTE — PROGRESS NOTES
Message left with hormone results --pregnancy hormone continues to drop.  I would like to follow this number until it reaches <5 which is considered negative.  Also wanted to follow up on Melissa's bleeding --will touch base tomorrow to determine course of follow up (repeat US for ongoing bleeding vs repeat quant in 1-2 weeks)

## 2018-06-20 ENCOUNTER — TELEPHONE (OUTPATIENT)
Dept: NURSING | Facility: CLINIC | Age: 28
End: 2018-06-20

## 2018-06-20 NOTE — TELEPHONE ENCOUNTER
Pt returned call. States bleeding is minimal. Wears panty liner and could wear all day but chooses not to do to hygiene. Discharge is mostly brown when she wipes. Very minimal. Pt transfered to scheduling to make appointment for HCG end of next week. Routing to Dr. Jose HEWITT.     Moises Torres-   Here are the results of your pregnancy hormone testing.  Your number continues to drop as we would like.  Hopefully you got my message --so sorry for the late call today.  I was also wanting to follow up with you on your bleeding.  If your bleeding is minimal or has stopped, I would recommend that we repeat your hormone level in 1-2 weeks.  If you are still bleeding, I would consider repeating your ultrasound.  Let me know --you can give us a call tomorrow at your convenience at [phone].   Thanks and sorry I missed you tonight!   Dr. Garcia    Notes Recorded by Eboni Garcia MD on 6/19/2018 at 6:13 PM  Message left with hormone results --pregnancy hormone continues to drop.  I would like to follow this number until it reaches <5 which is considered negative.  Also wanted to follow up on Melissa's bleeding --will touch base tomorrow to determine course of follow up (repeat US for ongoing bleeding vs repeat quant in 1-2 weeks)

## 2018-06-20 NOTE — TELEPHONE ENCOUNTER
Let's have her come in on Monday July 1st for HCG --I'm out of town next week and this way, I can follow it up

## 2018-06-20 NOTE — TELEPHONE ENCOUNTER
Left message on pt's voicemail regarding information below. Pt asked to call back regarding switching appointment from 6/29 to 7/1

## 2018-07-02 DIAGNOSIS — O03.4 INCOMPLETE MISCARRIAGE: Primary | ICD-10-CM

## 2018-07-02 LAB — B-HCG SERPL-ACNC: 11 IU/L (ref 0–5)

## 2018-07-02 PROCEDURE — 84702 CHORIONIC GONADOTROPIN TEST: CPT | Performed by: OBSTETRICS & GYNECOLOGY

## 2018-07-02 PROCEDURE — 36415 COLL VENOUS BLD VENIPUNCTURE: CPT | Performed by: OBSTETRICS & GYNECOLOGY

## 2018-07-03 NOTE — PROGRESS NOTES
Please inform of results --hormone continues to drop nicely!  Let's have have do a home UPT in 2 weeks --if negative, we no longer need to test.  If it is positive, I would like her to come in for another quant.  Please also confirm that her bleeding is done or minimal

## 2018-07-16 ENCOUNTER — MYC MEDICAL ADVICE (OUTPATIENT)
Dept: OBGYN | Facility: CLINIC | Age: 28
End: 2018-07-16

## 2018-07-16 NOTE — TELEPHONE ENCOUNTER
"Please see my chart message below. Routing to Dr. Garcia Please review.     Additional my chart message    Hi Dr. Garcia,     Sorry, I forgot to mention in my other message from this morning that I'm sans phone right now after forgetting it in [place] last night. I can be reached via [employer], and I can call the clinic at a specific time if needed. ([place] it's easier for me to go to one of the conference rooms can call from there rather than have a health discussion at my desk, and I'll keep my personal e-mail pulled-up in case of any e-mail messages.)     Thanks,     Melissa   "

## 2018-07-17 NOTE — TELEPHONE ENCOUNTER
Great --we can discuss the use of progesterone for any future pregnancies at her August appt with me.  Nothing else to add at this point and appreciate the update

## 2018-08-03 ENCOUNTER — OFFICE VISIT (OUTPATIENT)
Dept: OBGYN | Facility: CLINIC | Age: 28
End: 2018-08-03
Payer: COMMERCIAL

## 2018-08-03 VITALS — SYSTOLIC BLOOD PRESSURE: 116 MMHG | DIASTOLIC BLOOD PRESSURE: 78 MMHG | WEIGHT: 192 LBS | BODY MASS INDEX: 30.53 KG/M2

## 2018-08-03 DIAGNOSIS — N96 HISTORY OF RECURRENT MISCARRIAGES: Primary | ICD-10-CM

## 2018-08-03 PROCEDURE — 00000167 ZZHCL STATISTIC INR NC: Performed by: OBSTETRICS & GYNECOLOGY

## 2018-08-03 PROCEDURE — 86147 CARDIOLIPIN ANTIBODY EA IG: CPT | Mod: 59 | Performed by: OBSTETRICS & GYNECOLOGY

## 2018-08-03 PROCEDURE — 84439 ASSAY OF FREE THYROXINE: CPT | Performed by: OBSTETRICS & GYNECOLOGY

## 2018-08-03 PROCEDURE — 00000401 ZZHCL STATISTIC THROMBIN TIME NC: Performed by: OBSTETRICS & GYNECOLOGY

## 2018-08-03 PROCEDURE — 85613 RUSSELL VIPER VENOM DILUTED: CPT | Performed by: OBSTETRICS & GYNECOLOGY

## 2018-08-03 PROCEDURE — 86147 CARDIOLIPIN ANTIBODY EA IG: CPT | Performed by: OBSTETRICS & GYNECOLOGY

## 2018-08-03 PROCEDURE — 85730 THROMBOPLASTIN TIME PARTIAL: CPT | Performed by: OBSTETRICS & GYNECOLOGY

## 2018-08-03 PROCEDURE — 99213 OFFICE O/P EST LOW 20 MIN: CPT | Performed by: OBSTETRICS & GYNECOLOGY

## 2018-08-03 PROCEDURE — 84443 ASSAY THYROID STIM HORMONE: CPT | Performed by: OBSTETRICS & GYNECOLOGY

## 2018-08-03 PROCEDURE — 36415 COLL VENOUS BLD VENIPUNCTURE: CPT | Performed by: OBSTETRICS & GYNECOLOGY

## 2018-08-03 NOTE — PROGRESS NOTES
SUBJECTIVE:                                                   Melissa Macdonald is a 27 year old female who presents to clinic today for the following health issue(s):  Patient presents with:  Consult: f/u to miscarriage 2018, also in 2017, discuss next steps        HPI:  Here today to follow up on recent miscarriage.  2nd first trimester miscarriage --first occurred with outside clinic in Dec/2018.    With most recent pregnancy, was found to have only 6wk pregnancy at time of 10wk new OB visit.  Opted for expected management at that time.  Spontaneously miscarried a few weeks later.  Had bleeding until early July --followed with serial quants --had negative home UPT in mid July.  Has now had normal menses () x 7d.  No issues.  Several questions today about miscarriage, causes, testing, treatment/prevention, referrals, etc.  All great and appropriate questions  Somewhat hesitant about trying to conceive again given recent miscarriages    Patient's last menstrual period was 2018 (exact date)..   Patient is sexually active, .  Using periodic abstinence for contraception.    reports that she has never smoked. She has never used smokeless tobacco.    STD testing offered?  Declined    Health maintenance updated:  yes    Today's PHQ-2 Score:   PHQ-2 (  [employer]) 10/20/2017   Q1: Little interest or pleasure in doing things 0   Q2: Feeling down, depressed or hopeless 0   PHQ-2 Score 0     Today's PHQ-9 Score:   PHQ-9 SCORE 2018   Total Score 2     Today's VICKY-7 Score:   VICKY-7 SCORE 2018   Total Score 4       Problem list and histories reviewed & adjusted, as indicated.  Additional history: as documented.    Patient Active Problem List   Diagnosis     Persistent depressive disorder with anxious distress, currently mild     Dermatofibroma     History of miscarriage     Past Surgical History:   Procedure Laterality Date     HC TOOTH EXTRACTION W/FORCEP        Social History   Substance Use  Topics     Smoking status: Never Smoker     Smokeless tobacco: Never Used     Alcohol use Yes      Comment: 4-5 drinks a week       Problem (# of Occurrences) Relation (Name,Age of Onset)    Arthritis (1) Paternal Grandmother    Breast Cancer (1) Mother (60)    Coronary Artery Disease (1) Paternal Grandfather    Dementia (1) Maternal Grandmother    Depression (1) Father    Hyperlipidemia (1) Father    Hypertension (1) Father    Obesity (2) Mother, Father    Unknown/Adopted (1) Brother            Current Outpatient Prescriptions   Medication Sig     doxylamine (UNISOM) 25 MG TABS tablet Take 25 mg by mouth At Bedtime     FOLIC ACID PO Take 1 mg by mouth daily     Prenatal Vit-Fe Fumarate-FA (PRENATAL MULTIVITAMIN PLUS IRON) 27-0.8 MG TABS per tablet Take 1 tablet by mouth daily     Pyridoxine HCl (VITAMIN B6 PO)      VITAMIN D, CHOLECALCIFEROL, PO Take 2,000 Units by mouth 2 times daily     No current facility-administered medications for this visit.      No Known Allergies    ROS:  Psychiatric: Anxiety    OBJECTIVE:     /78  Wt 192 lb (87.1 kg)  LMP 07/26/2018 (Exact Date)  Breastfeeding? No  BMI 30.53 kg/m2  Body mass index is 30.53 kg/(m^2).    Exam:  Constitutional:  Appearance: Well nourished, well developed alert, in no acute distress  Neurologic/Psychiatric:  Mental Status:  Oriented X3      In-Clinic Test Results:  No results found for this or any previous visit (from the past 24 hour(s)).    ASSESSMENT/PLAN:                                                        ICD-10-CM    1. History of recurrent miscarriages N96 Anticardiolipin Ab, IgG/M, Qn ([facility])     Lupus Anticoagulant Panel     T4 free     TSH       Patient Instructions   Long discussion regarding recent miscarriage and history of prior miscarriage.  Discussed likely causes of early miscarriage including chromosomal issues, luteal phase defect, etc.  Discussed testing today.  Discussed possible vaginal progesterone with future pregnancy  as well as daily baby ASA.  Emphasized importance of optimization of health prior to pregnancy with healthy diet and lifestyle.  Encouraged Melissa to resume daily prenatal vitamin.  Discussed possible LUIS FELIPE referral if another miscarriage were to occur      20min spent in discussion as noted above; all time spent in counseling    Eboni Garcia MD  [facility]

## 2018-08-03 NOTE — MR AVS SNAPSHOT
After Visit Summary   8/3/2018    Melissa Macdonald    MRN: [id number]           Patient Information     Date Of Birth          1990        Visit Information        Provider Department      8/3/2018 1:50 PM Eboni Garcia MD [facility]        Today's Diagnoses     History of recurrent miscarriages    -  1      Care Instructions    Long discussion regarding recent miscarriage and history of prior miscarriage.  Discussed likely causes of early miscarriage including chromosomal issues, luteal phase defect, etc.  Discussed testing today.  Discussed possible vaginal progesterone with future pregnancy as well as daily baby ASA.  Emphasized importance of optimization of health prior to pregnancy with healthy diet and lifestyle.  Encouraged Melissa to resume daily prenatal vitamin.  Discussed possible LUIS FELIPE referral if another miscarriage were to occur          Follow-ups after your visit        Follow-up notes from your care team     Return if symptoms worsen or fail to improve.      Who to contact     If you have questions or need follow up information about today's clinic visit or your schedule please contact [facility] directly at [phone].  Normal or non-critical lab and imaging results will be communicated to you by [employer], letter or phone within 4 business days after the clinic has received the results. If you do not hear from us within 7 days, please contact the clinic through [employer] or phone. If you have a critical or abnormal lab result, we will notify you by phone as soon as possible.  Submit refill requests through [employer] or call your pharmacy and they will forward the refill request to us. Please allow 3 business days for your refill to be completed.          Additional Information About Your Visit        [employer] Information     [employer] gives you secure access to your electronic health record. If you see a primary care provider, you can also  send messages to your care team and make appointments. If you have questions, please call your primary care clinic.  If you do not have a primary care provider, please call [phone] and they will assist you.        Care EveryWhere ID     This is your Care EveryWhere ID. This could be used by other organizations to access your [place] medical records  [id number]        Your Vitals Were     Last Period Breastfeeding? BMI (Body Mass Index)             07/26/2018 (Exact Date) No 30.53 kg/m2          Blood Pressure from Last 3 Encounters:   08/03/18 116/78   06/13/18 110/70   05/29/18 122/72    Weight from Last 3 Encounters:   08/03/18 192 lb (87.1 kg)   06/13/18 190 lb (86.2 kg)   05/29/18 188 lb (85.3 kg)              We Performed the Following     Anticardiolipin Ab, IgG/M, Qn ([facility])     Lupus Anticoagulant Panel     T4 free     TSH        Primary Care Provider Office Phone # Fax #    Kristina Adriane Mccoy [phone] [address] [facility] [address]        Equal Access to Services     DIANA DOYLE : Hadii sage ku hadasho Socharleen, waaxda luqadaha, qaybta kaalmada quincy, elodia reed . So [facility] [phone].    ATENCIÓN: Si habla español, tiene a alberto disposición servicios gratuitos de asistencia lingüística. Ll[facility] [phone].    We comply with applicable federal civil rights laws and [place] laws. We do not discriminate on the basis of race, color, national origin, age, disability, sex, sexual orientation, or gender identity.            Thank you!     Thank you for choosing [facility]  for your care. Our goal is always to provide you with excellent care. Hearing back from our patients is one way we can continue to improve our services. Please take a few minutes to complete the written survey that you may receive in the mail after your visit with us. Thank you!             Your Updated Medication List - Protect others around  you: Learn how to safely use, store and throw away your medicines at www.disposemymeds.org.          This list is accurate as of 8/3/18  3:48 PM.  Always use your most recent med list.                   Brand Name Dispense Instructions for use Diagnosis    doxylamine 25 MG Tabs tablet    UNISOM     Take 25 mg by mouth At Bedtime        FOLIC ACID PO      Take 1 mg by mouth daily        prenatal multivitamin plus iron 27-0.8 MG Tabs per tablet      Take 1 tablet by mouth daily        VITAMIN B6 PO           VITAMIN D (CHOLECALCIFEROL) PO      Take 2,000 Units by mouth 2 times daily

## 2018-08-03 NOTE — PATIENT INSTRUCTIONS
Long discussion regarding recent miscarriage and history of prior miscarriage.  Discussed likely causes of early miscarriage including chromosomal issues, luteal phase defect, etc.  Discussed testing today.  Discussed possible vaginal progesterone with future pregnancy as well as daily baby ASA.  Emphasized importance of optimization of health prior to pregnancy with healthy diet and lifestyle.  Encouraged Melissa to resume daily prenatal vitamin.  Discussed possible LUIS FELIPE referral if another miscarriage were to occur

## 2018-08-04 LAB
T4 FREE SERPL-MCNC: 0.92 NG/DL (ref 0.76–1.46)
TSH SERPL DL<=0.005 MIU/L-ACNC: 0.93 MU/L (ref 0.4–4)

## 2018-08-07 LAB — LA PPP-IMP: NEGATIVE

## 2018-08-08 NOTE — PROGRESS NOTES
Please inform of normal results --so far testing is normal; thyroid and lupus testing is negative.  I mistakingly added the wrong antiphospholipid testing so that just got added on today.  I will let her know when those results are back

## 2018-08-09 LAB
CARDIOLIPIN ANTIBODY IGG: <1.6 GPL-U/ML (ref 0–19.9)
CARDIOLIPIN ANTIBODY IGM: 3.2 MPL-U/ML (ref 0–19.9)

## 2018-08-28 ENCOUNTER — OFFICE VISIT (OUTPATIENT)
Dept: OBGYN | Facility: CLINIC | Age: 28
End: 2018-08-28
Payer: COMMERCIAL

## 2018-08-28 VITALS
HEART RATE: 74 BPM | SYSTOLIC BLOOD PRESSURE: 122 MMHG | WEIGHT: 190 LBS | DIASTOLIC BLOOD PRESSURE: 82 MMHG | BODY MASS INDEX: 30.21 KG/M2 | OXYGEN SATURATION: 100 %

## 2018-08-28 DIAGNOSIS — N96 HISTORY OF RECURRENT MISCARRIAGES: Primary | ICD-10-CM

## 2018-08-28 LAB
HBA1C MFR BLD: 4.9 % (ref 0–5.6)
PROLACTIN SERPL-MCNC: 10 UG/L (ref 3–27)

## 2018-08-28 PROCEDURE — 85307 ASSAY ACTIVATED PROTEIN C: CPT | Performed by: OBSTETRICS & GYNECOLOGY

## 2018-08-28 PROCEDURE — 36415 COLL VENOUS BLD VENIPUNCTURE: CPT | Performed by: OBSTETRICS & GYNECOLOGY

## 2018-08-28 PROCEDURE — 86146 BETA-2 GLYCOPROTEIN ANTIBODY: CPT | Performed by: OBSTETRICS & GYNECOLOGY

## 2018-08-28 PROCEDURE — 83036 HEMOGLOBIN GLYCOSYLATED A1C: CPT | Performed by: OBSTETRICS & GYNECOLOGY

## 2018-08-28 PROCEDURE — 99215 OFFICE O/P EST HI 40 MIN: CPT | Performed by: OBSTETRICS & GYNECOLOGY

## 2018-08-28 PROCEDURE — 84146 ASSAY OF PROLACTIN: CPT | Performed by: OBSTETRICS & GYNECOLOGY

## 2018-08-28 PROCEDURE — 86146 BETA-2 GLYCOPROTEIN ANTIBODY: CPT | Mod: 59 | Performed by: OBSTETRICS & GYNECOLOGY

## 2018-08-28 PROCEDURE — 81241 F5 GENE: CPT | Performed by: OBSTETRICS & GYNECOLOGY

## 2018-08-28 NOTE — MR AVS SNAPSHOT
After Visit Summary   8/28/2018    Melissa Macdonald    MRN: [id number]           Patient Information     Date Of Birth          1990        Visit Information        Provider Department      8/28/2018 9:00 AM Pete Cramer MD [facility]        Today's Diagnoses     History of recurrent miscarriages    -  1      Care Instructions    Call [facility] for lab appointment  [phone]          Follow-ups after your visit        Future tests that were ordered for you today     Open Future Orders        Priority Expected Expires Ordered    Estradiol Routine  8/28/2019 8/28/2018    Follicle stimulating hormone Routine  8/28/2019 8/28/2018            Who to contact     If you have questions or need follow up information about today's clinic visit or your schedule please contact [facility] directly at [phone].  Normal or non-critical lab and imaging results will be communicated to you by MyChart, letter or phone within 4 business days after the clinic has received the results. If you do not hear from us within 7 days, please contact the clinic through [employer] or phone. If you have a critical or abnormal lab result, we will notify you by phone as soon as possible.  Submit refill requests through [employer] or call your pharmacy and they will forward the refill request to us. Please allow 3 business days for your refill to be completed.          Additional Information About Your Visit        MyChart Information     [employer] gives you secure access to your electronic health record. If you see a primary care provider, you can also send messages to your care team and make appointments. If you have questions, please call your primary care clinic.  If you do not have a primary care provider, please call [phone] and they will assist you.        Care EveryWhere ID     This is your Care EveryWhere ID. This could be used by other organizations to access your [place]  medical records  [id number]        Your Vitals Were     Pulse Last Period Pulse Oximetry Breastfeeding? BMI (Body Mass Index)       74 08/18/2018 (Exact Date) 100% No 30.21 kg/m2        Blood Pressure from Last 3 Encounters:   08/28/18 122/82   08/03/18 116/78   06/13/18 110/70    Weight from Last 3 Encounters:   08/28/18 190 lb (86.2 kg)   08/03/18 192 lb (87.1 kg)   06/13/18 190 lb (86.2 kg)              We Performed the Following     Activated protein C resistance     Beta 2 Glycoprotein 1 Antibody IgA     Beta 2 Glycoprotein 1 Antibody IgG     Beta 2 Glycoprotein 1 Antibody IgM     Factor 5 leiden mutation analysis     Hemoglobin A1c     Prolactin        Primary Care Provider Office Phone # Fax #    Kristina Adriane Mccoy [phone] [address] [address] [facility] [address]        Equal Access to Services     DIANA [facility] SystemLATRICIA : Hadii aad ku hadasho Soomaali, waaxda luqadaha, qaybta kaalmada adeegyada, elodia cohenin haymargarette reed . So [facility] [phone].    ATENCIÓN: Si habla español, tiene a alberto disposición servicios gratuitos de asistencia lingüística. Llame al [phone].    We comply with applicable federal civil rights laws and [place] laws. We do not discriminate on the basis of race, color, national origin, age, disability, sex, sexual orientation, or gender identity.            Thank you!     Thank you for choosing [facility]  for your care. Our goal is always to provide you with excellent care. Hearing back from our patients is one way we can continue to improve our services. Please take a few minutes to complete the written survey that you may receive in the mail after your visit with us. Thank you!             Your Updated Medication List - Protect others around you: Learn how to safely use, store and throw away your medicines at www.disposemymeds.org.          This list is accurate as of 8/28/18  9:47 AM.  Always use your most recent med list.                    Brand Name Dispense Instructions for use Diagnosis    doxylamine 25 MG Tabs tablet    UNISOM     Take 25 mg by mouth At Bedtime        FOLIC ACID PO      Take 1 mg by mouth daily        prenatal multivitamin plus iron 27-0.8 MG Tabs per tablet      Take 1 tablet by mouth daily        VITAMIN B6 PO           VITAMIN D (CHOLECALCIFEROL) PO      Take 2,000 Units by mouth 2 times daily

## 2018-08-29 LAB
B2 GLYCOPROT1 IGA SER-ACNC: 1 U/ML
B2 GLYCOPROT1 IGG SERPL IA-ACNC: 0.7 U/ML
B2 GLYCOPROT1 IGM SERPL IA-ACNC: 1.7 U/ML

## 2018-08-30 ENCOUNTER — MYC MEDICAL ADVICE (OUTPATIENT)
Dept: OBGYN | Facility: CLINIC | Age: 28
End: 2018-08-30

## 2018-08-30 LAB — COPATH REPORT: NORMAL

## 2018-08-30 NOTE — TELEPHONE ENCOUNTER
Unsure it patient would have karyotype done at  or [facility]. Unsure when he wants patient to have labs.  Will route to Dr. Cramer for review & orders. Claribel Melara RN, BAN

## 2018-08-31 LAB — APCR PPP: 2.85 {RATIO}

## 2018-09-05 DIAGNOSIS — N96 RECURRENT PREGNANCY LOSS WITHOUT CURRENT PREGNANCY: Primary | ICD-10-CM

## 2018-09-14 ENCOUNTER — HOSPITAL ENCOUNTER (OUTPATIENT)
Dept: LAB | Facility: CLINIC | Age: 28
Discharge: HOME | End: 2018-09-14
Attending: OBSTETRICS & GYNECOLOGY | Admitting: OBSTETRICS & GYNECOLOGY
Payer: COMMERCIAL

## 2018-09-14 DIAGNOSIS — N96 HISTORY OF RECURRENT MISCARRIAGES: ICD-10-CM

## 2018-09-14 LAB
ESTRADIOL SERPL-MCNC: 57 PG/ML
FSH SERPL-ACNC: 6.3 IU/L

## 2018-09-14 PROCEDURE — 83001 ASSAY OF GONADOTROPIN (FSH): CPT | Performed by: OBSTETRICS & GYNECOLOGY

## 2018-09-14 PROCEDURE — 82670 ASSAY OF TOTAL ESTRADIOL: CPT | Performed by: OBSTETRICS & GYNECOLOGY

## 2018-09-14 PROCEDURE — 36415 COLL VENOUS BLD VENIPUNCTURE: CPT | Performed by: OBSTETRICS & GYNECOLOGY

## 2018-09-18 ENCOUNTER — MYC MEDICAL ADVICE (OUTPATIENT)
Dept: OBGYN | Facility: CLINIC | Age: 28
End: 2018-09-18

## 2018-09-18 DIAGNOSIS — Z87.59 HISTORY OF MISCARRIAGE: Primary | ICD-10-CM

## 2018-09-21 NOTE — TELEPHONE ENCOUNTER
I called patient's number and she was not available  I left a message on the phone regarding ovarian reserve and I would not give a diagnosis of impaired ovarian reserve with just one lab evaluation.  In addition, I don't suspect it has much to do with recurrent miscarriages.  I will attempt again.   Pete Cramer MD

## 2018-09-24 ENCOUNTER — PRE VISIT (OUTPATIENT)
Dept: MATERNAL FETAL MEDICINE | Facility: CLINIC | Age: 28
End: 2018-09-24

## 2018-09-25 ENCOUNTER — OFFICE VISIT (OUTPATIENT)
Dept: MATERNAL FETAL MEDICINE | Facility: CLINIC | Age: 28
End: 2018-09-25
Attending: OBSTETRICS & GYNECOLOGY
Payer: COMMERCIAL

## 2018-09-25 DIAGNOSIS — Z31.430 ENCOUNTER OF FEMALE FOR TESTING FOR GENETIC DISEASE CARRIER STATUS FOR PROCREATIVE MANAGEMENT: Primary | ICD-10-CM

## 2018-09-25 DIAGNOSIS — N96 RECURRENT PREGNANCY LOSS WITHOUT CURRENT PREGNANCY: ICD-10-CM

## 2018-09-25 DIAGNOSIS — Z31.69 ENCOUNTER FOR PRECONCEPTION CONSULTATION: Primary | ICD-10-CM

## 2018-09-25 DIAGNOSIS — Z31.430 ENCOUNTER OF FEMALE FOR TESTING FOR GENETIC DISEASE CARRIER STATUS FOR PROCREATIVE MANAGEMENT: ICD-10-CM

## 2018-09-25 DIAGNOSIS — Z87.59 HISTORY OF MISCARRIAGE: ICD-10-CM

## 2018-09-25 PROCEDURE — 36415 COLL VENOUS BLD VENIPUNCTURE: CPT | Performed by: OBSTETRICS & GYNECOLOGY

## 2018-09-25 PROCEDURE — 40000072 ZZH STATISTIC GENETIC COUNSELING, < 16 MIN: Mod: ZF | Performed by: GENETIC COUNSELOR, MS

## 2018-09-25 PROCEDURE — 40000954 ZZHCL STATISTIC COUNSYL FAMILY PREP: Performed by: OBSTETRICS & GYNECOLOGY

## 2018-09-25 NOTE — PROGRESS NOTES
[facility]  Genetic Counseling Consult    Patient: Melissa Macdonald YOB: 1990   Date of Service: 18      Melissa Macdonald was seen with her , Pawan, at [facility] for genetic consultation to discuss the options for screening and testing for fetal chromosome abnormalities.  The indication for genetic counseling is recurrent pregnancy loss.        Impression/Plan:   1.  Melissa had a genetic counseling session and blood draw for expanded carrier screening (ForeSight through [employer]).  Results are expected within 14-21 days, and will be available in [email or web address].  We will contact her at [phone] to discuss the results, and a copy will be forwarded to the office of the referring OB provider. Melissa provided verbal permission to leave detailed results in a voicemail. An authorization to share protected health information was signed by both Melissa and Pawan.     2.  Melissa had an MD consult regarding her history of recurrent miscarriage. Please see the consult note for details.     Pregnancy History:   /Parity:        Melissa irizarry pregnancy history is significant for two first trimester miscarriages. The cause for the miscarriages has not been identified.     Medical History:   Melissa irizarry reported medical history is not expected to impact pregnancy management or risks to fetal development.    Recurrent Pregnancy Loss:   Recurrent miscarriage is defined as three or more clinically recognized consecutive or non-consecutive pregnancy losses occurring prior to fetal viability (<24 weeks). Melissa's personal history is significant for two pregnancy losses in the first trimester.      While she does not meet typical criteria for recurrent pregnancy loss, based on the health provider s professional judgment, evaluation of couples with two miscarriages may be initiated when deemed necessary especially when taking into  account the woman s age and length of time that the couple spent trying to have a successful pregnancy.    We reviewed that at least 10-15% of the recognized pregnancies end in miscarriage, with most losses occurring in the first trimester. The rate of miscarriage less than 8 weeks gestation may be even greater as some women may not recognize that they are pregnant.  The risk for a miscarriage increases with advancing maternal age due to a higher incidence of conceptuses with a chromosomal aneuploidy. The risk may approach 75% in women who are 45 years of age and older.  In about 50% of couples with recurrent pregnancy loss, the etiology remains unknown despite a thorough evaluation and is therefore classified as idiopathic. It is estimated that couples with idiopathic recurrent pregnancy loss can have up to a 75% chance of having a successful pregnancy.    Commonly reported causes of recurrent pregnancy loss include the following:    Endocrine            Uncontrolled diabetes mellitus            Luteal phase deficiency (remains inconclusive, limited to 1st trimester)            Polycystic ovary syndrome  Environmental agents            Prolonged exposure to alcohol, smoking            Immunologic            Antiphospholipid syndrome  Maternal factors (acquired, inherited, structural)            Uterine anatomic malformations            Myomas            Cervical abnormalities  Chromosomal and single gene disorders            Fetal chromosomal abnormalities            Parental balanced translocation            Alpha thalassemia major            Thrombophilia            X-linked male lethal conditions       We discussed the option of chromosome analysis (karyotype) to evaluate for a parental balanced tanslocation. Because Melissa does not meet typical criteria for recurrent pregnancy loss it is unlikely that insurance would cover this testing. Melissa and Pawan opted to hold off on chromosome analysis for now. If they  were to have another miscarriage they are interested in pursuing chromosome analysis at that time.        Family History:   A three-generation pedigree was obtained, and is scanned under the  Media  tab.   The following significant findings were reported by Melissa:    Melissa reports that one of her maternal first cousins has cognitive impairment. She is not aware of any testing to determine the cause of his intellectual disability (ID). Some forms of ID can be associated with specific genetic conditions, while others are due to the combination of genes and environmental factors that can affect development.  Because there is a genetic component to ID, the recurrence risk for other family members is higher among first-degree relatives of the individual with ID (full siblings), and decreases with distance in relationship to other second-degree relatives. We discussed that as the affected individual in the family is a  4th degree relative to the couple's future children the chance of ID is likely not greater than the general population chance.   Pawan reports one of his maternal aunts was diagnosed with breast cancer at age 48. Cancer most often occurs by chance, however some families seem to develop cancer more frequently than expected. Everyone has a risk to develop cancer, but individuals may be at an increased risk to develop cancer based on their family history. Due to the young age at diagnosis, there is an elevated concern for a genetic basis for Pawan's aunt's cancer.  Genetic counseling and testing is available for individuals who would like to proceed.    Pawan reports that he has a maternal first cousin who passed away at 5 months of age from a heart defect. He believes it was a septal defect, but isn't sure whether it was atrial or ventricular. Congenital heart defects can be isolated or associated with multiple birth defects (syndromic).There are many genetic syndromes, single gene disorders or  chromosomal abnormalities, that can be associated with congenital heart defects. Isolated congenital heart defects are usually a multi-factorial condition caused by the combination of genetic and environmental factors and familial clustering is not uncommon. Since there is a genetic component to multi-factorial conditions, the recurrence risk is higher for first degree relatives (siblings) than in second degree relatives and decreases with distance in relationship.    Otherwise, the reported family history is negative for multiple miscarriages, stillbirths, birth defects, mental retardation, known genetic conditions, and consanguinity.       Carrier Screening:   The patient reports that she and Pawan have  ancestry:      Cystic fibrosis is an autosomal recessive genetic condition that occurs with increased frequency in individuals of  ancestry and carrier screening for this condition is available.  In addition,  screening in the [facility] includes cystic fibrosis.      Expanded carrier screening for mutations in a large panel of genes associated with autosomal recessive conditions including cystic fibrosis, spinal muscular atrophy, and others, is now available.      The patient elected to pursue carrier screening today.  Her blood was drawn for ForeSight and sent to [facility].  We will contact her when these test results become available, and a copy of these results will be relayed to her primary OB provider.         It was a pleasure to be involved with Melissa s care. Face-to-face time of the meeting was 50 minutes.    Renée Wiseman [facility]  Certified Genetic Counselor  [phone]    Patient seen, evaluated and discussed with the Genetic Counseling Intern. I have verified the content of the note, which accurately reflects my assessment of the patient and the plan of care.  Supervising Genetic Counselor  Migdalia Parekh MS, [facility]  Maternal Fetal Medicine  [facility]  [facility]  Phone: [phone]  Email: [email or web address]

## 2018-09-25 NOTE — MR AVS SNAPSHOT
After Visit Summary   9/25/2018    Melissa Macdonald    MRN: [id number]           Patient Information     Date Of Birth          1990        Visit Information        Provider Department      9/25/2018 8:45 AM Renée Wiseman [facility] Maternal Fetal Medicine [facility]        Today's Diagnoses     Encounter of female for testing for genetic disease carrier status for procreative management    -  1    Recurrent pregnancy loss without current pregnancy           Follow-ups after your visit        Your next 10 appointments already scheduled     Oct 04, 2018  8:45 AM T   [facility]   [facility]    [address] [address] [place] [id number] [phone]              Who to contact     If you have questions or need follow up information about today's clinic visit or your schedule please contact [facility] directly at [phone].  Normal or non-critical lab and imaging results will be communicated to you by [employer], letter or phone within 4 business days after the clinic has received the results. If you do not hear from us within 7 days, please contact the clinic through [employer] or phone. If you have a critical or abnormal lab result, we will notify you by phone as soon as possible.  Submit refill requests through [employer] or call your pharmacy and they will forward the refill request to us. Please allow 3 business days for your refill to be completed.          Additional Information About Your Visit        [employer] Information     [employer] gives you secure access to your electronic health record. If you see a primary care provider, you can also send messages to your care team and make appointments. If you have questions, please call your primary care clinic.  If you do not have a primary care provider, please call [phone] and they will assist you.        Care EveryWhere  ID     This is your Care EveryWhere ID. This could be used by other organizations to access your [place] medical records  [id number]         Blood Pressure from Last 3 Encounters:   08/28/18 122/82   08/03/18 116/78   06/13/18 110/70    Weight from Last 3 Encounters:   08/28/18 86.2 kg (190 lb)   08/03/18 87.1 kg (192 lb)   06/13/18 86.2 kg (190 lb)              We Performed the Following     [facility] Genetic Counseling        Primary Care Provider Office Phone # Fax #    Kristina Adriane Mccoy [phone] [address] [facility] [address]        Equal Access to Services     LEIGH ANN DOYLE : Hadron beckwitho Socharleen, waaxda tiffany, qaybta kaalmada adekerry, elodia reed . So [facility] [phone].    ATENCIÓN: Si habla español, tiene a alberto disposición servicios gratuitos de asistencia lingüística. Llame al [phone].    We comply with applicable federal civil rights laws and [place] laws. We do not discriminate on the basis of race, color, national origin, age, disability, sex, sexual orientation, or gender identity.            Thank you!     Thank you for choosing [facility]  for your care. Our goal is always to provide you with excellent care. Hearing back from our patients is one way we can continue to improve our services. Please take a few minutes to complete the written survey that you may receive in the mail after your visit with us. Thank you!             Your Updated Medication List - Protect others around you: Learn how to safely use, store and throw away your medicines at www.disposemymeds.org.          This list is accurate as of 9/25/18 11:28 AM.  Always use your most recent med list.                   Brand Name Dispense Instructions for use Diagnosis    doxylamine 25 MG Tabs tablet    UNISOM     Take 25 mg by mouth At Bedtime        FOLIC ACID PO      Take 1 mg by mouth daily        prenatal multivitamin plus  iron 27-0.8 MG Tabs per tablet      Take 1 tablet by mouth daily        VITAMIN B6 PO           VITAMIN D (CHOLECALCIFEROL) PO      Take 2,000 Units by mouth 2 times daily

## 2018-09-25 NOTE — MR AVS SNAPSHOT
After Visit Summary   9/25/2018    Melissa Macdonald    MRN: [id number]           Patient Information     Date Of Birth          1990        Visit Information        Provider Department      9/25/2018 9:30 AM Johanna Reyna, DO [facility] Maternal Fetal Medicine [facility]        Today's Diagnoses     Encounter for preconception consultation    -  1    History of miscarriage           Follow-ups after your visit        Your next 10 appointments already scheduled     Oct 04, 2018  8:45 AM CDT   [facility]   [facility]    [address] [address] [place] [id number] [phone]              Who to contact     If you have questions or need follow up information about today's clinic visit or your schedule please contact [facility] directly at [phone].  Normal or non-critical lab and imaging results will be communicated to you by [employer], letter or phone within 4 business days after the clinic has received the results. If you do not hear from us within 7 days, please contact the clinic through [employer] or phone. If you have a critical or abnormal lab result, we will notify you by phone as soon as possible.  Submit refill requests through [employer] or call your pharmacy and they will forward the refill request to us. Please allow 3 business days for your refill to be completed.          Additional Information About Your Visit        MyChart Information     [employer] gives you secure access to your electronic health record. If you see a primary care provider, you can also send messages to your care team and make appointments. If you have questions, please call your primary care clinic.  If you do not have a primary care provider, please call [phone] and they will assist you.        Care EveryWhere ID     This is your Care EveryWhere ID. This could be used by other  organizations to access your [place] medical records  [id number]         Blood Pressure from Last 3 Encounters:   08/28/18 122/82   08/03/18 116/78   06/13/18 110/70    Weight from Last 3 Encounters:   08/28/18 86.2 kg (190 lb)   08/03/18 87.1 kg (192 lb)   06/13/18 86.2 kg (190 lb)              We Performed the Following     [facility] Office Visit        Primary Care Provider Office Phone # Fax #    Kristina Adriane Mccoy [phone] [address] [facility] [address]        Equal Access to Services     [facility]: Hadii aad emil hadasho Somatteoali, waaxda luqadaha, qaybta kaalmada adesunithayada, elodia reed . So [facility] [phone].    ATENCIÓN: Si habla español, tiene a alberto disposición servicios gratuitos de asistencia lingüística. [facility] [phone].    We comply with applicable federal civil rights laws and [place] laws. We do not discriminate on the basis of race, color, national origin, age, disability, sex, sexual orientation, or gender identity.            Thank you!     Thank you for choosing [facility]  for your care. Our goal is always to provide you with excellent care. Hearing back from our patients is one way we can continue to improve our services. Please take a few minutes to complete the written survey that you may receive in the mail after your visit with us. Thank you!             Your Updated Medication List - Protect others around you: Learn how to safely use, store and throw away your medicines at www.disposemymeds.org.          This list is accurate as of 9/25/18 11:48 AM.  Always use your most recent med list.                   Brand Name Dispense Instructions for use Diagnosis    doxylamine 25 MG Tabs tablet    UNISOM     Take 25 mg by mouth At Bedtime        FOLIC ACID PO      Take 1 mg by mouth daily        prenatal multivitamin plus iron 27-0.8 MG Tabs per tablet      Take 1 tablet by mouth daily         VITAMIN B6 PO           VITAMIN D (CHOLECALCIFEROL) PO      Take 2,000 Units by mouth 2 times daily

## 2018-09-25 NOTE — PROGRESS NOTES
RE: Melissa Macdonald  : 1990  MRUN: [id number]    2018    Dear Dr. Cramer,    Thank you for referring Melissa Macdonald for a Maternal-Fetal Medicine preconeption consultation today regarding her history of two prior misscarriages.       Melissa Macdonald is a 28 year old  who presents for preconception counseling and recommendations regarding her history of two prior miscarriages. Her first loss was in 2018. At that time she had a missed Ab. Pathology was sent and showed decidual and villous tissue consistent with products of conception. It was negative for abnormal trophoblastic proliferation. She had another miscarriage in May - this was noted after an ultrasound at 10 weeks showed a 6 week non living IUP. Products were also sent in that pregnancy and were consistent with degenerated POCs, without evidence of molar pregnancy.    Melissa reports that she and her partner do not have any difficulty getting pregnant. Both she and her partner deny any significant family history of bleeding or clotting disorders, developmental disabilities, or fetal anomalies.  She has already had testing to include, negative APLS labs (neg for Lupus anticoagulant, B2 glycoprotein, and anticardiolipin Abs), negative Factor V Leiden (drawn for a family h/o RA and psoriasis - with patient concern for connection to autoimmune disorder), normal Protein C, normal prolactin and A1c, and normal TSH/free T4 testing.    They were seen by our genetic counselors today and discussed carrier screening, which they plan to move forward with. They are waiting to complete parental Karyotyping and will not do further testing unless a third miscarriage is diagnosed due to insurance coverage issues. She is taking folic acid supplementation currently.     Problems Complicating Pregnancy:  Patient Active Problem List    Diagnosis Date Noted     History of miscarriage 2017     Priority: Medium     Dermatofibroma 2017      Priority: Medium     Persistent depressive disorder with anxious distress, currently mild 2016     Priority: Medium       Obstetrical History:    Obstetric History       T0      L0     SAB2   TAB0   Ectopic0   Multiple0   Live Births0       # Outcome Date GA Lbr Abhishek/2nd Weight Sex Delivery Anes PTL Lv   2 SAB 2018     SAB      2017                Gynecological History:    Menstrual cycles Frequency: regular, every 25-28 days / Duration:  5 days    She denies a history of sexually transmitted infections, dysmenorrhea, ovarian cysts, myomas, endometriosis, or abnormal Pap tests.    Medical History:   Denies except for two prior miscarriages    Surgical History:   Past Surgical History:   Procedure Laterality Date     HC TOOTH EXTRACTION W/FORCEP       Medications:      doxylamine (UNISOM) 25 MG TABS tablet, Take 25 mg by mouth At Bedtime, Disp: , Rfl:      FOLIC ACID PO, Take 1 mg by mouth daily, Disp: , Rfl:      Pyridoxine HCl (VITAMIN B6 PO), , Disp: , Rfl:      VITAMIN D, CHOLECALCIFEROL, PO, Take 2,000 Units by mouth 2 times daily, Disp: , Rfl:      Allergies:   No Known Allergies    Social History:    She  reports that she has never smoked. She has never used smokeless tobacco. She reports that she drinks alcohol. She reports that she does not use illicit drugs. Alcohol 1-3x/week    Employment: [employer]    Family History:   Family History   Problem Relation Age of Onset     Breast Cancer Mother 60     Obesity Mother      Obesity Father      Depression Father      Hypertension Father      Hyperlipidemia Father      Dementia Maternal Grandmother      Arthritis Paternal Grandmother      Coronary Artery Disease Paternal Grandfather      Unknown/Adopted Brother        Ethnicity: Caucasion    She denies a family history of motor/intellectual impairment, stillbirth, genetic or chromosome abnormalities or congenital anomalies.      Partner History:    Ethnicity:  "Caucasion    He denies a family history of motor/intellectual impairment, stillbirth, genetic or chromosome abnormalities or congenital anomalies.   (Does have a nephew who passed at 5 months with \"septal cardiac issue\")    He has no children with other partners.      Review of Systems:    Neg except for as noted in HPI    Data Reviewed:    o ABO Group: A, Rh type: pos, antibody screen: neg  o Hemoglobin 11.6 mg/dL  o Rubella/Varicella IgG: immune  o Hemoglobin A1C: 4.9 %  o Pap smear: NILM (10/16)  o TSH: 0.93 mIU/mL    The remaining prenatal laboratory results are not available for the review during the consultation.    In light of the patient s history as listed above my assessment and recommendations can be summarized as follows:    Miscarriage is the most common complication of early pregnancy.  An estimated 8-20% of clinically recognized pregnancies less than 20 weeks of gestation will miscarry.  80% of miscarriages happen in the first 12 weeks of gestation.  Risk factors for miscarriage include:    Previous miscarriage: the risk of future miscarriage after one miscarriage is 20%, after 2 consecutive miscarriages it is 28% and after three or more consecutive miscarriages it is 43% - We discussed at length today that she still has an over 75% chance of having a healthy future pregnancy, and Melissa wishes to wait and only continue further testing if another miscarriage is diagnosed.     Smoking more than 10 cigarettes per day    Alcohol use at a moderate to high level    Increasing     NSAID use around the time of conception    Chromosomal abnormalities account for approximately 50% of all miscarriages and the earlier the gestational age at miscarriage the higher the incidence.  Autosomal trisomies make up 52% (with trisomy 16 being the most frequent), monosomy X 19%, and polyploidies 22%.   Congenital anomalies are another etiology of miscarriage.  Maternal uterine anomalies, such as a septum can " increase the risk of miscarriage.  Poorly controlled thyroid disease or diabetes increase the risk of miscarriage.  Acute maternal infection can lead to miscarriage.  There are also miscarriages that are unexplained.      Recurrent pregnancy loss (RPL) refers to three or more consecutive losses of clinically recognized pregnancies prior to 20 weeks of gestation.  While approximately 15% of women experience 1 miscarriage, only 2% experience 2 consecutive losses and less than 1% experience 3 consecutive losses.   In addition to the above risk factors for pregnancy loss the antiphospholipid antibody syndrome (she is negative) and parental karyotype abnormalities (as previously discussed) must be considered in women with RPL.    Recommendations:    Parental karyotype in the case of a third miscarriage, this was offered, but declined due to insurance coverage issues.    Evaluation of the uterine cavity with sonohysterogram, hysterosalpingogram, hysteroscopy and/or 3D transvaginal ultrasound.    Testing of future products of conception if another miscarriage is to occur.    Repeat anti-phospholipid antibody testing following any future miscarriages.    We also discussed potential consultation with Reproductive Endocrinology and Infertility.     At the end of our discussion, Ms. Macdonald indicated that her questions were answered and she seemed satisfied with our discussion.  Thank you for the opportunity to participate in your patient s care.  If I can be of any further assistance, please do not hesitate to contact me.    I acted as a scribe for Dr. Reyna during today's visit.    Didi Valladares MD  [facility] Fellow    Attending Note:    The documentation recorded by the scribe accurately reflects the services I personally performed and the decisions made my me.  The patient was seen for an established outpatient visit by me.  The majority of time (>50%) was spent on counseling and coordination of care of this patient and/or  family members.  Total face-to-face time was 30 minutes.      Johanna Reyna, DO  Maternal-Fetal Medicine

## 2018-09-25 NOTE — NURSING NOTE
"Melissa presents to [facility] along with her  today for preconception consult and genetic counseling due to history of miscarriage x2 (see notes/reports)  Melissa reports two early pregnancy losses, one in 12/2017 and one in 6/2018.  Melissa reports she has had some testing and workup done at her primary OB clinic and everything has returned normal.  Melissa and her  are looking for \"next steps\" to take and for any further recommendations.  Dr. Valladares and Dr. Reyna met with patient to discuss recommendations. Melissa and her  met with  and are planning to pursue carrier screening today. Map and directions to lab given and reviewed. No further MFM needs at this time. Patient discharged stable and ambulatory.    Debra Bolanos RN  "

## 2018-10-02 ENCOUNTER — MYC MEDICAL ADVICE (OUTPATIENT)
Dept: OBGYN | Facility: CLINIC | Age: 28
End: 2018-10-02

## 2018-10-02 ENCOUNTER — TELEPHONE (OUTPATIENT)
Dept: MATERNAL FETAL MEDICINE | Facility: CLINIC | Age: 28
End: 2018-10-02

## 2018-10-02 NOTE — TELEPHONE ENCOUNTER
10/2/18    Melissa called the main clinic line this morning and spoke with Migdalia Parekh [facility] regarding her pending carrier screening. Melissa wanted to confirm that the lab had received her and her 's samples and check on the timeline.     I call Melissa back and told her that I confirmed with Lizz (the lab) that both samples were received and are currently processing. Lizz expects to report the results out by 10/12/18. I will call her once they are ready.     Renée Wiseman [facility]  Genetic Counselor  [phone]

## 2018-10-09 ENCOUNTER — TELEPHONE (OUTPATIENT)
Dept: MATERNAL FETAL MEDICINE | Facility: CLINIC | Age: 28
End: 2018-10-09

## 2018-10-09 ENCOUNTER — HOSPITAL ENCOUNTER (OUTPATIENT)
Dept: LAB | Facility: CLINIC | Age: 28
Discharge: HOME | End: 2018-10-09
Attending: OBSTETRICS & GYNECOLOGY | Admitting: OBSTETRICS & GYNECOLOGY
Payer: COMMERCIAL

## 2018-10-09 DIAGNOSIS — Z87.59 HISTORY OF MISCARRIAGE: ICD-10-CM

## 2018-10-09 LAB
ESTRADIOL SERPL-MCNC: 72 PG/ML
FSH SERPL-ACNC: 6.3 IU/L

## 2018-10-09 PROCEDURE — 82670 ASSAY OF TOTAL ESTRADIOL: CPT | Performed by: OBSTETRICS & GYNECOLOGY

## 2018-10-09 PROCEDURE — 36415 COLL VENOUS BLD VENIPUNCTURE: CPT | Performed by: OBSTETRICS & GYNECOLOGY

## 2018-10-09 PROCEDURE — 83001 ASSAY OF GONADOTROPIN (FSH): CPT | Performed by: OBSTETRICS & GYNECOLOGY

## 2018-10-09 NOTE — TELEPHONE ENCOUNTER
I called Melissa today to discuss her and her partner, Pawan's, [facility] Carrier Screen results via [facility]. Melissa and her partner were not found to be carriers for the same genetic conditions, therefore, no immediate risk for an affected fetus was identified through this screening.    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 Pawan was not found to be a carrier for hexosaminidase A deficiency. Due to the inability to completely rule out the possibility of being a carrier through this screening, Melissa and Pawan still have a chance of having a child with hexosaminidase A deficiency. Their chance of having an affected child prior to this screening was 1 in 360,000. This chance is now 1 in 120,000.     Neither Melissa nor her partner were found to be carriers for the remaining 175 conditions for which they were screened.     Melissa provided verbal permission for the results to be released from [facility] to her by e-mail. We discussed that e-mail is not a secure form of communication. Melissa felt comfortable receiving the result report by e-mail. I released the results per patient request.        Renée Wiseman MS, [facility]  Maternal Fetal Medicine  [facility]  Ph: [phone]

## 2018-10-11 ENCOUNTER — OFFICE VISIT (OUTPATIENT)
Dept: OBGYN | Facility: CLINIC | Age: 28
End: 2018-10-11
Payer: COMMERCIAL

## 2018-10-11 VITALS
HEART RATE: 91 BPM | OXYGEN SATURATION: 100 % | SYSTOLIC BLOOD PRESSURE: 113 MMHG | DIASTOLIC BLOOD PRESSURE: 78 MMHG | BODY MASS INDEX: 30.05 KG/M2 | RESPIRATION RATE: 18 BRPM | WEIGHT: 189 LBS

## 2018-10-11 DIAGNOSIS — Z87.59 HISTORY OF MISCARRIAGE: Primary | ICD-10-CM

## 2018-10-11 PROCEDURE — 99214 OFFICE O/P EST MOD 30 MIN: CPT | Performed by: OBSTETRICS & GYNECOLOGY

## 2018-10-11 ASSESSMENT — ANXIETY QUESTIONNAIRES
5. BEING SO RESTLESS THAT IT IS HARD TO SIT STILL: NOT AT ALL
1. FEELING NERVOUS, ANXIOUS, OR ON EDGE: MORE THAN HALF THE DAYS
6. BECOMING EASILY ANNOYED OR IRRITABLE: SEVERAL DAYS
3. WORRYING TOO MUCH ABOUT DIFFERENT THINGS: NEARLY EVERY DAY
GAD7 TOTAL SCORE: 11
7. FEELING AFRAID AS IF SOMETHING AWFUL MIGHT HAPPEN: MORE THAN HALF THE DAYS
IF YOU CHECKED OFF ANY PROBLEMS ON THIS QUESTIONNAIRE, HOW DIFFICULT HAVE THESE PROBLEMS MADE IT FOR YOU TO DO YOUR WORK, TAKE CARE OF THINGS AT HOME, OR GET ALONG WITH OTHER PEOPLE: SOMEWHAT DIFFICULT
2. NOT BEING ABLE TO STOP OR CONTROL WORRYING: MORE THAN HALF THE DAYS

## 2018-10-11 ASSESSMENT — PATIENT HEALTH QUESTIONNAIRE - PHQ9: 5. POOR APPETITE OR OVEREATING: SEVERAL DAYS

## 2018-10-11 NOTE — PROGRESS NOTES
Melissa Macdonald is a 28 year old female, , who presents today for follow up of recurrent miscarriages.  She has had 2 miscarriages in the past year.   She and her  have been seen by the genetics counselors with M.  They decided not to have the chromosome analysis based on possible cost, should insurance not cover the testing (2 SAb verses 3 SAb)  We reviewed information that we have reviewed previously as well. We reviewed that miscarriage occurs ~ 1 in 5 pregnancy events and that there was no direct event or prevention  that the patient could have avoided or performed.  Discussed that there are many etiologies for miscarriage, the most common being a genetic anomaly. The risk for a miscarriage increases with advancing maternal age due to a higher incidence of conceptuses with a chromosomal aneuploidy. The risk may approach 75% in women who are 45 years of age and older.  In about 50% of couples with recurrent pregnancy loss, the etiology remains unknown despite a thorough evaluation and is therefore classified as idiopathic. It is estimated that couples with idiopathic recurrent pregnancy loss can have up to a 75% chance of having a successful pregnancy. Reviewed that risk of miscarriage for next pregnancy is not elevated by the current event.    Commonly reported causes of recurrent pregnancy loss include the following:     Endocrine            Uncontrolled diabetes mellitus            Luteal phase deficiency (remains inconclusive, limited to 1st trimester)            Polycystic ovary syndrome  Environmental agents            Prolonged exposure to alcohol, smoking            Immunologic            Antiphospholipid syndrome  Maternal factors (acquired, inherited, structural)            Uterine anatomic malformations            Myomas            Cervical abnormalities  Chromosomal and single gene disorders            Fetal chromosomal abnormalities            Parental balanced  translocation            Alpha thalassemia major            Thrombophilia            X-linked male lethal conditions           We discussed the carrier screening that was performed with [facility]:    [facility] Carrier Screen results via [facility]. Melissa and her partner were not found to be carriers for the same genetic conditions, therefore, no immediate risk for an affected fetus was identified through this screening.     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 Pawan was not found to be a carrier for hexosaminidase A deficiency. Due to the inability to completely rule out the possibility of being a carrier through this screening, Melissa and Pawan still have a chance of having a child with hexosaminidase A deficiency. Their chance of having an affected child prior to this screening was 1 in 360,000. This chance is now 1 in 120,000.      Neither Melissa nor her partner were found to be carriers for the remaining 175 conditions for which they were screened.    She also had ovarian reserve testing and the following tests results reviewed.     Component      Latest Ref Rng & Units 9/14/2018 10/9/2018   Estradiol      pg/mL 57 72   FSH      IU/L 6.3 6.3       Past Medical History:   Diagnosis Date     History of miscarriage 12/2017    x2       Past Surgical History:   Procedure Laterality Date     HC TOOTH EXTRACTION W/FORCEP          No Known Allergies    Current Outpatient Prescriptions   Medication Sig Dispense Refill     FOLIC ACID PO Take 1 mg by mouth daily       VITAMIN D, CHOLECALCIFEROL, PO Take 2,000 Units by mouth 2 times daily       doxylamine (UNISOM) 25 MG TABS tablet Take 25 mg by mouth At Bedtime       Prenatal Vit-Fe Fumarate-FA (PRENATAL MULTIVITAMIN PLUS IRON) 27-0.8 MG TABS per tablet Take 1 tablet by mouth daily       Pyridoxine HCl (VITAMIN B6 PO)            Social  History     Social History     Marital status:      Spouse name: Chu     Number of children: 0     Years of education: 18     Occupational History           [employer]     Social History Main Topics     Smoking status: Never Smoker     Smokeless tobacco: Never Used     Alcohol use Yes      Comment: Fewer than 2 drinks per week     Drug use: No     Sexual activity: Yes     Partners: Male     Birth control/ protection: None     Other Topics Concern     Parent/Sibling W/ Cabg, Mi Or Angioplasty Before 65f 55m? No     Social History Narrative       Family History   Problem Relation Age of Onset     Breast Cancer Mother 60     Obesity Mother      Obesity Father      Depression Father      Hypertension Father      Hyperlipidemia Father      Dementia Maternal Grandmother      Arthritis Paternal Grandmother      Coronary Artery Disease Paternal Grandfather      Coronary Artery Disease Maternal Grandfather      Obesity Brother      Anxiety Disorder Brother        Review of Systems:  10 point ROS of systems including Constitutional, Eyes, Respiratory, Cardiovascular, Gastroenterology, Genitourinary, Integumentary, Muscularskeletal, Psychiatric were all negative except for pertinent positives noted in my HPI and in the PMH.      Exam  /78 (BP Location: Right arm, Cuff Size: Adult Large)  Pulse 91  Resp 18  Wt 189 lb (85.7 kg)  LMP 10/06/2018  SpO2 100%  Breastfeeding? No  BMI 30.05 kg/m2   PHQ-9=4  VICKY-7=11  General:  WNWD female, NAD  Alert  Oriented x 3  Gait:  Normal  Skin:  Normal skin turgor  HEENT:  NC/AT, EOMI  Neck:  Symmetrical  Lungs:  Good respiratory effort  Pelvic exam:  Not performed  Extremities:  No clubbing, no cyanosis and no edema.      Assessment  History of miscarriage, x 2    Plan  After we discussed and reviewed the above information, she voiced that she is not sure that they will proceed with another pregnancy in the near future due to the life  events the couple has experienced this past year (miscarriage x 2, moving).  She isn't sure when they might be ready to consider the pregnancy again.   We discussed the use of Progesterone statistics to decrease miscarriage risks for future pregnancy.     TT 30-40 min  CT greater than 50%

## 2018-10-12 ENCOUNTER — OFFICE VISIT (OUTPATIENT)
Dept: FAMILY MEDICINE | Facility: CLINIC | Age: 28
End: 2018-10-12
Payer: COMMERCIAL

## 2018-10-12 VITALS
BODY MASS INDEX: 29.41 KG/M2 | DIASTOLIC BLOOD PRESSURE: 67 MMHG | OXYGEN SATURATION: 99 % | SYSTOLIC BLOOD PRESSURE: 103 MMHG | HEART RATE: 58 BPM | RESPIRATION RATE: 18 BRPM | WEIGHT: 185 LBS | TEMPERATURE: 97.8 F

## 2018-10-12 DIAGNOSIS — J31.0 CHRONIC RHINITIS: Primary | ICD-10-CM

## 2018-10-12 DIAGNOSIS — D22.9 MELANOCYTIC NEVUS, UNSPECIFIED LOCATION: ICD-10-CM

## 2018-10-12 PROCEDURE — 99213 OFFICE O/P EST LOW 20 MIN: CPT | Performed by: FAMILY MEDICINE

## 2018-10-12 ASSESSMENT — ANXIETY QUESTIONNAIRES: GAD7 TOTAL SCORE: 11

## 2018-10-12 ASSESSMENT — PATIENT HEALTH QUESTIONNAIRE - PHQ9: SUM OF ALL RESPONSES TO PHQ QUESTIONS 1-9: 4

## 2018-10-12 NOTE — PROGRESS NOTES
"  SUBJECTIVE:   Melissa Macdonald is a 28 year old female who presents to clinic today for the following health issues:    Concerns:  1. Seasonal allergies,  Last week of sept, felt under the weather, drippy nose, stuffed ears, throat, but didn't feel she was ill.   Nasal mucus has always been clear, no sinus pain/pressure, headache, tooth pain or any fever.  Reports \"glassy eyed\" when waking up, feels \"fuzzy-headed\" and tired during the day.  She started loratidine, which does help somewhat, but she still wakes up feeling stuffed up and \"fuzzy.\"  She has not had a h/o allergies.  No cough or ear symptoms currently, no skin rash.        2. Dermatology referral : she has many moles and used to get routine skin checks; needs a new dermatologist, as she has moved.      She still follows with her OB/GYN in [place], where she and her  were living until their recent move.  Both she and her husbands have new jobs.  With the move and new jobs, in addition to miscarriage x2 last year, she has had some stress related to that.  They are thinking of trying again for pregnancy in the spring.  She does not want to try a medication at this point, but is open to learning more about the options.  She has noticed a component of SAD, has not tried a light box.        Problem list and histories reviewed & adjusted, as indicated.  Additional history: as documented    Patient Active Problem List   Diagnosis     Persistent depressive disorder with anxious distress, currently mild     Dermatofibroma     History of miscarriage     Past Surgical History:   Procedure Laterality Date     HC TOOTH EXTRACTION W/FORCEP         Social History   Substance Use Topics     Smoking status: Never Smoker     Smokeless tobacco: Never Used     Alcohol use Yes      Comment: Fewer than 2 drinks per week     Family History   Problem Relation Age of Onset     Breast Cancer Mother 60     Obesity Mother      Obesity Father      Depression Father      " Hypertension Father      Hyperlipidemia Father      Dementia Maternal Grandmother      Arthritis Paternal Grandmother      Coronary Artery Disease Paternal Grandfather      Coronary Artery Disease Maternal Grandfather      Obesity Brother      Anxiety Disorder Brother          Current Outpatient Prescriptions   Medication Sig Dispense Refill     FOLIC ACID PO Take 1 mg by mouth daily       VITAMIN D, CHOLECALCIFEROL, PO Take 2,000 Units by mouth 2 times daily       doxylamine (UNISOM) 25 MG TABS tablet Take 25 mg by mouth At Bedtime       Prenatal Vit-Fe Fumarate-FA (PRENATAL MULTIVITAMIN PLUS IRON) 27-0.8 MG TABS per tablet Take 1 tablet by mouth daily       Pyridoxine HCl (VITAMIN B6 PO)        No Known Allergies    Reviewed and updated as needed this visit by clinical staff  Tobacco  Allergies  Meds  Med Hx  Surg Hx  Fam Hx  Soc Hx      Reviewed and updated as needed this visit by Provider         ROS:  Constitutional, HEENT, cardiovascular, pulmonary, gi and gu systems are negative, except as otherwise noted.    OBJECTIVE:     /67 (BP Location: Left arm, Patient Position: Sitting, Cuff Size: Adult Large)  Pulse 58  Temp 97.8  F (36.6  C) (Oral)  Resp 18  Wt 185 lb (83.9 kg)  LMP 10/06/2018  SpO2 99%  BMI 29.41 kg/m2  Body mass index is 29.41 kg/(m^2).  GENERAL APPEARANCE: healthy, alert and no distress  EYES: Eyes grossly normal to inspection, PERRL and conjunctivae and sclerae normal  HENT: ear canals and TM's normal and nose and mouth without ulcers or lesions  NECK: no adenopathy, no asymmetry, masses, or scars and thyroid normal to palpation  RESP: lungs clear to auscultation - no rales, rhonchi or wheezes  CV: regular rates and rhythm, normal S1 S2, no S3 or S4 and no murmur, click or rub  PSYCH: mentation appears normal and affect normal/bright        ASSESSMENT/PLAN:             1. Chronic rhinitis  Advised to start flonase; possible sinusitis but history and exam not consistent with  this, referral done per her request to allergy specialist  - ALLERGY/ASTHMA ADULT REFERRAL    2. Melanocytic nevus, unspecified location  No particular lesions of concern, she would just like to continue to have routine skin checks due to numerous nevi  - DERMATOLOGY REFERRAL    Some adjustment disorder or situational stress along with possible chronic mood disorder per chart review--went through med options, would be a good candidate for serotonin specific reuptake inhibitor such as zoloft or lexapro, which could be continued if needed during a pregnancy.  She will continue with her therapist for now, will confer with her , who is a pharmacist, and f/u if wishing to discuss medication again.  Could also consider a light box.  Advised continued attention to diet, sleep, exercise.      Cindy Jennings MD  [facility]

## 2018-10-12 NOTE — MR AVS SNAPSHOT
After Visit Summary   10/12/2018    Melissa Macdonald    MRN: [id number]           Patient Information     Date Of Birth          1990        Visit Information        Provider Department      10/12/2018 1:00 PM Cindy Jennings MD [facility]        Today's Diagnoses     Chronic rhinitis    -  1    Melanocytic nevus, unspecified location           Follow-ups after your visit        Additional Services     ALLERGY/ASTHMA ADULT REFERRAL       Your provider has referred you to: [facility] [place] [phone]   [email or web address]  [facility]: [place] [facility] [phone]   [email or web address]  [facility] [phone]   [email or web address]    Please be aware that coverage of these services is subject to the terms and limitations of your health insurance plan.  Call member services at your health plan with any benefit or coverage questions.      Please bring the following with you to your appointment:    (1) Any X-Rays, CTs or MRIs which have been performed.  Contact the facility where they were done to arrange for  prior to your scheduled appointment.    (2) List of current medications  (3) This referral request   (4) Any documents/labs given to you for this referral            DERMATOLOGY REFERRAL       Your provider has referred you to: [facility] [phone]   [email or web address]    Please be aware that coverage of these services is subject to the terms and limitations of your health insurance plan.  Call member services at your health plan with any benefit or coverage questions.      Please bring the following with you to your appointment:    (1) Any X-Rays, CTs or MRIs which have been performed.  Contact the facility where they were done to arrange for  prior to your  scheduled appointment.    (2) List of current medications  (3) This referral request   (4) Any documents/labs given to you for this referral                  Who to contact     If you have questions or need follow up information about today's clinic visit or your schedule please contact [facility] directly at [phone].  Normal or non-critical lab and imaging results will be communicated to you by MyChart, letter or phone within 4 business days after the clinic has received the results. If you do not hear from us within 7 days, please contact the clinic through [employer] or phone. If you have a critical or abnormal lab result, we will notify you by phone as soon as possible.  Submit refill requests through [employer] or call your pharmacy and they will forward the refill request to us. Please allow 3 business days for your refill to be completed.          Additional Information About Your Visit        MyChart Information     [employer] gives you secure access to your electronic health record. If you see a primary care provider, you can also send messages to your care team and make appointments. If you have questions, please call your primary care clinic.  If you do not have a primary care provider, please call [phone] and they will assist you.        Care EveryWhere ID     This is your Care EveryWhere ID. This could be used by other organizations to access your [place] medical records  [id number]        Your Vitals Were     Pulse Temperature Respirations Last Period Pulse Oximetry BMI (Body Mass Index)    58 97.8  F (36.6  C) (Oral) 18 10/06/2018 99% 29.41 kg/m2       Blood Pressure from Last 3 Encounters:   10/12/18 103/67   10/11/18 113/78   08/28/18 122/82    Weight from Last 3 Encounters:   10/12/18 185 lb (83.9 kg)   10/11/18 189 lb (85.7 kg)   08/28/18 190 lb (86.2 kg)              We Performed the Following     ALLERGY/ASTHMA ADULT REFERRAL     DERMATOLOGY REFERRAL        Primary Care  Provider Office Phone # Fax #    Kristina Mccoy [phone] [id number] [facility] [id number]        Equal Access to Services     LEIGH ANN DOYLE : Mary Grace stein dany Socharleen, waaxda luqadaha, qaybta kaalmada quincy, elodia gonzáles marahsunitha rogers laCarlmargarette wahl. So [facility] [phone].    ATENCIÓN: Si habla español, tiene a alberto disposición servicios gratuitos de asistencia lingüística. Llame al [phone].    We comply with applicable federal civil rights laws and [place] laws. We do not discriminate on the basis of race, color, national origin, age, disability, sex, sexual orientation, or gender identity.            Thank you!     Thank you for choosing [facility]  for your care. Our goal is always to provide you with excellent care. Hearing back from our patients is one way we can continue to improve our services. Please take a few minutes to complete the written survey that you may receive in the mail after your visit with us. Thank you!             Your Updated Medication List - Protect others around you: Learn how to safely use, store and throw away your medicines at www.disposemymeds.org.          This list is accurate as of 10/12/18  1:32 PM.  Always use your most recent med list.                   Brand Name Dispense Instructions for use Diagnosis    doxylamine 25 MG Tabs tablet    UNISOM     Take 25 mg by mouth At Bedtime        FOLIC ACID PO      Take 1 mg by mouth daily        prenatal multivitamin plus iron 27-0.8 MG Tabs per tablet      Take 1 tablet by mouth daily        VITAMIN B6 PO           VITAMIN D (CHOLECALCIFEROL) PO      Take 2,000 Units by mouth 2 times daily

## 2018-10-28 ENCOUNTER — MYC MEDICAL ADVICE (OUTPATIENT)
Dept: OBGYN | Facility: CLINIC | Age: 28
End: 2018-10-28

## 2018-10-28 DIAGNOSIS — Z30.011 ENCOUNTER FOR ORAL CONTRACEPTION INITIAL PRESCRIPTION: Primary | ICD-10-CM

## 2018-10-29 RX ORDER — NORGESTIMATE AND ETHINYL ESTRADIOL 7DAYSX3 LO
1 KIT ORAL DAILY
Qty: 84 TABLET | Refills: 3 | Status: SHIPPED | OUTPATIENT
Start: 2018-10-29 | End: 2019-04-18

## 2019-01-14 ENCOUNTER — TELEPHONE (OUTPATIENT)
Dept: OBGYN | Facility: CLINIC | Age: 29
End: 2019-01-14

## 2019-01-14 NOTE — TELEPHONE ENCOUNTER
"Received phone call from pt.  Pt c/o norgestim-eth estrad triphasic (ORTHO TRI-CYCLEN LO)  Break through bleeding on the 3rd week of medication.  Does not saturated a pad, \"just annoying\".  Pt informed she could experience breakthrough bleeding for 3 months from the start of the medication. Pt started on medication on 10/29/18. Pt encouraged to monitor for one more month and re evaluate at that time.  Pt verbalized understanding and agrees with plan of care. Elan Madrid RN on 1/14/2019 at 10:30 AM    "

## 2019-01-28 ENCOUNTER — TELEPHONE (OUTPATIENT)
Dept: FAMILY MEDICINE | Facility: CLINIC | Age: 29
End: 2019-01-28

## 2019-01-28 NOTE — TELEPHONE ENCOUNTER
S-(situation): patient calling to see what she should do as she is worried she may have frostbite     B-(background): layered for shoveling, legs not layered well, started with tingling in legs, where her coat/boots were not covering   Came in the house sat in bathtub red/white spots on legs, have now resolved     A-(assessment): patient is no longer having any skin color changes or tingling in legs     R-(recommendations): stay in the house where it is warm - monitor skin for color changes/blisters / tingling - if this occurs call back to clinic triage for recommendations/appointment     Radha Tran, Registered Nurse   [facility]

## 2019-02-22 ENCOUNTER — OFFICE VISIT (OUTPATIENT)
Dept: OBGYN | Facility: CLINIC | Age: 29
End: 2019-02-22
Payer: COMMERCIAL

## 2019-02-22 VITALS
OXYGEN SATURATION: 98 % | DIASTOLIC BLOOD PRESSURE: 82 MMHG | WEIGHT: 193 LBS | SYSTOLIC BLOOD PRESSURE: 129 MMHG | HEART RATE: 94 BPM | BODY MASS INDEX: 30.68 KG/M2

## 2019-02-22 DIAGNOSIS — N92.1 BREAKTHROUGH BLEEDING ON OCPS: Primary | ICD-10-CM

## 2019-02-22 PROCEDURE — 99214 OFFICE O/P EST MOD 30 MIN: CPT | Performed by: OBSTETRICS & GYNECOLOGY

## 2019-02-22 RX ORDER — LORATADINE 10 MG/1
10 TABLET ORAL DAILY
COMMUNITY
End: 2019-04-17

## 2019-02-22 NOTE — PROGRESS NOTES
"Melissa Macdonald is a 28 year old female, .  She is on OCPs for contraception.  She has had 2 prior pregnancies that have been miscarriages.  She is deciding whether she is interested in another pregnancy.  She voices that she is \"in a better place\" this year, than previously.  She feels that if they were to get pregnant, their approach to the pregnancy has improved.  She is not using the folic acid yet.  I would suggest to use the folic acid as the OCPs are not 100%.    She has also had breakthrough bleeding with the OCPs.  She has been on this particular pill for the past 4 months and the BTB has continued.  The BTB is occurring about a week before the inactive pills so she is having about 2 weeks of bleeding total.  She has researched it and she thinks it is an issue with the progestin.  We reviewed BTB on OCPs and in particular, we reviewed the BTB due to the very low estrogen in the formulation she is using.    She states the BTB hasn't been too much, although she has had some small clots, about the size of her thumbnail.  This has been for only a few times.      Past Medical History:   Diagnosis Date     History of miscarriage 12/2017    x2       Past Surgical History:   Procedure Laterality Date     HC TOOTH EXTRACTION W/FORCEP          No Known Allergies    Current Outpatient Medications   Medication Sig Dispense Refill     loratadine (CLARITIN) 10 MG tablet Take 10 mg by mouth daily       norgestim-eth estrad triphasic (ORTHO TRI-CYCLEN LO) 0.18/0.215/0.25 MG-25 MCG per tablet Take 1 tablet by mouth daily 84 tablet 3     VITAMIN D, CHOLECALCIFEROL, PO Take 2,000 Units by mouth 2 times daily       doxylamine (UNISOM) 25 MG TABS tablet Take 25 mg by mouth At Bedtime       FOLIC ACID PO Take 1 mg by mouth daily       Prenatal Vit-Fe Fumarate-FA (PRENATAL MULTIVITAMIN PLUS IRON) 27-0.8 MG TABS per tablet Take 1 tablet by mouth daily       Pyridoxine HCl (VITAMIN B6 PO)          Social History "     Socioeconomic History     Marital status:      Spouse name: Chu     Number of children: 0     Years of education: 18     Highest education level: Not on file   Occupational History     Occupation:      Comment: [place] [employer]   Social Needs     Financial resource strain: Not on file     Food insecurity:     Worry: Not on file     Inability: Not on file     Transportation needs:     Medical: Not on file     Non-medical: Not on file   Tobacco Use     Smoking status: Never Smoker     Smokeless tobacco: Never Used   Substance and Sexual Activity     Alcohol use: Yes     Comment: Fewer than 2 drinks per week     Drug use: No     Sexual activity: Yes     Partners: Male     Birth control/protection: None   Lifestyle     Physical activity:     Days per week: Not on file     Minutes per session: Not on file     Stress: Not on file   Relationships     Social connections:     Talks on phone: Not on file     Gets together: Not on file     Attends Zoroastrianism service: Not on file     Active member of club or organization: Not on file     Attends meetings of clubs or organizations: Not on file     Relationship status: Not on file     Intimate partner violence:     Fear of current or ex partner: Not on file     Emotionally abused: Not on file     Physically abused: Not on file     Forced sexual activity: Not on file   Other Topics Concern     Parent/sibling w/ CABG, MI or angioplasty before 65F 55M? No   Social History Narrative     Not on file       Family History   Problem Relation Age of Onset     Breast Cancer Mother 60     Obesity Mother      Obesity Father      Depression Father      Hypertension Father      Hyperlipidemia Father      Dementia Maternal Grandmother      Arthritis Paternal Grandmother      Coronary Artery Disease Paternal Grandfather      Coronary Artery Disease Maternal Grandfather      Obesity Brother      Anxiety Disorder Brother        Review of Systems:  10 point  ROS of systems including Constitutional, Eyes, Respiratory, Cardiovascular, Gastroenterology, Genitourinary, Integumentary, Muscularskeletal, Psychiatric were all negative except for pertinent positives noted in my HPI and in the PMH.      Exam  /82 (BP Location: Right arm, Cuff Size: Adult Large)   Pulse 94   Wt 87.5 kg (193 lb)   LMP 02/11/2019   SpO2 98%   BMI 30.68 kg/m    General:  WNWD female, NAD  Alert  Oriented x 3  Gait:  Normal  Skin:  Normal skin turgor  HEENT:  NC/AT, EOMI  Abdomen:  Non-tender, non-distended.  Pelvic exam:  Not performed  Extremities:  No clubbing, no cyanosis and no edema.      Assessment  Breakthrough bleeding with OCPs.   Pre-pregnancy discussion.        Plan  She does not want to consider pregnancy before April.  She will be going on vacation then.   I recommend to restart the PNV/folic acid supplementation to help reduce risks for neural tube defects.    We discussed the triphasic low estrogen and the BTB is likely related to the lesser amount of the estrogen.    We discussed BTB management options. At this time she is not interested in supplemental estrogen or in changing the pills (ingredient or dosing).  She will continue with the status quo as she would likely be going off the medications (to attempt pregnancy) at the time the new OCPs or supplementation might be correcting the breakthrough bleeding.   She and her  are working through and trying to decide if another attempt of pregnancy is to be made.   Questions seem to be answered  TT 35 min  CT and review of records and discussion in HPI and in the Plan, greater than 70%  Pete Cramer MD

## 2019-03-19 ENCOUNTER — MYC MEDICAL ADVICE (OUTPATIENT)
Dept: FAMILY MEDICINE | Facility: CLINIC | Age: 29
End: 2019-03-19

## 2019-03-19 ENCOUNTER — E-VISIT (OUTPATIENT)
Dept: FAMILY MEDICINE | Facility: CLINIC | Age: 29
End: 2019-03-19
Payer: COMMERCIAL

## 2019-03-19 DIAGNOSIS — N30.00 ACUTE CYSTITIS WITHOUT HEMATURIA: Primary | ICD-10-CM

## 2019-03-19 PROCEDURE — 99444 ZZC PHYSICIAN ONLINE EVALUATION & MANAGEMENT SERVICE: CPT | Performed by: FAMILY MEDICINE

## 2019-03-20 RX ORDER — NITROFURANTOIN 25; 75 MG/1; MG/1
100 CAPSULE ORAL 2 TIMES DAILY
Qty: 10 CAPSULE | Refills: 1 | Status: SHIPPED | OUTPATIENT
Start: 2019-03-20 | End: 2019-04-17

## 2019-04-17 ENCOUNTER — OFFICE VISIT (OUTPATIENT)
Dept: FAMILY MEDICINE | Facility: CLINIC | Age: 29
End: 2019-04-17
Payer: COMMERCIAL

## 2019-04-17 VITALS
WEIGHT: 193 LBS | RESPIRATION RATE: 18 BRPM | TEMPERATURE: 98.4 F | BODY MASS INDEX: 32.15 KG/M2 | HEIGHT: 65 IN | SYSTOLIC BLOOD PRESSURE: 110 MMHG | DIASTOLIC BLOOD PRESSURE: 76 MMHG | HEART RATE: 57 BPM | OXYGEN SATURATION: 99 %

## 2019-04-17 DIAGNOSIS — Z30.011 ENCOUNTER FOR ORAL CONTRACEPTION INITIAL PRESCRIPTION: ICD-10-CM

## 2019-04-17 DIAGNOSIS — N30.00 ACUTE CYSTITIS WITHOUT HEMATURIA: ICD-10-CM

## 2019-04-17 DIAGNOSIS — Z00.00 WELL ADULT EXAM: Primary | ICD-10-CM

## 2019-04-17 DIAGNOSIS — J30.89 NON-SEASONAL ALLERGIC RHINITIS DUE TO OTHER ALLERGIC TRIGGER: ICD-10-CM

## 2019-04-17 PROCEDURE — 99395 PREV VISIT EST AGE 18-39: CPT | Performed by: FAMILY MEDICINE

## 2019-04-17 RX ORDER — NITROFURANTOIN 25; 75 MG/1; MG/1
100 CAPSULE ORAL 2 TIMES DAILY
Qty: 10 CAPSULE | Refills: 0 | Status: SHIPPED | OUTPATIENT
Start: 2019-04-17 | End: 2019-08-07

## 2019-04-17 ASSESSMENT — ENCOUNTER SYMPTOMS
SORE THROAT: 0
FREQUENCY: 0
ARTHRALGIAS: 0
FEVER: 0
ABDOMINAL PAIN: 0
JOINT SWELLING: 0
NAUSEA: 0
SHORTNESS OF BREATH: 0
DIARRHEA: 0
CHILLS: 0
HEMATURIA: 0
CONSTIPATION: 0
PARESTHESIAS: 0
MYALGIAS: 0
COUGH: 0
HEADACHES: 0
BREAST MASS: 0
DIZZINESS: 0
HEMATOCHEZIA: 0
PALPITATIONS: 0
WEAKNESS: 0
DYSURIA: 0
EYE PAIN: 0
HEARTBURN: 0
NERVOUS/ANXIOUS: 0

## 2019-04-17 ASSESSMENT — MIFFLIN-ST. JEOR: SCORE: 1605.28

## 2019-04-17 NOTE — PROGRESS NOTES
SUBJECTIVE:   CC: Melissa Macdonald is an 28 year old woman who presents for preventive health visit.     Healthy Habits:     Getting at least 3 servings of Calcium per day:  Yes    Bi-annual eye exam:  Yes    Dental care twice a year:  Yes    Sleep apnea or symptoms of sleep apnea:  None    Diet:  Regular (no restrictions)    Frequency of exercise:  2-3 days/week    Duration of exercise:  15-30 minutes    Taking medications regularly:  Yes    Medication side effects:  Not applicable    PHQ-2 Total Score: 0    Additional concerns today:  Yes (Allergies, travel antibiotics, and breast exam question)    Tried claritin for allery symptoms symptoms but did not help much.  Nasal congestion, worse when she wakes up, then seems to clear up during the day.  She did see an allergist and was found to have mod and dust mite allergies.        Travel to [place]--would like to have antibiotic for UTI just in case.      CV: no early CAD in the family.  She does not smoke.    Malignancy: her mother has had breast cancer at 55; no other family members affected.  No colon cancer in the family.  Her pap is due in 6 months; she will follow up with her gynecologist for that.  Bone health: no risk factors  Immunizations: tdap done 2012  Sexual health: doing well on OCPs; not planning on trying for another pregnancy at this time.  Periods are regular.  She is , no concern for STI.  No vaginal itching, odor or discharge.  Depression screen:  Today's PHQ-2 Score:   PHQ-2 ( 1999 Pfizer) 4/17/2019   Q1: Little interest or pleasure in doing things 0   Q2: Feeling down, depressed or hopeless 0   PHQ-2 Score 0   Q1: Little interest or pleasure in doing things Not at all   Q2: Feeling down, depressed or hopeless Not at all   PHQ-2 Score 0       Abuse: Current or Past(Physical, Sexual or Emotional)- No  Do you feel safe in your environment? Yes    Social History     Tobacco Use     Smoking status: Never Smoker     Smokeless tobacco:  Never Used   Substance Use Topics     Alcohol use: Yes     Comment: Fewer than 2 drinks per week     If you drink alcohol do you typically have >3 drinks per day or >7 drinks per week? No    Alcohol Use 4/17/2019   Prescreen: >3 drinks/day or >7 drinks/week? No   Prescreen: >3 drinks/day or >7 drinks/week? -   No flowsheet data found.    Reviewed orders with patient.  Reviewed health maintenance and updated orders accordingly - Yes  Patient Active Problem List   Diagnosis     Persistent depressive disorder with anxious distress, currently mild     Dermatofibroma     History of miscarriage     Past Surgical History:   Procedure Laterality Date     HC TOOTH EXTRACTION W/FORCEP         Social History     Tobacco Use     Smoking status: Never Smoker     Smokeless tobacco: Never Used   Substance Use Topics     Alcohol use: Yes     Comment: Fewer than 2 drinks per week     Family History   Problem Relation Age of Onset     Breast Cancer Mother 55     Obesity Mother      Obesity Father      Depression Father      Hypertension Father      Hyperlipidemia Father      Dementia Maternal Grandmother      Arthritis Paternal Grandmother      Coronary Artery Disease Paternal Grandfather      Coronary Artery Disease Maternal Grandfather      Obesity Brother      Anxiety Disorder Brother          Current Outpatient Medications   Medication Sig Dispense Refill     nitroFURantoin macrocrystal-monohydrate (MACROBID) 100 MG capsule Take 1 capsule (100 mg) by mouth 2 times daily 10 capsule 0     norgestim-eth estrad triphasic (ORTHO TRI-CYCLEN LO) 0.18/0.215/0.25 MG-25 MCG tablet Take 1 tablet by mouth daily 84 tablet 3     VITAMIN D, CHOLECALCIFEROL, PO Take 2,000 Units by mouth 2 times daily       FOLIC ACID PO Take 1 mg by mouth daily       Prenatal Vit-Fe Fumarate-FA (PRENATAL MULTIVITAMIN PLUS IRON) 27-0.8 MG TABS per tablet Take 1 tablet by mouth daily       No Known Allergies    Mammogram not appropriate for this patient based on  "age.    Pertinent mammograms are reviewed under the imaging tab.  History of abnormal Pap smear: NO - age 21-29 PAP every 3 years recommended  PAP / HPV 10/31/2016 9/22/2014   PAP NIL NIL     Reviewed and updated as needed this visit by clinical staff  Tobacco  Allergies  Meds  Med Hx  Surg Hx  Fam Hx  Soc Hx        Reviewed and updated as needed this visit by Provider            Review of Systems   Constitutional: Negative for chills and fever.   HENT: Positive for congestion. Negative for ear pain, hearing loss and sore throat.    Eyes: Negative for pain and visual disturbance.   Respiratory: Negative for cough and shortness of breath.    Cardiovascular: Negative for chest pain, palpitations and peripheral edema.   Gastrointestinal: Negative for abdominal pain, constipation, diarrhea, heartburn, hematochezia and nausea.   Breasts:  Negative for tenderness, breast mass and discharge.   Genitourinary: Negative for dysuria, frequency, genital sores, hematuria, pelvic pain, urgency, vaginal bleeding and vaginal discharge.   Musculoskeletal: Negative for arthralgias, joint swelling and myalgias.   Skin: Negative for rash.   Neurological: Negative for dizziness, weakness, headaches and paresthesias.   Psychiatric/Behavioral: Negative for mood changes. The patient is not nervous/anxious.           OBJECTIVE:   /76 (BP Location: Right arm, Patient Position: Sitting, Cuff Size: Adult Large)   Pulse 57   Temp 98.4  F (36.9  C) (Oral)   Resp 18   Ht 1.657 m (5' 5.25\")   Wt 87.5 kg (193 lb)   LMP 04/14/2019 (Exact Date)   SpO2 99%   BMI 31.87 kg/m    Physical Exam  GENERAL: healthy, alert and no distress  EYES: Eyes grossly normal to inspection, PERRL and conjunctivae and sclerae normal  HENT: ear canals and TM's normal, nose and mouth without ulcers or lesions, no current nasal discharge.  NECK: no adenopathy, no asymmetry, masses, or scars and thyroid normal to palpation  RESP: lungs clear to " "auscultation - no rales, rhonchi or wheezes  BREAST: normal without masses, tenderness or nipple discharge and no palpable axillary masses or adenopathy  CV: regular rate and rhythm, normal S1 S2, no S3 or S4, no murmur, click or rub, no peripheral edema and peripheral pulses strong  ABDOMEN: soft, nontender, no hepatosplenomegaly, no masses and bowel sounds normal  MS: no gross musculoskeletal defects noted, no edema  SKIN: no suspicious lesions or rashes  NEURO: Normal strength and tone, mentation intact and speech normal  PSYCH: mentation appears normal, affect normal/bright    Diagnostic Test Results:  none     ASSESSMENT/PLAN:   1. Well adult exam  CV: no risk factors  Malignancy: pap this October.  Mammogram at 39.  Bone health: no risk factors  Immunizations: up to date  Sexual health: continue on OCPs    2. Acute cystitis without hematuria  rx done to take on her trip  - nitroFURantoin macrocrystal-monohydrate (MACROBID) 100 MG capsule; Take 1 capsule (100 mg) by mouth 2 times daily  Dispense: 10 capsule; Refill: 0    3. Encounter for oral contraception initial prescription    - norgestim-eth estrad triphasic (ORTHO TRI-CYCLEN LO) 0.18/0.215/0.25 MG-25 MCG tablet; Take 1 tablet by mouth daily  Dispense: 84 tablet; Refill: 3    4. Non-seasonal allergic rhinitis due to other allergic trigger  Recommended over the counter flonase or nasacort.        COUNSELING:  Reviewed preventive health counseling, as reflected in patient instructions    BP Readings from Last 1 Encounters:   04/17/19 110/76     Estimated body mass index is 31.87 kg/m  as calculated from the following:    Height as of this encounter: 1.657 m (5' 5.25\").    Weight as of this encounter: 87.5 kg (193 lb).           reports that she has never smoked. She has never used smokeless tobacco.      Counseling Resources:  ATP IV Guidelines  Pooled Cohorts Equation Calculator  Breast Cancer Risk Calculator  FRAX Risk Assessment  ICSI Preventive " Guidelines  Dietary Guidelines for Americans, 2010  USDA's MyPlate  ASA Prophylaxis  Lung CA Screening    Cindy Jennings MD  [facility]

## 2019-04-18 RX ORDER — NORGESTIMATE AND ETHINYL ESTRADIOL 7DAYSX3 LO
1 KIT ORAL DAILY
Qty: 84 TABLET | Refills: 3 | Status: SHIPPED | OUTPATIENT
Start: 2019-04-18 | End: 2020-01-23

## 2019-05-03 ENCOUNTER — OFFICE VISIT (OUTPATIENT)
Dept: FAMILY MEDICINE | Facility: CLINIC | Age: 29
End: 2019-05-03
Payer: COMMERCIAL

## 2019-05-03 ENCOUNTER — TELEPHONE (OUTPATIENT)
Dept: FAMILY MEDICINE | Facility: CLINIC | Age: 29
End: 2019-05-03

## 2019-05-03 VITALS
BODY MASS INDEX: 31.34 KG/M2 | SYSTOLIC BLOOD PRESSURE: 102 MMHG | TEMPERATURE: 98.4 F | WEIGHT: 195 LBS | DIASTOLIC BLOOD PRESSURE: 69 MMHG | HEIGHT: 66 IN | RESPIRATION RATE: 16 BRPM | OXYGEN SATURATION: 98 % | HEART RATE: 66 BPM

## 2019-05-03 DIAGNOSIS — R10.84 ABDOMINAL PAIN, GENERALIZED: Primary | ICD-10-CM

## 2019-05-03 DIAGNOSIS — Z87.19 HISTORY OF GASTROESOPHAGEAL REFLUX (GERD): ICD-10-CM

## 2019-05-03 PROCEDURE — 99214 OFFICE O/P EST MOD 30 MIN: CPT | Performed by: NURSE PRACTITIONER

## 2019-05-03 ASSESSMENT — MIFFLIN-ST. JEOR: SCORE: 1630.23

## 2019-05-03 NOTE — PROGRESS NOTES
"  SUBJECTIVE:   Melissa Macdonald is a 28 year old female who presents to clinic today for the following   health issues:    Chief Complaint   Patient presents with     Abdominal Pain     3x days     Frp, troage mpte\"  S-(situation): Pt not sure when the pain started but some nights \"My gut feels weird when I go to bed\" Three days ago a work got cramps in \"gut\". Yesterday at work and was having pain \"in stomach area, comes and goes , cramps of pain\" Last night 3am woke up with pain. Pain is upper abd, in the center. Pain not constant. Rates pain on 1-10 scale as 4-5 \"manageable\". Loose stools or diarrhea for the past 3 days . No temp. No blood or mucous in stool     B-(background): in college took omeprazole but sx then more burning     A-(assessment): needs appt but not emergent     R-(recommendations): appt given this am     Jihan Martinez RN, BSN          Mid upper abdomen intermitent \"pangs\" of pain.  She had Pain at 0300 and 0600 that lasted a few minutes and improved with movement and repositioning.  The pain is worse when she lays down.  She feels the pain feels similar to when she had indigestion, but not totally similar to her indigestion symptoms.  Intermittent intestinal cramping.  Looser stools, but no diarrhea.  No fever, chills, night sweats.    History of heartburn, but this feels different.    Family history:  Brother has food allergies.    Today she is in clinic to make sure she is not missing something, and to see if there is anything that she can do to make things better.    Reviewed  and updated as needed this visit by clinical staff  Tobacco  Allergies  Meds  Med Hx  Surg Hx  Fam Hx  Soc Hx        Reviewed and updated as needed this visit by Provider         Patient Active Problem List   Diagnosis     Persistent depressive disorder with anxious distress, currently mild     Dermatofibroma     History of miscarriage     Past Surgical History:   Procedure Laterality Date     HC TOOTH " EXTRACTION W/FORCEP         Social History     Tobacco Use     Smoking status: Never Smoker     Smokeless tobacco: Never Used   Substance Use Topics     Alcohol use: Yes     Comment: Fewer than 2 drinks per week     Family History   Problem Relation Age of Onset     Breast Cancer Mother 55     Obesity Mother      Obesity Father      Depression Father      Hypertension Father      Hyperlipidemia Father      Dementia Maternal Grandmother      Arthritis Paternal Grandmother      Coronary Artery Disease Paternal Grandfather      Coronary Artery Disease Maternal Grandfather      Obesity Brother      Anxiety Disorder Brother          Current Outpatient Medications   Medication Sig Dispense Refill     FOLIC ACID PO Take 1 mg by mouth daily       norgestim-eth estrad triphasic (ORTHO TRI-CYCLEN LO) 0.18/0.215/0.25 MG-25 MCG tablet Take 1 tablet by mouth daily 84 tablet 3     Prenatal Vit-Fe Fumarate-FA (PRENATAL MULTIVITAMIN PLUS IRON) 27-0.8 MG TABS per tablet Take 1 tablet by mouth daily       VITAMIN D, CHOLECALCIFEROL, PO Take 2,000 Units by mouth 2 times daily       nitroFURantoin macrocrystal-monohydrate (MACROBID) 100 MG capsule Take 1 capsule (100 mg) by mouth 2 times daily (Patient not taking: Reported on 5/3/2019) 10 capsule 0     Allergies   Allergen Reactions     Dust Mites      Mold      Recent Labs   Lab Test 08/28/18  0948 08/03/18  1418 09/18/14 06/28/11   A1C 4.9  --  4.90  --    LDL  --   --   --  89   HDL  --   --   --  70   TRIG  --   --   --  52   TSH  --  0.93  --   --       BP Readings from Last 3 Encounters:   05/03/19 102/69   04/17/19 110/76   02/22/19 129/82    Wt Readings from Last 3 Encounters:   05/03/19 88.5 kg (195 lb)   04/17/19 87.5 kg (193 lb)   02/22/19 87.5 kg (193 lb)               Labs reviewed in EPIC    ROS:  Constitutional, HEENT, cardiovascular, pulmonary, GI, , musculoskeletal, neuro, skin, endocrine and psych systems are negative, except as otherwise noted.    OBJECTIVE:     BP  "102/69 (BP Location: Right arm, Patient Position: Sitting, Cuff Size: Adult Large)   Pulse 66   Temp 98.4  F (36.9  C) (Oral)   Resp 16   Ht 1.683 m (5' 6.25\")   Wt 88.5 kg (195 lb)   LMP 04/14/2019 (Exact Date)   SpO2 98%   Breastfeeding? No   BMI 31.24 kg/m    Body mass index is 31.24 kg/m .    GENERAL: healthy, alert and no distress  EYES: Eyes grossly normal to inspection, PERRL and conjunctivae and sclerae normal  NECK: no adenopathy and thyroid normal to palpation  RESP: lungs clear to auscultation - no rales, rhonchi or wheezes  CV: regular rate and rhythm, normal S1 S2, no S3 or S4, no murmur, click or rub, no peripheral edema and peripheral pulses strong  ABDOMEN: soft, nontender, no hepatosplenomegaly, no masses and bowel sounds normal. No rebound or guarding.   MS: no gross musculoskeletal defects noted, no edema. Ambulatory with a steady gait.   SKIN: warm and dry  NEURO: Normal strength and tone, mentation intact and speech normal  PSYCH: mentation appears normal, affect normal/bright      ASSESSMENT/PLAN:     (R10.84) Abdominal pain, generalized  (primary encounter diagnosis)  Comment: Uncertain.  Differential diagnosis: Viral gastroenteritis, early gallbladder disease, food allergies or intolerances, GERD  Plan: omeprazole (PRILOSEC) 20 MG DR capsule        I had a long discussion with the patient about her pain, I reviewed the above differential diagnosis, and we talked about treatment.  For now, I encouraged her to \" keep it simple.\"  She has no fever or red flag symptoms for concerning abdominal issues.  I encouraged her to eat a bland diet, drink plenty of fluids, and take care of herself.  She will monitor and watch for new or concerning symptoms.  I did talk to her about food allergies but since this is such a short term problem for her, my concern that this is a food allergy is low.  We discussed and considered lab test today (CBC to check for infection) as well as I talked about " consideration of an ultrasound.  Since she is not having red flag symptoms again, we decided to wait on these for today.  I did encourage her to take omeprazole once a day for the next 2 to 4 weeks.  I expect that he in a bland diet will be helpful and I talked to her about gradually re-implementing her normal diet.  If her symptoms do not completely resolve or with any worsening, she is to be re-seen or let us know.      (Z87.19) History of gastroesophageal reflux (GERD)  Comment:   Plan: omeprazole (PRILOSEC) 20 MG DR capsule          I spent a total of 30 min with the patient, >50% time spent face to face counseling regarding the above issues, establishing a plan of care, and coordinating follow up care.     SUSANA Kim [facility]

## 2019-05-03 NOTE — TELEPHONE ENCOUNTER
Stomach pain for the last 2-3 days and patient would like recommendations on what to do. Call transferred to the emergent line, JEB Rush X  [place]

## 2019-05-03 NOTE — PATIENT INSTRUCTIONS
Patient Education     Abdominal Pain    Abdominal pain is pain in the stomach or belly area. Everyone has this pain from time to time. In many cases it goes away on its own. But abdominal pain can sometimes be due to a serious problem, such as appendicitis. So it s important to know when to seek help.  Causes of abdominal pain  There are many possible causes of abdominal pain. Common causes in adults include:    Constipation, diarrhea, or gas    Stomach acid flowing back up into the esophagus (acid reflux or heartburn)    Severe acid reflux, called GERD (gastroesophageal reflux disease)    A sore in the lining of the stomach or small intestine (peptic ulcer)    Inflammation of the gallbladder, liver, or pancreas    Gallstones or kidney stones    Appendicitis     Intestinal blockage     An internal organ pushing through a muscle or other tissue (hernia)    Urinary tract infections    In women, menstrual cramps, fibroids, or endometriosis    Inflammation or infection of the intestines  Diagnosing the cause of abdominal pain  Your healthcare provider will do a physical exam help find the cause of your pain. If needed, tests will be ordered. Belly pain has many possible causes. So it can be hard to find the reason for your pain. Giving details about your pain can help. Tell your provider where and when you feel the pain, and what makes it better or worse. Also let your provider know if you have other symptoms such as:    Fever    Tiredness    Upset stomach (nausea)    Vomiting    Changes in bathroom habits  Treating abdominal pain  Some causes of pain need emergency medical treatment right away. These include appendicitis or a bowel blockage. Other problems can be treated with rest, fluids, or medicines. Your healthcare provider can give you specific instructions for treatment or self-care based on what is causing your pain.  If you have vomiting or diarrhea, sip water or other clear fluids. When you are ready to eat  solid foods again, start with small amounts of easy-to-digest, low-fat foods. These include apple sauce, toast, or crackers.   When to seek medical care  Call 911 or go to the hospital right away if you:    Can t pass stool and are vomiting    Are vomiting blood or have bloody diarrhea or black, tarry diarrhea    Have chest, neck, or shoulder pain    Feel like you might pass out    Have pain in your shoulder blades with nausea    Have sudden, severe belly pain    Have new, severe pain unlike any you have felt before    Have a belly that is rigid, hard, and tender to touch  Call your healthcare provider if you have:    Pain for more than 5 days    Bloating for more than 2 days    Diarrhea for more than 5 days    A fever of 100.4 F (38 C) or higher, or as directed by your healthcare provider    Pain that gets worse    Weight loss for no reason    Continued lack of appetite    Blood in your stool  How to prevent abdominal pain  Here are some tips to help prevent abdominal pain:    Eat smaller amounts of food at one time.    Avoid greasy, fried, or other high-fat foods.    Avoid foods that give you gas.    Exercise regularly.    Drink plenty of fluids.  To help prevent GERD symptoms:    Quit smoking.    Reduce alcohol and certain foods that increase stomach acid.    Avoid aspirin and over-the-counter pain and fever medicines (NSAIDS or nonsteroidal anti-inflammatory drugs), if possible    Lose extra weight.    Finish eating at least 2 hours before you go to bed or lie down.    Raise the head of your bed.  Date Last Reviewed: 7/1/2016 2000-2018 [employer]. [address] [place] [address], [place] [address]. All rights reserved. This information is not intended as a substitute for professional medical care. Always follow your healthcare professional's instructions.

## 2019-05-03 NOTE — TELEPHONE ENCOUNTER
"S-(situation): Pt not sure when the pain started but some nights \"My gut feels weird when I go to bed\" Three days ago a work got cramps in \"gut\". Yesterday at work and was having pain \"in stomach area, comes and goes , cramps of pain\" Last night 3am woke up with pain. Pain is upper abd, in the center. Pain not constant. Rates pain on 1-10 scale as 4-5 \"manageable\". Loose stools or diarrhea for the past 3 days . No temp. No blood or mucous in stool    B-(background): in college took omeprazole but sx then more burning    A-(assessment): needs appt but not emergent    R-(recommendations): appt given this am    Jihan Martinez, RN, BSN         "

## 2019-06-16 ENCOUNTER — MYC MEDICAL ADVICE (OUTPATIENT)
Dept: OBGYN | Facility: CLINIC | Age: 29
End: 2019-06-16

## 2019-08-07 ENCOUNTER — OFFICE VISIT (OUTPATIENT)
Dept: INTERNAL MEDICINE | Facility: CLINIC | Age: 29
End: 2019-08-07
Payer: COMMERCIAL

## 2019-08-07 VITALS
OXYGEN SATURATION: 100 % | WEIGHT: 195 LBS | SYSTOLIC BLOOD PRESSURE: 114 MMHG | HEART RATE: 66 BPM | TEMPERATURE: 98.5 F | BODY MASS INDEX: 31.24 KG/M2 | DIASTOLIC BLOOD PRESSURE: 80 MMHG | RESPIRATION RATE: 14 BRPM

## 2019-08-07 DIAGNOSIS — K64.8 HEMORRHOIDS, INTERNAL, WITH BLEEDING: ICD-10-CM

## 2019-08-07 DIAGNOSIS — K64.4 EXTERNAL HEMORRHOIDS: Primary | ICD-10-CM

## 2019-08-07 PROCEDURE — 99213 OFFICE O/P EST LOW 20 MIN: CPT | Performed by: PHYSICIAN ASSISTANT

## 2019-08-07 NOTE — PROGRESS NOTES
Subjective     Melissa Macdonald is a 28 year old female who presents to clinic today for the following health issues:    HPI   Pt states she has noticed blood in the stool 2 times in the last two weeks. No rectal pain or abdominal pain or other Sx   Gastrointestinal symptoms      Duration: 2 times in 2 weeks     Description:           BLEEDING - bright red blood when wiping  Rectal soreness noted again today       Intensity:  mild    Accompanying signs and symptoms:  none    History  Previous {similar problem: YES possible small hemorrhoid in the past   Previous evaluation:      Aggravating factors: none    Alleviating factors: nothing    Other Therapies tried: None      BP Readings from Last 3 Encounters:   08/07/19 114/80   05/03/19 102/69   04/17/19 110/76    Wt Readings from Last 3 Encounters:   08/07/19 88.5 kg (195 lb)   05/03/19 88.5 kg (195 lb)   04/17/19 87.5 kg (193 lb)                    -------------------------------------  Reviewed and updated as needed this visit by Provider  Allergies  Meds         Review of Systems   ROS COMP: Constitutional, HEENT, cardiovascular, pulmonary, gi and gu systems are negative, except as otherwise noted.      Objective    /80   Pulse 66   Temp 98.5  F (36.9  C) (Temporal)   Resp 14   Wt 88.5 kg (195 lb)   LMP 08/04/2019 (Exact Date)   SpO2 100%   BMI 31.24 kg/m    Body mass index is 31.24 kg/m .  Physical Exam   GENERAL: healthy, alert and no distress  RESP: lungs clear to auscultation - no rales, rhonchi or wheezes  CV: regular rates and rhythm  ABDOMEN: soft, nontender, no hepatosplenomegaly, no masses and bowel sounds normal  RECTAL (female): small external  Hemorrhoid noted not thrombosed, and internal prolapse hemorrhoid and stool guiacmild positive- from near internal hemorrhoid     Diagnostic Test Results:  Labs reviewed in Epic        Assessment & Plan       ICD-10-CM    1. External hemorrhoids K64.4    2. Hemorrhoids, internal, with bleeding  "K64.8         BMI:   Estimated body mass index is 31.24 kg/m  as calculated from the following:    Height as of 5/3/19: 1.683 m (5' 6.25\").    Weight as of this encounter: 88.5 kg (195 lb).   Weight management plan: Patient was referred to their PCP to discuss a diet and exercise plan.        Patient Instructions     Patient Education     Understanding Hemorrhoids    Hemorrhoid tissues are  cushions  of blood vessels that swell slightly during bowel movements. Too much pressure on the anal canal can make these tissues remain enlarged, become inflamed, and cause symptoms. This can happen both inside and outside the anal canal.  Parts of the anal canal  The parts of the anal canal are:    Internal hemorrhoid tissue is in the upper area of the anal canal.    The rectum is the last several inches of the colon. This is where stool is stored prior to bowel movements.    Anal sphincters are ring-shaped muscles that expand and contract to control the anal opening.    External hemorrhoid tissue lies under the anal skin.    The anus is the passage between the rectum and the outside of the body.  Normal hemorrhoid tissue  Hemorrhoid tissues play an important role in helping your body eliminate waste. Food passes from the stomach through the intestines. The waste (stool) then travels through the colon to the rectum. It is stored in the rectum until it s ready to be passed from the anus. During bowel movements, hemorrhoids swell with blood and become slightly larger. This swelling helps protect and cushion the anal canal as stool passes from the body. Once the stool has passed, the tissues stop swelling and return to normal.  Problem hemorrhoids  Pressure due to straining or other factors can cause hemorrhoid tissues to remain swollen. When this happens to the hemorrhoid tissues in the anal canal they re called internal hemorrhoids. Swollen tissues around the anal opening are called external hemorrhoids. Depending on the " location, your symptoms can differ.    Internal hemorrhoids often happen in clusters around the wall of the anal canal. They are usually painless. But they may prolapse (protrude out of the anus) due to straining or pressure from hard stool. After the bowel movement is over, they may then reduce (return inside the body). Internal hemorrhoids often bleed. They can also discharge mucus.      External hemorrhoids are located at the anal opening, just beneath the skin. These tissues rarely cause problems unless they thrombose (form a blood clot). When this happens, a hard, bluish lump may appear. A thrombosed hemorrhoid also causes sudden, severe pain. In time, the clot may go away on its own. This sometimes leaves a  skin tag  of tissue stretched by the clot.  Hemorrhoid symptoms  Hemorrhoid symptoms may include:    Pain or a burning sensation    Bleeding during bowel movements    Protrusion of tissue from the anus    Itching around the anus  Causes of hemorrhoids  There s no single cause of hemorrhoids. Most often, though, they are caused by too much pressure on the anal canal. This can be due to:    Chronic (ongoing) constipation    Straining during bowel movements    Sitting too long on the toilet    Strenuous exercise or heavy lifting    Pregnancy and childbirth    Aging    Diarrhea  Date Last Reviewed: 7/1/2016 2000-2018 [employer]. [address] [place] [address], [place] [address]. All rights reserved. This information is not intended as a substitute for professional medical care. Always follow your healthcare professional's instructions.               Return in about 2 weeks (around 8/21/2019) for recheck if not improving, regular primary provider.    Ayah Aguilar PA-C  [facility]

## 2019-08-07 NOTE — PATIENT INSTRUCTIONS
Patient Education     Understanding Hemorrhoids    Hemorrhoid tissues are  cushions  of blood vessels that swell slightly during bowel movements. Too much pressure on the anal canal can make these tissues remain enlarged, become inflamed, and cause symptoms. This can happen both inside and outside the anal canal.  Parts of the anal canal  The parts of the anal canal are:    Internal hemorrhoid tissue is in the upper area of the anal canal.    The rectum is the last several inches of the colon. This is where stool is stored prior to bowel movements.    Anal sphincters are ring-shaped muscles that expand and contract to control the anal opening.    External hemorrhoid tissue lies under the anal skin.    The anus is the passage between the rectum and the outside of the body.  Normal hemorrhoid tissue  Hemorrhoid tissues play an important role in helping your body eliminate waste. Food passes from the stomach through the intestines. The waste (stool) then travels through the colon to the rectum. It is stored in the rectum until it s ready to be passed from the anus. During bowel movements, hemorrhoids swell with blood and become slightly larger. This swelling helps protect and cushion the anal canal as stool passes from the body. Once the stool has passed, the tissues stop swelling and return to normal.  Problem hemorrhoids  Pressure due to straining or other factors can cause hemorrhoid tissues to remain swollen. When this happens to the hemorrhoid tissues in the anal canal they re called internal hemorrhoids. Swollen tissues around the anal opening are called external hemorrhoids. Depending on the location, your symptoms can differ.    Internal hemorrhoids often happen in clusters around the wall of the anal canal. They are usually painless. But they may prolapse (protrude out of the anus) due to straining or pressure from hard stool. After the bowel movement is over, they may then reduce (return inside the body).  Internal hemorrhoids often bleed. They can also discharge mucus.      External hemorrhoids are located at the anal opening, just beneath the skin. These tissues rarely cause problems unless they thrombose (form a blood clot). When this happens, a hard, bluish lump may appear. A thrombosed hemorrhoid also causes sudden, severe pain. In time, the clot may go away on its own. This sometimes leaves a  skin tag  of tissue stretched by the clot.  Hemorrhoid symptoms  Hemorrhoid symptoms may include:    Pain or a burning sensation    Bleeding during bowel movements    Protrusion of tissue from the anus    Itching around the anus  Causes of hemorrhoids  There s no single cause of hemorrhoids. Most often, though, they are caused by too much pressure on the anal canal. This can be due to:    Chronic (ongoing) constipation    Straining during bowel movements    Sitting too long on the toilet    Strenuous exercise or heavy lifting    Pregnancy and childbirth    Aging    Diarrhea  Date Last Reviewed: 7/1/2016 2000-2018 [employer]. [address] [place] [address], [place] [address]. All rights reserved. This information is not intended as a substitute for professional medical care. Always follow your healthcare professional's instructions.

## 2019-08-09 DIAGNOSIS — Z13.1 SCREENING FOR DIABETES MELLITUS: ICD-10-CM

## 2019-08-09 DIAGNOSIS — Z13.220 SCREENING FOR HYPERLIPIDEMIA: ICD-10-CM

## 2019-08-09 LAB
ALBUMIN SERPL-MCNC: 3.5 G/DL (ref 3.4–5)
ALP SERPL-CCNC: 68 U/L (ref 40–150)
ALT SERPL W P-5'-P-CCNC: 30 U/L (ref 0–50)
ANION GAP SERPL CALCULATED.3IONS-SCNC: 3 MMOL/L (ref 3–14)
AST SERPL W P-5'-P-CCNC: 18 U/L (ref 0–45)
BASOPHILS # BLD AUTO: 0 10E9/L (ref 0–0.2)
BASOPHILS NFR BLD AUTO: 0.6 %
BILIRUB SERPL-MCNC: 0.5 MG/DL (ref 0.2–1.3)
BUN SERPL-MCNC: 12 MG/DL (ref 7–30)
CALCIUM SERPL-MCNC: 8.7 MG/DL (ref 8.5–10.1)
CHLORIDE SERPL-SCNC: 111 MMOL/L (ref 94–109)
CHOLEST SERPL-MCNC: 155 MG/DL
CO2 SERPL-SCNC: 27 MMOL/L (ref 20–32)
CREAT SERPL-MCNC: 0.83 MG/DL (ref 0.52–1.04)
DEPRECATED CALCIDIOL+CALCIFEROL SERPL-MC: 36 UG/L (ref 20–75)
DIFFERENTIAL METHOD BLD: ABNORMAL
EOSINOPHIL # BLD AUTO: 0.1 10E9/L (ref 0–0.7)
EOSINOPHIL NFR BLD AUTO: 3.1 %
ERYTHROCYTE [DISTWIDTH] IN BLOOD BY AUTOMATED COUNT: 12.8 % (ref 10–15)
GFR SERPL CREATININE-BSD FRML MDRD: >90 ML/MIN/{1.73_M2}
GLUCOSE SERPL-MCNC: 80 MG/DL (ref 70–99)
HCT VFR BLD AUTO: 43.6 % (ref 35–47)
HDLC SERPL-MCNC: 64 MG/DL
HGB BLD-MCNC: 14.7 G/DL (ref 11.7–15.7)
LDLC SERPL CALC-MCNC: 75 MG/DL
LYMPHOCYTES # BLD AUTO: 1.3 10E9/L (ref 0.8–5.3)
LYMPHOCYTES NFR BLD AUTO: 37.1 %
MCH RBC QN AUTO: 31.4 PG (ref 26.5–33)
MCHC RBC AUTO-ENTMCNC: 33.7 G/DL (ref 31.5–36.5)
MCV RBC AUTO: 93 FL (ref 78–100)
MONOCYTES # BLD AUTO: 0.3 10E9/L (ref 0–1.3)
MONOCYTES NFR BLD AUTO: 7.6 %
NEUTROPHILS # BLD AUTO: 1.8 10E9/L (ref 1.6–8.3)
NEUTROPHILS NFR BLD AUTO: 51.6 %
NONHDLC SERPL-MCNC: 85 MG/DL
PLATELET # BLD AUTO: 225 10E9/L (ref 150–450)
POTASSIUM SERPL-SCNC: 4.4 MMOL/L (ref 3.4–5.3)
PROT SERPL-MCNC: 6.7 G/DL (ref 6.8–8.8)
RBC # BLD AUTO: 4.68 10E12/L (ref 3.8–5.2)
SODIUM SERPL-SCNC: 141 MMOL/L (ref 133–144)
TRIGL SERPL-MCNC: 50 MG/DL
WBC # BLD AUTO: 3.6 10E9/L (ref 4–11)

## 2019-08-09 PROCEDURE — 80053 COMPREHEN METABOLIC PANEL: CPT | Performed by: NURSE PRACTITIONER

## 2019-08-09 PROCEDURE — 36415 COLL VENOUS BLD VENIPUNCTURE: CPT | Performed by: NURSE PRACTITIONER

## 2019-08-09 PROCEDURE — 80061 LIPID PANEL: CPT | Performed by: NURSE PRACTITIONER

## 2019-08-09 PROCEDURE — 85025 COMPLETE CBC W/AUTO DIFF WBC: CPT | Performed by: NURSE PRACTITIONER

## 2019-08-09 PROCEDURE — 82306 VITAMIN D 25 HYDROXY: CPT | Performed by: NURSE PRACTITIONER

## 2019-08-14 ENCOUNTER — MYC MEDICAL ADVICE (OUTPATIENT)
Dept: FAMILY MEDICINE | Facility: CLINIC | Age: 29
End: 2019-08-14

## 2019-08-14 ENCOUNTER — E-VISIT (OUTPATIENT)
Dept: FAMILY MEDICINE | Facility: CLINIC | Age: 29
End: 2019-08-14
Payer: COMMERCIAL

## 2019-08-14 DIAGNOSIS — R09.81 NASAL CONGESTION: Primary | ICD-10-CM

## 2019-08-14 PROCEDURE — 99207 ZZC NO BILLABLE SERVICE THIS VISIT: CPT | Performed by: NURSE PRACTITIONER

## 2019-08-14 NOTE — TELEPHONE ENCOUNTER
Yanely Nicholas CNFP DNP     FYI    Patient to initiate E-visit. See Mychart.    Thanks! Eleanor Ward RN

## 2019-09-15 ENCOUNTER — OFFICE VISIT (OUTPATIENT)
Dept: URGENT CARE | Facility: URGENT CARE | Age: 29
End: 2019-09-15
Payer: COMMERCIAL

## 2019-09-15 ENCOUNTER — MYC MEDICAL ADVICE (OUTPATIENT)
Dept: OBGYN | Facility: CLINIC | Age: 29
End: 2019-09-15

## 2019-09-15 VITALS
BODY MASS INDEX: 31.24 KG/M2 | RESPIRATION RATE: 16 BRPM | HEART RATE: 76 BPM | TEMPERATURE: 97.5 F | DIASTOLIC BLOOD PRESSURE: 70 MMHG | WEIGHT: 195 LBS | SYSTOLIC BLOOD PRESSURE: 102 MMHG

## 2019-09-15 DIAGNOSIS — R30.0 DYSURIA: ICD-10-CM

## 2019-09-15 DIAGNOSIS — R82.90 NONSPECIFIC FINDING ON EXAMINATION OF URINE: ICD-10-CM

## 2019-09-15 DIAGNOSIS — N39.0 ACUTE UTI: Primary | ICD-10-CM

## 2019-09-15 LAB
ALBUMIN UR-MCNC: NEGATIVE MG/DL
APPEARANCE UR: CLEAR
BACTERIA #/AREA URNS HPF: ABNORMAL /HPF
BILIRUB UR QL STRIP: NEGATIVE
COLOR UR AUTO: YELLOW
GLUCOSE UR STRIP-MCNC: NEGATIVE MG/DL
HGB UR QL STRIP: ABNORMAL
KETONES UR STRIP-MCNC: NEGATIVE MG/DL
LEUKOCYTE ESTERASE UR QL STRIP: ABNORMAL
NITRATE UR QL: NEGATIVE
PH UR STRIP: 7.5 PH (ref 5–7)
RBC #/AREA URNS AUTO: ABNORMAL /HPF
SOURCE: ABNORMAL
SP GR UR STRIP: 1.01 (ref 1–1.03)
UROBILINOGEN UR STRIP-ACNC: 0.2 EU/DL (ref 0.2–1)
WBC #/AREA URNS AUTO: ABNORMAL /HPF

## 2019-09-15 PROCEDURE — 87086 URINE CULTURE/COLONY COUNT: CPT | Performed by: PHYSICIAN ASSISTANT

## 2019-09-15 PROCEDURE — 81001 URINALYSIS AUTO W/SCOPE: CPT | Performed by: INTERNAL MEDICINE

## 2019-09-15 PROCEDURE — 99213 OFFICE O/P EST LOW 20 MIN: CPT | Performed by: PHYSICIAN ASSISTANT

## 2019-09-15 RX ORDER — CEFDINIR 300 MG/1
300 CAPSULE ORAL 2 TIMES DAILY
Qty: 20 CAPSULE | Refills: 0 | Status: SHIPPED | OUTPATIENT
Start: 2019-09-15 | End: 2020-01-23

## 2019-09-15 NOTE — PROGRESS NOTES
Patient presents with:  Urinary Problem: dysuria,frequeny today    LMP 9/1/19    SUBJECTIVE:   Melissa Macdonald is a 29 year old female who  presents today for a possible UTI. Symptoms of dysuria and frequency have been going on since this morning.  Hematuria no.  sudden onsetand moderate.  There is no history of fever, chills, nausea or vomiting.  No history of vaginal or penile discharge. This patient does have a history of urinary tract infections, last was about 2 months ago. Patient denies long duration, rigors, flank pain, temperature > 101 degrees F. and Vomiting, significant nausea or diarrhea.    SH: here with , trying to conceive.    is a pharmacist    Past Medical History:   Diagnosis Date     History of miscarriage 12/2017    x2     Patient Active Problem List   Diagnosis     Persistent depressive disorder with anxious distress, currently mild     Dermatofibroma     History of miscarriage     History of gastroesophageal reflux (GERD)     Social History     Tobacco Use     Smoking status: Never Smoker     Smokeless tobacco: Never Used   Substance Use Topics     Alcohol use: Yes     Comment: Fewer than 2 drinks per week       ROS:   CONSTITUTIONAL:NEGATIVE for fever, chills, change in weight  INTEGUMENTARY/SKIN: NEGATIVE for worrisome rashes, moles or lesions  RESP:NEGATIVE for significant cough or SOB  CV: NEGATIVE for chest pain, palpitations or peripheral edema  : as per HPI  Review of systems negative except as stated above.    OBJECTIVE:  /70 (Cuff Size: Adult Large)   Pulse 76   Temp 97.5  F (36.4  C) (Oral)   Resp 16   Wt 88.5 kg (195 lb)   BMI 31.24 kg/m    GENERAL APPEARANCE: healthy, alert and no distress  ABDOMEN:  soft, nontender, no HSM or masses and bowel sounds normal  BACK: No CVA tenderness  SKIN: no suspicious lesions or rashes    ASSESSMENT:   Lower, uncomplicated urinary tract infection.    PLAN:  OMNICEF  As per ordered above  Drink plenty of fluids.   Prevention and treatment of UTI's discussed.Signs and symptoms of pyelonephritis mentioned.  Follow up with primary care physician if not improving

## 2019-09-15 NOTE — PATIENT INSTRUCTIONS
(N39.0) Acute UTI  (primary encounter diagnosis)  Comment:   Plan: cefdinir (OMNICEF) 300 MG capsule            (R30.0) Dysuria  Comment:   Plan: UA with Microscopic reflex to Culture          Urine Culture Aerobic Bacterial

## 2019-09-16 LAB
BACTERIA SPEC CULT: NORMAL
SPECIMEN SOURCE: NORMAL

## 2019-09-19 NOTE — TELEPHONE ENCOUNTER
I called patient and left a message regarding urology.    Referral will be placed.   Pete Cramer MD

## 2019-09-24 ENCOUNTER — OFFICE VISIT (OUTPATIENT)
Dept: UROLOGY | Facility: CLINIC | Age: 29
End: 2019-09-24
Payer: COMMERCIAL

## 2019-09-24 ENCOUNTER — TELEPHONE (OUTPATIENT)
Dept: OBGYN | Facility: CLINIC | Age: 29
End: 2019-09-24

## 2019-09-24 VITALS — DIASTOLIC BLOOD PRESSURE: 90 MMHG | SYSTOLIC BLOOD PRESSURE: 128 MMHG

## 2019-09-24 DIAGNOSIS — Z87.440 PERSONAL HISTORY OF URINARY TRACT INFECTION: Primary | ICD-10-CM

## 2019-09-24 DIAGNOSIS — R31.29 MICROSCOPIC HEMATURIA: ICD-10-CM

## 2019-09-24 LAB
ALBUMIN UR-MCNC: NEGATIVE MG/DL
APPEARANCE UR: CLEAR
BILIRUB UR QL STRIP: NEGATIVE
COLOR UR AUTO: YELLOW
GLUCOSE UR STRIP-MCNC: NEGATIVE MG/DL
HGB UR QL STRIP: ABNORMAL
KETONES UR STRIP-MCNC: NEGATIVE MG/DL
LEUKOCYTE ESTERASE UR QL STRIP: NEGATIVE
NITRATE UR QL: NEGATIVE
NON-SQ EPI CELLS #/AREA URNS LPF: NORMAL /LPF
PH UR STRIP: 6 PH (ref 5–7)
RBC #/AREA URNS AUTO: NORMAL /HPF
SOURCE: ABNORMAL
SP GR UR STRIP: <=1.005 (ref 1–1.03)
UROBILINOGEN UR STRIP-ACNC: 0.2 EU/DL (ref 0.2–1)
WBC #/AREA URNS AUTO: NORMAL /HPF

## 2019-09-24 PROCEDURE — 99204 OFFICE O/P NEW MOD 45 MIN: CPT | Performed by: UROLOGY

## 2019-09-24 PROCEDURE — 81001 URINALYSIS AUTO W/SCOPE: CPT | Performed by: UROLOGY

## 2019-09-24 NOTE — PATIENT INSTRUCTIONS
Please call [facility] to schedule a CT scan. [phone].  We will contact you with results.    Please contact your insurance company to make sure the CT scan is covered under your insurance plan.   Please call our office if you have questions or need to report any information, [phone].

## 2019-09-24 NOTE — TELEPHONE ENCOUNTER
Called and spoke to patient.   Advised they will run a pregnancy test if concerned.   Patient reassured.   Heide Chávez RN

## 2019-09-24 NOTE — PROGRESS NOTES
S: Patient is a 29-year-old  female who was seen the office today for a consultation with regard to frequent UTIs.  For last several months patient has had symptoms associate with UTIs but she has frequency urgency dysuria.  2 times she self treats this out with Macrodantin.  Urine cultures was not done obtained.  The last time which was several weeks ago she had similar symptoms at that time the urinalysis showed large amount of blood and white blood cell count in the urine but the urine culture showed only mixed bacteria.  Antibiotics did help with the infection symptoms.  She has no fever nausea or vomiting.  She has no gross hematuria.  She has no history of kidney stones or kidney diseases in the past.  She tries to get pregnant with her  therefore she has noticed that there is some association with these infections and sexual activities.  Current Outpatient Medications   Medication Sig Dispense Refill     ASPIRIN 81 PO        Prenatal Vit-Fe Fumarate-FA (PRENATAL VITAMIN PO)        cefdinir (OMNICEF) 300 MG capsule Take 1 capsule (300 mg) by mouth 2 times daily for 10 days 20 capsule 0     norgestim-eth estrad triphasic (ORTHO TRI-CYCLEN LO) 0.18/0.215/0.25 MG-25 MCG tablet Take 1 tablet by mouth daily (Patient not taking: Reported on 9/15/2019) 84 tablet 3     Allergies   Allergen Reactions     Dust Mites      Mold      Past Medical History:   Diagnosis Date     History of miscarriage 12/2017    x2     Past Surgical History:   Procedure Laterality Date     HC TOOTH EXTRACTION W/FORCEP        Family History   Problem Relation Age of Onset     Breast Cancer Mother 55     Obesity Mother      Obesity Father      Depression Father      Hypertension Father      Hyperlipidemia Father      Dementia Maternal Grandmother      Arthritis Paternal Grandmother      Coronary Artery Disease Paternal Grandfather      Coronary Artery Disease Maternal Grandfather      Obesity Brother      Anxiety Disorder  Brother      Social History     Socioeconomic History     Marital status:      Spouse name: Chu     Number of children: 0     Years of education: 18     Highest education level: None   Occupational History     Occupation:      Comment: [employer]   Social Needs     Financial resource strain: None     Food insecurity:     Worry: None     Inability: None     Transportation needs:     Medical: None     Non-medical: None   Tobacco Use     Smoking status: Never Smoker     Smokeless tobacco: Never Used   Substance and Sexual Activity     Alcohol use: Yes     Comment: Fewer than 2 drinks per week     Drug use: No     Sexual activity: Yes     Partners: Male     Birth control/protection: Pill   Lifestyle     Physical activity:     Days per week: None     Minutes per session: None     Stress: None   Relationships     Social connections:     Talks on phone: None     Gets together: None     Attends Gnosticism service: None     Active member of club or organization: None     Attends meetings of clubs or organizations: None     Relationship status: None     Intimate partner violence:     Fear of current or ex partner: None     Emotionally abused: None     Physically abused: None     Forced sexual activity: None   Other Topics Concern     Parent/sibling w/ CABG, MI or angioplasty before 65F 55M? No   Social History Narrative     None       REVIEW OF SYSTEMS  =================  C: NEGATIVE for fever, chills, change in weight  I: NEGATIVE for worrisome rashes, moles or lesions  E/M: NEGATIVE for ear, mouth and throat problems  R: NEGATIVE for significant cough or SHORTNESS OF BREATH  CV:  NEGATIVE for chest pain, palpitations or peripheral edema  GI: NEGATIVE for nausea, abdominal pain, heartburn, or change in bowel habits  NEURO: NEGATIVE numbness/weakness  : see HPI  PSYCH: NEGATIVE depression/anxiety  LYmph: no new enlarged lymph nodes  Ortho: no new trauma/movements      Physical  Exam:  BP (!) 128/90    Patient is pleasant, in no acute distress, good general condition.  Heart:  negative, PMI normal  Lung: no evidence of respiratory distress    Abdomen: Soft, nondistended, non tender. No masses. No rebound or guarding.   Exam: No CVA tenderness  Skin: Warm and dry.  No redness.  Neuro: grossly normal  Musculaskeletal: moving all extremities  Psych normal mood and affect  Musculoskeletal  moving all extremities  Hematologic/Lymphatic/Immunologic: normal ant/post cervical, axillary, supraclavicular and inguinal nodes    Assessment/Plan:   Pleasant 29-year-old  female with symptoms of frequent UTIs however last urine culture did not show anything obvious.  Giving the amount of blood in urine I will schedule patient for a CT scan stone protocol next for further evaluation.

## 2019-09-24 NOTE — TELEPHONE ENCOUNTER
Reason for Call:  Other call back    Detailed comments: Dr. Moise ordered a CT Scan for the patient and she states that she took a pregnancy test this morning and it was negative. Patient is wondering if that's enough proof to get the CT. Please call to advise.     Phone Number Patient can be reached at: Home number on file [phone] (home)    Best Time: any     Can we leave a detailed message on this number? YES    Call taken on 9/24/2019 at 2:59 PM by Inez Flores

## 2019-09-25 ENCOUNTER — ANCILLARY PROCEDURE (OUTPATIENT)
Dept: CT IMAGING | Facility: CLINIC | Age: 29
End: 2019-09-25
Attending: UROLOGY
Payer: COMMERCIAL

## 2019-09-25 DIAGNOSIS — R31.29 MICROSCOPIC HEMATURIA: ICD-10-CM

## 2019-09-25 DIAGNOSIS — Z87.440 PERSONAL HISTORY OF URINARY TRACT INFECTION: ICD-10-CM

## 2019-09-25 LAB — B-HCG SERPL-ACNC: <1 IU/L (ref 0–5)

## 2019-09-25 PROCEDURE — 74176 CT ABD & PELVIS W/O CONTRAST: CPT | Performed by: RADIOLOGY

## 2019-09-25 PROCEDURE — 84702 CHORIONIC GONADOTROPIN TEST: CPT | Performed by: RADIOLOGY

## 2019-09-25 PROCEDURE — 36415 COLL VENOUS BLD VENIPUNCTURE: CPT | Performed by: RADIOLOGY

## 2019-09-26 RX ORDER — NITROFURANTOIN MACROCRYSTALS 50 MG/1
CAPSULE ORAL
Qty: 60 CAPSULE | Refills: 0 | Status: SHIPPED | OUTPATIENT
Start: 2019-09-26 | End: 2020-08-20

## 2019-10-04 DIAGNOSIS — N39.0 ACUTE UTI: ICD-10-CM

## 2019-10-04 PROCEDURE — 87109 MYCOPLASMA: CPT | Performed by: NURSE PRACTITIONER

## 2019-10-11 LAB
BACTERIA SPEC CULT: NORMAL
SPECIMEN SOURCE: NORMAL

## 2019-10-11 NOTE — RESULT ENCOUNTER NOTE
Sharan Torres,    This note is to let you know that your urine sample came back negative for ureaplasma.     Please follow up with the urologist as you have scheduled.    Darshana MEZA CNP

## 2019-11-05 ENCOUNTER — OFFICE VISIT (OUTPATIENT)
Dept: URGENT CARE | Facility: URGENT CARE | Age: 29
End: 2019-11-05
Payer: COMMERCIAL

## 2019-11-05 ENCOUNTER — NURSE TRIAGE (OUTPATIENT)
Dept: FAMILY MEDICINE | Facility: CLINIC | Age: 29
End: 2019-11-05

## 2019-11-05 ENCOUNTER — ANCILLARY PROCEDURE (OUTPATIENT)
Dept: GENERAL RADIOLOGY | Facility: CLINIC | Age: 29
End: 2019-11-05
Attending: PREVENTIVE MEDICINE
Payer: COMMERCIAL

## 2019-11-05 VITALS
BODY MASS INDEX: 31.5 KG/M2 | HEIGHT: 66 IN | WEIGHT: 196 LBS | HEART RATE: 71 BPM | TEMPERATURE: 97.6 F | SYSTOLIC BLOOD PRESSURE: 116 MMHG | OXYGEN SATURATION: 98 % | DIASTOLIC BLOOD PRESSURE: 71 MMHG

## 2019-11-05 DIAGNOSIS — R07.9 CHEST PAIN, UNSPECIFIED TYPE: ICD-10-CM

## 2019-11-05 DIAGNOSIS — R07.9 CHEST PAIN, UNSPECIFIED TYPE: Primary | ICD-10-CM

## 2019-11-05 LAB
D DIMER PPP FEU-MCNC: 0.4 UG/ML FEU (ref 0–0.5)
HCG UR QL: NEGATIVE

## 2019-11-05 PROCEDURE — 99214 OFFICE O/P EST MOD 30 MIN: CPT | Performed by: PREVENTIVE MEDICINE

## 2019-11-05 PROCEDURE — 93000 ELECTROCARDIOGRAM COMPLETE: CPT | Performed by: PREVENTIVE MEDICINE

## 2019-11-05 PROCEDURE — 84484 ASSAY OF TROPONIN QUANT: CPT | Performed by: PREVENTIVE MEDICINE

## 2019-11-05 PROCEDURE — 36415 COLL VENOUS BLD VENIPUNCTURE: CPT | Performed by: PREVENTIVE MEDICINE

## 2019-11-05 PROCEDURE — 71046 X-RAY EXAM CHEST 2 VIEWS: CPT

## 2019-11-05 PROCEDURE — 81025 URINE PREGNANCY TEST: CPT | Performed by: PREVENTIVE MEDICINE

## 2019-11-05 PROCEDURE — 85379 FIBRIN DEGRADATION QUANT: CPT | Performed by: PREVENTIVE MEDICINE

## 2019-11-05 ASSESSMENT — MIFFLIN-ST. JEOR: SCORE: 1625.8

## 2019-11-05 NOTE — PROGRESS NOTES
SUBJECTIVE:  Melissa Macdonald is a 29 year old female who presents to the office with the CC of chest pain.  Patient complains of chest pressure/discomfort.  The pain is characterized as 4 out of 10, mild, localized and off and on pressure located left chest with radiation to none. Symptoms began 3 day(s) ago, gradual onset  Pain is associated with cough and stress/anxiety.  Pain is exacerbated by no known provoking events.  Pain is relieved by none.  Cardiac risk factors: negative for abnormal lipids, DM, HTN, tobacco and negative FH    Past Medical History:   Diagnosis Date     History of miscarriage 12/2017    x2         Current Outpatient Medications:      ASPIRIN 81 PO, , Disp: , Rfl:      CRANBERRY PO, , Disp: , Rfl:      norgestim-eth estrad triphasic (ORTHO TRI-CYCLEN LO) 0.18/0.215/0.25 MG-25 MCG tablet, Take 1 tablet by mouth daily, Disp: 84 tablet, Rfl: 3     Prenatal Vit-Fe Fumarate-FA (PRENATAL VITAMIN PO), , Disp: , Rfl:      Probiotic Product (PROBIOTIC DAILY PO), , Disp: , Rfl:      nitroFURantoin macrocrystal (MACRODANTIN) 50 MG capsule, Take 1 capsule before or after sex (Patient not taking: Reported on 11/5/2019), Disp: 60 capsule, Rfl: 0    Social History     Tobacco Use     Smoking status: Never Smoker     Smokeless tobacco: Never Used   Substance Use Topics     Alcohol use: Yes     Comment: Fewer than 2 drinks per week       ROS:CONSTITUTIONAL:NEGATIVE for fever, chills, change in weight  INTEGUMENTARY/SKIN: NEGATIVE for worrisome rashes, moles or lesions  EYES: NEGATIVE for vision changes or irritation  ENT/MOUTH: NEGATIVE for ear, mouth and throat problems  RESP:POSITIVE for cough-non productive and wheezing  CV: POSITIVE for chest pain/chest pressure  GI: NEGATIVE for nausea, abdominal pain, heartburn, or change in bowel habits  MUSCULOSKELETAL: NEGATIVE for significant arthralgias or myalgia  NEURO: NEGATIVE for weakness, dizziness or paresthesias  ENDOCRINE: NEGATIVE for temperature  "intolerance, skin/hair changes  HEME/ALLERGY/IMMUNE: NEGATIVE for bleeding problems  PSYCHIATRIC: NEGATIVE for changes in mood or affect    EXAM:  /71   Pulse 71   Temp 97.6  F (36.4  C) (Oral)   Ht 1.676 m (5' 6\")   Wt 88.9 kg (196 lb)   LMP 10/23/2019   SpO2 98%   BMI 31.64 kg/m    GENERAL APPEARANCE: healthy, alert and no distress  EYES: EOMI,  PERRL, conjunctiva clear  HENT: ear canals and TM's normal.  Nose and mouth without ulcers, erythema or lesions  NECK: supple, nontender, no lymphadenopathy  RESP: lungs clear to auscultation - no rales, rhonchi or wheezes  CV: regular rates and rhythm, normal S1 S2, no murmur noted  ABDOMEN:  soft, nontender, no HSM or masses and bowel sounds normal  NEURO: Normal strength and tone, sensory exam grossly normal,  normal speech and mentation  SKIN: no suspicious lesions or rashes   Slight ttp left chest, no rash    Office EKG demonstrates:  rate 65, appears normal, NSR,normal axis, normal intervals, no acute ST/T changes c/w ischemia,no LVH by voltage criteria  No previous tracings    Heart 1  PERC 1    Chest xray - no infiltrates, no edema, no effusion, normal cardiac silhouette    Assessment  / IMPRESSION  Chest pain, acute  D dimer, troponin both pending  Working diagnosis is musculoskeletal chest pain - advise heat and ice and tylenol, follow up if not improving.    PLAN:See orders in epic    "

## 2019-11-05 NOTE — PATIENT INSTRUCTIONS
Chest pain, acute  D dimer, troponin both pending  Working diagnosis is musculoskeletal chest pain - advise heat and ice and tylenol, follow up if not improving.

## 2019-11-05 NOTE — TELEPHONE ENCOUNTER
Reason for call:  Symptom   Symptom or request: pt called saying she is having this feeling in her left side of chest with breathing she doesn't think she is having a heart attack but has a weird feeling and wants to know should she come in and her co worker had ammonia and they said that what she had she would like a call back asap    Duration (how long have symptoms been present): few days  Have you been treated for this before? No    Additional comments: wants a call back on what to do    Phone number to reach patient:  Home number on file [phone] (home)    Best Time:  any    Can we leave a detailed message on this number?  YES

## 2019-11-05 NOTE — TELEPHONE ENCOUNTER
Pain in chest between arm pit and sternum, dull constant pain. No SOB,   Nausea, vomiting.sweating, dizziness, fever or difficulty breathing.Protocol Recommended Disposition: Urgent care center    Nurse Triage Follow Up: Patient informed of recommendations and reasons to call back or seek care in the . Patient verbalized understanding and agrees with the plan.   If she does start with any SOB, nausea, vomiting.sweating, dizziness, fever or difficulty breathing she will call 911.    Sangeeta Wolfe RN    I      Reason for Disposition    [1] Chest pain lasting <= 5 minutes AND [2] NO chest pain or cardiac symptoms now(Exceptions: pains lasting a few seconds)    Additional Information    Negative: Severe difficulty breathing (e.g., struggling for each breath, speaks in single words)    Negative: Difficult to awaken or acting confused (e.g., disoriented, slurred speech)    Negative: Shock suspected (e.g., cold/pale/clammy skin, too weak to stand, low BP, rapid pulse)    Negative: [1] Chest pain lasts > 5 minutes AND [2] history of heart disease  (i.e., heart attack, bypass surgery, angina, angioplasty, CHF; not just a heart murmur)    Negative: [1] Chest pain lasts > 5 minutes AND [2] described as crushing, pressure-like, or heavy    Negative: [1] Chest pain lasts > 5 minutes AND [2] age > 50    Negative: [1] Chest pain lasts > 5 minutes AND [2] age > 30 AND [3] at least one cardiac risk factor (i.e., hypertension, diabetes, obesity, smoker or strong family history of heart disease)    Negative: [1] Chest pain lasts > 5 minutes AND [2] not relieved with nitroglycerin    Negative: Passed out (i.e., lost consciousness, collapsed and was not responding)    Negative: Heart beating < 50 beats per minute OR > 140 beats per minute    Negative: Visible sweat on face or sweat dripping down face    Negative: Sounds like a life-threatening emergency to the triager    Negative: Followed a chest injury    Negative: SEVERE chest  "pain    Negative: [1] Intermittent  chest pain or \"angina\" AND [2] increasing in severity or frequency  (Exception: pains lasting a few seconds)    Negative: Pain also present in shoulder(s) or arm(s) or jaw  (Exception: pain is clearly made worse by movement)    Negative: Difficulty breathing    Negative: Dizziness or lightheadedness    Negative: Coughing up blood    Negative: Cocaine use within last 3 days    Negative: History of prior \"blood clot\" in leg or lungs (i.e., deep vein thrombosis, pulmonary embolism)    Negative: Recent illness requiring prolonged bedrest (i.e., immobilization)    Negative: Hip or leg fracture in past 2 months (e.g., had cast on leg or ankle)    Negative: Major surgery in the past month    Negative: Recent long-distance travel with prolonged time in car, bus, plane, or train (i.e., within past 2 weeks; 6 or  more hours duration)    Negative: Chest pain lasts > 5 minutes (Exceptions: chest pain occurring > 3 days ago and now asymptomatic; same as previously diagnosed heartburn and has accompanying sour taste in mouth)    Negative: Taking a deep breath makes pain worse    Negative: Patient sounds very sick or weak to the triager    Answer Assessment - Initial Assessment Questions  1. LOCATION: \"Where does it hurt?\"        Upper left chest area between sternum and arm pit  2. RADIATION: \"Does the pain go anywhere else?\" (e.g., into neck, jaw, arms, back)      No radiating pain  3. ONSET: \"When did the chest pain begin?\" (Minutes, hours or days)       Saturday  4. PATTERN \"Does the pain come and go, or has it been constant since it started?\"  \"Does it get worse with exertion?\"       Steady pain  5. DURATION: \"How long does it last\" (e.g., seconds, minutes, hours)      It's just there all the time  6. SEVERITY: \"How bad is the pain?\"  (e.g., Scale 1-10; mild, moderate, or severe)      - MILD (1-3): doesn't interfere with normal activities      - MODERATE (4-7): interferes with normal " "activities or awakens from sleep     - SEVERE (8-10): excruciating pain, unable to do any normal activities        Mild  7. CARDIAC RISK FACTORS: \"Do you have any history of heart problems or risk factors for heart disease?\" (e.g., prior heart attack, angina; high blood pressure, diabetes, being overweight, high cholesterol, smoking, or strong family history of heart disease)      none  8. PULMONARY RISK FACTORS: \"Do you have any history of lung disease?\"  (e.g., blood clots in lung, asthma, emphysema, birth control pills)      no  9. CAUSE: \"What do you think is causing the chest pain?\"      unknown  10. OTHER SYMPTOMS: \"Do you have any other symptoms?\" (e.g., dizziness, nausea, vomiting, sweating, fever, difficulty breathing, cough)        Slight cough  11. PREGNANCY: \"Is there any chance you are pregnant?\" \"When was your last menstrual period?\"        unknown    Protocols used: CHEST PAIN-A-AH    "

## 2019-11-06 LAB — TROPONIN I SERPL-MCNC: <0.015 UG/L (ref 0–0.04)

## 2019-11-12 ASSESSMENT — ENCOUNTER SYMPTOMS
FEVER: 0
INCREASED ENERGY: 0
FATIGUE: 0
POLYDIPSIA: 0
HALLUCINATIONS: 0
POLYPHAGIA: 0
CHILLS: 0
NIGHT SWEATS: 0
ALTERED TEMPERATURE REGULATION: 0
WEIGHT LOSS: 0
WEIGHT GAIN: 0
DECREASED APPETITE: 0

## 2019-11-13 ENCOUNTER — OFFICE VISIT (OUTPATIENT)
Dept: UROLOGY | Facility: CLINIC | Age: 29
End: 2019-11-13
Payer: COMMERCIAL

## 2019-11-13 VITALS
HEART RATE: 82 BPM | HEIGHT: 66 IN | WEIGHT: 195 LBS | SYSTOLIC BLOOD PRESSURE: 130 MMHG | DIASTOLIC BLOOD PRESSURE: 78 MMHG | BODY MASS INDEX: 31.34 KG/M2

## 2019-11-13 DIAGNOSIS — Z87.440 PERSONAL HISTORY OF URINARY TRACT INFECTION: Primary | ICD-10-CM

## 2019-11-13 LAB
ALBUMIN UR-MCNC: NEGATIVE MG/DL
APPEARANCE UR: CLEAR
BILIRUB UR QL STRIP: NEGATIVE
COLOR UR AUTO: YELLOW
GLUCOSE UR STRIP-MCNC: NEGATIVE MG/DL
HGB UR QL STRIP: ABNORMAL
KETONES UR STRIP-MCNC: NEGATIVE MG/DL
LEUKOCYTE ESTERASE UR QL STRIP: NEGATIVE
NITRATE UR QL: NEGATIVE
PH UR STRIP: 5.5 PH (ref 5–7)
RESIDUAL VOLUME (RV) (EXTERNAL): 74
SOURCE: ABNORMAL
SP GR UR STRIP: 1.01 (ref 1–1.03)
UROBILINOGEN UR STRIP-ACNC: 0.2 EU/DL (ref 0.2–1)

## 2019-11-13 PROCEDURE — 51798 US URINE CAPACITY MEASURE: CPT | Performed by: UROLOGY

## 2019-11-13 PROCEDURE — 99214 OFFICE O/P EST MOD 30 MIN: CPT | Mod: 25 | Performed by: UROLOGY

## 2019-11-13 PROCEDURE — 81003 URINALYSIS AUTO W/O SCOPE: CPT | Performed by: UROLOGY

## 2019-11-13 ASSESSMENT — ENCOUNTER SYMPTOMS
SWOLLEN GLANDS: 0
EXTREMITY NUMBNESS: 0
EXERCISE INTOLERANCE: 0
NERVOUS/ANXIOUS: 0
SEIZURES: 0
CHILLS: 0
SORE THROAT: 0
DECREASED CONCENTRATION: 0
ABDOMINAL PAIN: 0
SLEEP DISTURBANCES DUE TO BREATHING: 0
LOSS OF CONSCIOUSNESS: 0
CLAUDICATION: 0
TINGLING: 0
ARTHRALGIAS: 0
ORTHOPNEA: 0
BRUISES/BLEEDS EASILY: 0
TREMORS: 0
DECREASED APPETITE: 0
NAIL CHANGES: 0
POSTURAL DYSPNEA: 0
SHORTNESS OF BREATH: 0
BREAST PAIN: 0
WEIGHT LOSS: 0
JOINT SWELLING: 0
INSOMNIA: 0
SKIN CHANGES: 0
POLYPHAGIA: 0
EYE IRRITATION: 0
RECTAL BLEEDING: 0
DIZZINESS: 0
TROUBLE SWALLOWING: 0
BOWEL INCONTINENCE: 0
FLANK PAIN: 0
NUMBNESS: 0
MUSCLE CRAMPS: 0
BREAST MASS: 0
CONSTIPATION: 0
TACHYCARDIA: 0
SMELL DISTURBANCE: 0
HEMOPTYSIS: 0
SNORES LOUDLY: 0
RESPIRATORY PAIN: 0
DYSPNEA ON EXERTION: 0
BLOATING: 0
FEVER: 0
LEG PAIN: 0
FATIGUE: 0
MEMORY LOSS: 0
HOT FLASHES: 0
TASTE DISTURBANCE: 0
COUGH DISTURBING SLEEP: 0
MYALGIAS: 0
NECK PAIN: 0
HEADACHES: 0
RECTAL PAIN: 0
DECREASED LIBIDO: 0
LEG SWELLING: 0
DIARRHEA: 0
DIFFICULTY URINATING: 0
DYSURIA: 0
COUGH: 0
SPEECH CHANGE: 0
BLOOD IN STOOL: 0
DISTURBANCES IN COORDINATION: 0
PANIC: 0
BACK PAIN: 0
WHEEZING: 0
WEAKNESS: 0
SINUS CONGESTION: 0
MUSCLE WEAKNESS: 0
VOMITING: 0
PALPITATIONS: 0
HALLUCINATIONS: 0
WEIGHT GAIN: 0
POOR WOUND HEALING: 0
SINUS PAIN: 0
INCREASED ENERGY: 0
EYE PAIN: 0
SYNCOPE: 0
NIGHT SWEATS: 0
HYPOTENSION: 0
LIGHT-HEADEDNESS: 0
HEMATURIA: 0
STIFFNESS: 0
DOUBLE VISION: 0
NAUSEA: 0
HYPERTENSION: 0
HOARSE VOICE: 0
JAUNDICE: 0
PARALYSIS: 0
EYE REDNESS: 0
NECK MASS: 0
DEPRESSION: 0
EYE WATERING: 0
POLYDIPSIA: 0
HEARTBURN: 0
SPUTUM PRODUCTION: 0
ALTERED TEMPERATURE REGULATION: 0

## 2019-11-13 ASSESSMENT — PAIN SCALES - GENERAL: PAINLEVEL: NO PAIN (0)

## 2019-11-13 ASSESSMENT — MIFFLIN-ST. JEOR: SCORE: 1621.26

## 2019-11-13 NOTE — PROGRESS NOTES
November 13, 2019    Referring Provider: Referred Self, MD  No address on file    Primary Care Provider: Darshana Melo    CC: Recurrent UTI    HPI:  Melissa Macdonald is a 29 year old female who presents for evaluation of her pelvic floor symptoms.  She has been suffering from Jeff.  She recently saw Dr Moise.  CT scan was done without nephrolithiasis or other obvious nidus for UTI.  She has been placed on postcoital antibiotics.  She is mostly here today as she had several questions she wanted to address and consider all her options, especially in light of the fact that she is trying to start a family in the near future.  Her  is a pharmacist who works for [employer]    She has not had gross hematuria, febrile UTI or pyelonephritis.  She has not had any prior urologic surgery or intervention.  Denies constipation.    Past Medical History:   Diagnosis Date     History of miscarriage 12/2017    x2     Past Surgical History:   Procedure Laterality Date     HC TOOTH EXTRACTION W/FORCEP       Social History     Socioeconomic History     Marital status:      Spouse name: Chu     Number of children: 0     Years of education: 18     Highest education level: Not on file   Occupational History     Occupation:      Comment: [place] [employer]   Social Needs     Financial resource strain: Not on file     Food insecurity:     Worry: Not on file     Inability: Not on file     Transportation needs:     Medical: Not on file     Non-medical: Not on file   Tobacco Use     Smoking status: Never Smoker     Smokeless tobacco: Never Used   Substance and Sexual Activity     Alcohol use: Yes     Comment: Fewer than 2 drinks per week     Drug use: No     Sexual activity: Yes     Partners: Male     Birth control/protection: Pill   Lifestyle     Physical activity:     Days per week: Not on file     Minutes per session: Not on file     Stress: Not on file   Relationships     Social  connections:     Talks on phone: Not on file     Gets together: Not on file     Attends Zoroastrianism service: Not on file     Active member of club or organization: Not on file     Attends meetings of clubs or organizations: Not on file     Relationship status: Not on file     Intimate partner violence:     Fear of current or ex partner: Not on file     Emotionally abused: Not on file     Physically abused: Not on file     Forced sexual activity: Not on file   Other Topics Concern     Parent/sibling w/ CABG, MI or angioplasty before 65F 55M? No   Social History Narrative     Not on file     Family History   Problem Relation Age of Onset     Breast Cancer Mother 55     Obesity Mother      Obesity Father      Depression Father      Hypertension Father      Hyperlipidemia Father      Dementia Maternal Grandmother      Arthritis Paternal Grandmother      Coronary Artery Disease Paternal Grandfather      Coronary Artery Disease Maternal Grandfather      Obesity Brother      Anxiety Disorder Brother        Review of Systems     Constitutional:  Positive for recent stressors. Negative for fever, chills, weight loss, weight gain, fatigue, decreased appetite, night sweats, height loss, post-operative complications, incisional pain, hallucinations, increased energy, hyperactivity and confused.   HENT:  Negative for ear pain, hearing loss, tinnitus, nosebleeds, trouble swallowing, hoarse voice, mouth sores, sore throat, ear discharge, tooth pain, gum tenderness, taste disturbance, smell disturbance, hearing aid, bleeding gums, dry mouth, sinus pain, sinus congestion and neck mass.    Eyes:  Negative for double vision, pain, redness, eye pain, decreased vision, eye watering, eye bulging, eye dryness, flashing lights, spots, floaters, strabismus, tunnel vision, jaundice and eye irritation.   Respiratory:   Negative for cough, hemoptysis, sputum production, shortness of breath, wheezing, sleep disturbances due to breathing, snores  loudly, respiratory pain, dyspnea on exertion, cough disturbing sleep and postural dyspnea.    Cardiovascular:  Negative for chest pain, dyspnea on exertion, palpitations, orthopnea, claudication, leg swelling, fingers/toes turn blue, hypertension, hypotension, syncope, history of heart murmur, chest pain on exertion, chest pain at rest, pacemaker, few scattered varicosities, leg pain, sleep disturbances due to breathing, tachycardia, light-headedness, exercise intolerance and edema.   Gastrointestinal:  Negative for heartburn, nausea, vomiting, abdominal pain, diarrhea, constipation, blood in stool, melena, rectal pain, bloating, hemorrhoids, bowel incontinence, jaundice, rectal bleeding, coffee ground emesis and change in stool.   Genitourinary:  Negative for bladder incontinence, dysuria, urgency, hematuria, flank pain, vaginal discharge, difficulty urinating, genital sores, dyspareunia, decreased libido, nocturia, voiding less frequently, arousal difficulty, abnormal vaginal bleeding, excessive menstruation, menstrual changes, hot flashes, vaginal dryness and postmenopausal bleeding.   Musculoskeletal:  Negative for myalgias, back pain, joint swelling, arthralgias, stiffness, muscle cramps, neck pain, bone pain, muscle weakness and fracture.   Skin:  Negative for nail changes, itching, poor wound healing, rash, hair changes, skin changes, acne, warts, poor wound healing, scarring, flaky skin, Raynaud's phenomenon, sensitivity to sunlight and skin thickening.   Neurological:  Negative for dizziness, tingling, tremors, speech change, seizures, loss of consciousness, weakness, light-headedness, numbness, headaches, disturbances in coordination, extremity numbness, memory loss, difficulty walking and paralysis.   Endo/Heme:  Negative for anemia, swollen glands and bruises/bleeds easily.   Psychiatric/Behavioral:  Negative for depression, hallucinations, memory loss, decreased concentration, mood swings and panic  "attacks.    Breast:  Negative for breast discharge, breast mass, breast pain and nipple retraction.   Endocrine:  Negative for altered temperature regulation, polyphagia, polydipsia, unwanted hair growth and change in facial hair.    Allergies   Allergen Reactions     Dust Mites      Mold      Current Outpatient Medications   Medication     ASPIRIN 81 PO     CRANBERRY PO     nitroFURantoin macrocrystal (MACRODANTIN) 50 MG capsule     Prenatal Vit-Fe Fumarate-FA (PRENATAL VITAMIN PO)     Probiotic Product (PROBIOTIC DAILY PO)     norgestim-eth estrad triphasic (ORTHO TRI-CYCLEN LO) 0.18/0.215/0.25 MG-25 MCG tablet     No current facility-administered medications for this visit.      /78   Pulse 82   Ht 1.676 m (5' 6\")   Wt 88.5 kg (195 lb)   LMP 10/23/2019   BMI 31.47 kg/m   Patient's last menstrual period was 10/23/2019. Body mass index is 31.47 kg/m .  She is alert and oriented.  She is well groomed, comfortable in no acute distress.  Eyes have anicteric sclerae, moist conjunctivae.  Normal mood and affect.   Normocephalic, atraumatic without masses, lesions, obvious abnormalities.  Non-labored breathing.  Skin dry, warm to touch. No lower extremity edema.  Full ROM in extremities with normal gait.      Urine dip trace blood    PVR 74 mL by bladder scan    A/P: Melissa Macdonald is a 29 year old F with postcoital UTI    We discussed that at this time there is no obvious nidus for UTI.  We discussed options for UTI prevention to include the postcoital antibiotics that she is on but also discussed some of the data on the supplements.      At this time discussed importance of monitoring and to come in for a urine if there is concern for UTI.  Discussed the concerns with UTI in pregnancy and that certainly she would be monitored for bacteriuria given risk of  labor.      We discussed options for a cystoscopy but given her young age and the fact that she is otherwise healthy without concerning risk " factors or symptoms that the yield would likely be low and that we would postpone this for now.    30 minutes were spent with the patient today, > 50% in counseling and coordination of care    Lisa Osullivan MD MPH    Urology    CC  Patient Care Team:  Darshana Melo APRN CNP as PCP - General (Nurse Practitioner)  Darshana Melo APRN CNP as Assigned PCP  SELF, REFERRED

## 2019-11-13 NOTE — PATIENT INSTRUCTIONS
Websites with free information:    [employer] patient website: www.voicesforpfd.org    Total Control Program: www.totalcontrolprogram.com    Supplements to prevent UTI to consider  -probiotics  -cranberry   Ellura: www.myellura.com   KAROLINA magana HP by [employer]  -d-mannose    If you think you have an infection [phone] ([place])  OR [facility] [phone] or [phone]    It was a pleasure meeting with you today.  Thank you for allowing me and my team the privilege of caring for you today.  YOU are the reason we are here, and I truly hope we provided you with the excellent service you deserve.  Please let us know if there is anything else we can do for you so that we can be sure you are leaving completely satisfied with your care experience.

## 2019-11-13 NOTE — NURSING NOTE
Chief Complaint   Patient presents with     UTI     Pt here for recurrent UTI     PVR is 74mL  Taylor Smalls, CMA

## 2019-11-13 NOTE — LETTER
11/13/2019       RE: Melissa Macdonald  [address] [facility] [address]     Dear Colleague,    Thank you for referring your patient, Melissa Macdonald, to the [facility]. Please see a copy of my visit note below.    November 13, 2019    Referring Provider: Referred Self, MD  No address on file    Primary Care Provider: Darshana Melo    CC: Recurrent UTI    HPI:  Melissa Macdonald is a 29 year old female who presents for evaluation of her pelvic floor symptoms.  She has been suffering from Jeff.  She recently saw Dr Moise.  CT scan was done without nephrolithiasis or other obvious nidus for UTI.  She has been placed on postcoital antibiotics.  She is mostly here today as she had several questions she wanted to address and consider all her options, especially in light of the fact that she is trying to start a family in the near future.  Her  is a pharmacist who works for [employer]    She has not had gross hematuria, febrile UTI or pyelonephritis.  She has not had any prior urologic surgery or intervention.  Denies constipation.    Past Medical History:   Diagnosis Date     History of miscarriage 12/2017    x2     Past Surgical History:   Procedure Laterality Date     HC TOOTH EXTRACTION W/FORCEP       Social History     Socioeconomic History     Marital status:      Spouse name: Chu     Number of children: 0     Years of education: 18     Highest education level: Not on file   Occupational History     Occupation:      Comment: [place] [employer]   Social Needs     Financial resource strain: Not on file     Food insecurity:     Worry: Not on file     Inability: Not on file     Transportation needs:     Medical: Not on file     Non-medical: Not on file   Tobacco Use     Smoking status: Never Smoker     Smokeless tobacco: Never Used   Substance and Sexual Activity      Alcohol use: Yes     Comment: Fewer than 2 drinks per week     Drug use: No     Sexual activity: Yes     Partners: Male     Birth control/protection: Pill   Lifestyle     Physical activity:     Days per week: Not on file     Minutes per session: Not on file     Stress: Not on file   Relationships     Social connections:     Talks on phone: Not on file     Gets together: Not on file     Attends Yarsanism service: Not on file     Active member of club or organization: Not on file     Attends meetings of clubs or organizations: Not on file     Relationship status: Not on file     Intimate partner violence:     Fear of current or ex partner: Not on file     Emotionally abused: Not on file     Physically abused: Not on file     Forced sexual activity: Not on file   Other Topics Concern     Parent/sibling w/ CABG, MI or angioplasty before 65F 55M? No   Social History Narrative     Not on file     Family History   Problem Relation Age of Onset     Breast Cancer Mother 55     Obesity Mother      Obesity Father      Depression Father      Hypertension Father      Hyperlipidemia Father      Dementia Maternal Grandmother      Arthritis Paternal Grandmother      Coronary Artery Disease Paternal Grandfather      Coronary Artery Disease Maternal Grandfather      Obesity Brother      Anxiety Disorder Brother        Review of Systems     Constitutional:  Positive for recent stressors. Negative for fever, chills, weight loss, weight gain, fatigue, decreased appetite, night sweats, height loss, post-operative complications, incisional pain, hallucinations, increased energy, hyperactivity and confused.   HENT:  Negative for ear pain, hearing loss, tinnitus, nosebleeds, trouble swallowing, hoarse voice, mouth sores, sore throat, ear discharge, tooth pain, gum tenderness, taste disturbance, smell disturbance, hearing aid, bleeding gums, dry mouth, sinus pain, sinus congestion and neck mass.    Eyes:  Negative for double vision, pain,  redness, eye pain, decreased vision, eye watering, eye bulging, eye dryness, flashing lights, spots, floaters, strabismus, tunnel vision, jaundice and eye irritation.   Respiratory:   Negative for cough, hemoptysis, sputum production, shortness of breath, wheezing, sleep disturbances due to breathing, snores loudly, respiratory pain, dyspnea on exertion, cough disturbing sleep and postural dyspnea.    Cardiovascular:  Negative for chest pain, dyspnea on exertion, palpitations, orthopnea, claudication, leg swelling, fingers/toes turn blue, hypertension, hypotension, syncope, history of heart murmur, chest pain on exertion, chest pain at rest, pacemaker, few scattered varicosities, leg pain, sleep disturbances due to breathing, tachycardia, light-headedness, exercise intolerance and edema.   Gastrointestinal:  Negative for heartburn, nausea, vomiting, abdominal pain, diarrhea, constipation, blood in stool, melena, rectal pain, bloating, hemorrhoids, bowel incontinence, jaundice, rectal bleeding, coffee ground emesis and change in stool.   Genitourinary:  Negative for bladder incontinence, dysuria, urgency, hematuria, flank pain, vaginal discharge, difficulty urinating, genital sores, dyspareunia, decreased libido, nocturia, voiding less frequently, arousal difficulty, abnormal vaginal bleeding, excessive menstruation, menstrual changes, hot flashes, vaginal dryness and postmenopausal bleeding.   Musculoskeletal:  Negative for myalgias, back pain, joint swelling, arthralgias, stiffness, muscle cramps, neck pain, bone pain, muscle weakness and fracture.   Skin:  Negative for nail changes, itching, poor wound healing, rash, hair changes, skin changes, acne, warts, poor wound healing, scarring, flaky skin, Raynaud's phenomenon, sensitivity to sunlight and skin thickening.   Neurological:  Negative for dizziness, tingling, tremors, speech change, seizures, loss of consciousness, weakness, light-headedness, numbness,  "headaches, disturbances in coordination, extremity numbness, memory loss, difficulty walking and paralysis.   Endo/Heme:  Negative for anemia, swollen glands and bruises/bleeds easily.   Psychiatric/Behavioral:  Negative for depression, hallucinations, memory loss, decreased concentration, mood swings and panic attacks.    Breast:  Negative for breast discharge, breast mass, breast pain and nipple retraction.   Endocrine:  Negative for altered temperature regulation, polyphagia, polydipsia, unwanted hair growth and change in facial hair.    Allergies   Allergen Reactions     Dust Mites      Mold      Current Outpatient Medications   Medication     ASPIRIN 81 PO     CRANBERRY PO     nitroFURantoin macrocrystal (MACRODANTIN) 50 MG capsule     Prenatal Vit-Fe Fumarate-FA (PRENATAL VITAMIN PO)     Probiotic Product (PROBIOTIC DAILY PO)     norgestim-eth estrad triphasic (ORTHO TRI-CYCLEN LO) 0.18/0.215/0.25 MG-25 MCG tablet     No current facility-administered medications for this visit.      /78   Pulse 82   Ht 1.676 m (5' 6\")   Wt 88.5 kg (195 lb)   LMP 10/23/2019   BMI 31.47 kg/m    Patient's last menstrual period was 10/23/2019. Body mass index is 31.47 kg/m .  She is alert and oriented.  She is well groomed, comfortable in no acute distress.  Eyes have anicteric sclerae, moist conjunctivae.  Normal mood and affect.   Normocephalic, atraumatic without masses, lesions, obvious abnormalities.  Non-labored breathing.  Skin dry, warm to touch. No lower extremity edema.  Full ROM in extremities with normal gait.      Urine dip trace blood    PVR 74 mL by bladder scan    A/P: Melissa Macdonald is a 29 year old F with postcoital UTI    We discussed that at this time there is no obvious nidus for UTI.  We discussed options for UTI prevention to include the postcoital antibiotics that she is on but also discussed some of the data on the supplements.      At this time discussed importance of monitoring and to come " in for a urine if there is concern for UTI.  Discussed the concerns with UTI in pregnancy and that certainly she would be monitored for bacteriuria given risk of  labor.      We discussed options for a cystoscopy but given her young age and the fact that she is otherwise healthy without concerning risk factors or symptoms that the yield would likely be low and that we would postpone this for now.    30 minutes were spent with the patient today, > 50% in counseling and coordination of care    Lisa Osullivan MD MPH    Urology    CC  Patient Care Team:  Darshana Melo APRN CNP as PCP - General (Nurse Practitioner)

## 2019-11-20 PROBLEM — Z87.440 PERSONAL HISTORY OF URINARY TRACT INFECTION: Status: ACTIVE | Noted: 2019-11-20

## 2019-12-16 ENCOUNTER — TELEPHONE (OUTPATIENT)
Dept: OBGYN | Facility: CLINIC | Age: 29
End: 2019-12-16

## 2019-12-16 NOTE — TELEPHONE ENCOUNTER
Reason for call:  Symptom   Symptom or request: Lump under right breast     Duration (how long have symptoms been present): 3 weeks   Have you been treated for this before? No    Additional comments: Patient is real worried if being seen in 2 weeks makes a difference and also is trying to get pregnant and needs to know if she should be doing that at this time.     Phone number to reach patient:  Home number on file [phone] (home)    Best Time:  any    Can we leave a detailed message on this number?  YES   Returned call will be there for biopsy

## 2019-12-16 NOTE — TELEPHONE ENCOUNTER
Please help patient schedule an appointment to be seen.  She can be seen in primary care if she does not want to wait for her scheduled appointment.

## 2019-12-19 ENCOUNTER — MYC MEDICAL ADVICE (OUTPATIENT)
Dept: OBGYN | Facility: CLINIC | Age: 29
End: 2019-12-19

## 2019-12-19 DIAGNOSIS — N63.0 LUMP OR MASS IN BREAST: Primary | ICD-10-CM

## 2019-12-21 ENCOUNTER — NURSE TRIAGE (OUTPATIENT)
Dept: NURSING | Facility: CLINIC | Age: 29
End: 2019-12-21

## 2019-12-21 NOTE — TELEPHONE ENCOUNTER
Dr Cramer had just tried calling the patient in response to a MyChart question and the patient just missed the call. She is calling to let us know she has her phone with her and is able to take another call from Dr Cramer who is on call for [facility]. Number to reach Melissa, [phone].   1:10 pm Via [facility] I did sent Dr Cramer a note with this information.    Beth Butler RN  [facility]  Triage Nurse Advisors      Reason for Disposition    [1] Follow-up call to recent contact AND [2] information only call, no triage required    Additional Information    Negative: Lab result questions    Negative: [1] Caller is not with the child AND [2] is reporting urgent symptoms    Negative: Medication or pharmacy questions    Negative: Caller is rude or angry    Negative: Caller cannot be reached by phone    Negative: Caller has already spoken to PCP or another triager    Negative: RN needs further essential information from caller in order to complete triage    Negative: Requesting regular office appointment    Negative: [1] Caller requesting nonurgent health information AND [2] PCP's office is the best resource    Negative: Health Information question, no triage required and triager able to answer question    Negative: [place] Information question, no triage required and triager able to answer question    Negative: Behavior or development information question, no triage required and triager able to answer question    Negative: General information question, no triage required and triager able to answer question    Negative: Question about upcoming scheduled test, no triage required and triager able to answer question    Negative: [1] Caller is not with the child AND [2] probable non-urgent symptoms AND [3] unable to complete triage  (NOTE: parent to call back with triage info)    Protocols used: INFORMATION ONLY CALL - NO TRIAGE-P-

## 2019-12-21 NOTE — TELEPHONE ENCOUNTER
Melissa paged me back and I called her.   She has had a lump for about 5-6 weeks.  Her mother had a history of breast cysts and postmenopausal breast cancer.    Diagnostic mammogram is ordered.   She will schedule it at [facility] and follow up with me after.    Pete Cramer MD

## 2019-12-26 ENCOUNTER — HOSPITAL ENCOUNTER (OUTPATIENT)
Dept: MAMMOGRAPHY | Facility: CLINIC | Age: 29
Discharge: HOME | End: 2019-12-26
Attending: OBSTETRICS & GYNECOLOGY | Admitting: OBSTETRICS & GYNECOLOGY
Payer: COMMERCIAL

## 2019-12-26 ENCOUNTER — HOSPITAL ENCOUNTER (OUTPATIENT)
Dept: MAMMOGRAPHY | Facility: CLINIC | Age: 29
End: 2019-12-26
Attending: OBSTETRICS & GYNECOLOGY
Payer: COMMERCIAL

## 2019-12-26 DIAGNOSIS — N63.0 LUMP OR MASS IN BREAST: ICD-10-CM

## 2019-12-26 PROCEDURE — 77066 DX MAMMO INCL CAD BI: CPT

## 2019-12-26 PROCEDURE — 76642 ULTRASOUND BREAST LIMITED: CPT | Mod: RT

## 2019-12-26 PROCEDURE — G0279 TOMOSYNTHESIS, MAMMO: HCPCS

## 2019-12-31 ENCOUNTER — OFFICE VISIT (OUTPATIENT)
Dept: OBGYN | Facility: CLINIC | Age: 29
End: 2019-12-31
Payer: COMMERCIAL

## 2019-12-31 VITALS
DIASTOLIC BLOOD PRESSURE: 78 MMHG | WEIGHT: 198.4 LBS | SYSTOLIC BLOOD PRESSURE: 143 MMHG | HEART RATE: 77 BPM | BODY MASS INDEX: 32.02 KG/M2

## 2019-12-31 DIAGNOSIS — N63.0 LUMP OR MASS IN BREAST: Primary | ICD-10-CM

## 2019-12-31 PROCEDURE — 99213 OFFICE O/P EST LOW 20 MIN: CPT | Performed by: OBSTETRICS & GYNECOLOGY

## 2019-12-31 NOTE — PROGRESS NOTES
Melissa is a 29 year old female, .  She presents today with a breast lump she felt on exam.    She first noted a breast lump around Thanksgiving on the right under the nipple and the areola at the 6 o'clock position.  She doesn't now if she has had it previously.    She tries to do a SBE every few months.    Family history of benign cyst at 28 years old.  Her mother had breast cancer at age 58 or 59.      Went off pills to attempt pregnancy this past summer.  Desires pregnancy.     She had the mammogram and ultrasound of the right breast and the following is noted.       MA DIAGNOSTIC BILATERAL W/ MANDI, US BREAST RIGHT LIMITED 1-3 QUADRANTS- 2019 3:14 PM    HISTORY:  29-year-old with a palpable lump in the right breast under the nipple. Her mother was diagnosed with breast cancer at age 59 or 60.   COMPARISON:  None, baseline   BREAST DENSITY: Heterogeneously dense.  FINDINGS:  A bilateral mammogram with tomosynthesis was obtained. There is no suspicious mammographic finding in either breast.  Ultrasound was targeted to the palpable area of concern at the 6:00 position of the retroareolar right breast. Only normal-appearing fibroglandular tissue is seen in this location. No cyst, mass, or other sonographic abnormality.                                                             IMPRESSION: BI-RADS CATEGORY: 1 -  Negative  RECOMMENDED FOLLOW-UP: Clinical follow-up is requested for the palpable area of concern, which has no corresponding imaging abnormality.    The ultrasound of the right breast was performed   MA DIAGNOSTIC BILATERAL W/ MANDI, US BREAST RIGHT LIMITED 1-3 QUADRANTS- 2019 3:14 PM    HISTORY:  29-year-old with a palpable lump in the right breast under the nipple. Her mother was diagnosed with breast cancer at age 59 or 60.   COMPARISON:  None, baseline   BREAST DENSITY: Heterogeneously dense.  FINDINGS:  A bilateral mammogram with tomosynthesis was obtained. There is no suspicious  mammographic finding in either breast.  Ultrasound was targeted to the palpable area of concern at the 6:00 position of the retroareolar right breast. Only normal-appearing fibroglandular tissue is seen in this location. No cyst, mass, or other sonographic abnormality.                                                                IMPRESSION: BI-RADS CATEGORY: 1 -  Negative  RECOMMENDED FOLLOW-UP: Clinical follow-up is requested for the palpable area of concern, which has no corresponding imaging abnormality.      The patient's medical history, social history and family history have been reviewed and updated as indicated.        Review of Systems:  10 point ROS of systems including Constitutional, Eyes, Respiratory, Cardiovascular, Gastroenterology, Genitourinary, Integumentary, Muscularskeletal, Psychiatric were all negative except for pertinent positives noted in my HPI and in the PMH.      Exam  BP (!) 143/78   Pulse 77   Wt 90 kg (198 lb 6.4 oz)   LMP 12/13/2019 (Exact Date)   Breastfeeding No   BMI 32.02 kg/m    General:  WNWD female, NAD  Alert  Oriented x 3  Gait:  Normal  Skin:  Normal skin turgor  HEENT:  NC/AT, EOMI  Breasts:  Symmetrical, no dimpling seen, small palpable mass at the right areola.    Abdomen:  Non-tender, non-distended.  Pelvic exam:  Not performed  Extremities:  No clubbing, no cyanosis and no edema.      Assessment  Right breast mass      Plan  Radiology does not show an appreciative finding correlating to the mass.   Impression is a category 1.  Should the mass remain or increase in size, then further evaluation should be considered with general surgery.    Questions seem to be answered.     Pete Cramer MD

## 2020-01-10 ENCOUNTER — MYC MEDICAL ADVICE (OUTPATIENT)
Dept: OBGYN | Facility: CLINIC | Age: 30
End: 2020-01-10

## 2020-01-10 DIAGNOSIS — N91.2 ABSENCE OF MENSTRUATION: Primary | ICD-10-CM

## 2020-01-20 DIAGNOSIS — N91.2 ABSENCE OF MENSTRUATION: ICD-10-CM

## 2020-01-20 LAB
FSH SERPL-ACNC: 4.4 IU/L
LH SERPL-ACNC: 6.4 IU/L
PROLACTIN SERPL-MCNC: 16 UG/L (ref 3–27)
TSH SERPL DL<=0.005 MIU/L-ACNC: 1.44 MU/L (ref 0.4–4)

## 2020-01-20 PROCEDURE — 36415 COLL VENOUS BLD VENIPUNCTURE: CPT | Performed by: OBSTETRICS & GYNECOLOGY

## 2020-01-20 PROCEDURE — 84443 ASSAY THYROID STIM HORMONE: CPT | Performed by: OBSTETRICS & GYNECOLOGY

## 2020-01-20 PROCEDURE — 83002 ASSAY OF GONADOTROPIN (LH): CPT | Performed by: OBSTETRICS & GYNECOLOGY

## 2020-01-20 PROCEDURE — 84146 ASSAY OF PROLACTIN: CPT | Performed by: OBSTETRICS & GYNECOLOGY

## 2020-01-20 PROCEDURE — 83001 ASSAY OF GONADOTROPIN (FSH): CPT | Performed by: OBSTETRICS & GYNECOLOGY

## 2020-01-23 ENCOUNTER — OFFICE VISIT (OUTPATIENT)
Dept: OBGYN | Facility: CLINIC | Age: 30
End: 2020-01-23
Payer: COMMERCIAL

## 2020-01-23 VITALS
OXYGEN SATURATION: 100 % | BODY MASS INDEX: 31.93 KG/M2 | WEIGHT: 197.8 LBS | HEART RATE: 78 BPM | SYSTOLIC BLOOD PRESSURE: 119 MMHG | DIASTOLIC BLOOD PRESSURE: 76 MMHG

## 2020-01-23 DIAGNOSIS — N93.9 ABNORMAL UTERINE BLEEDING: Primary | ICD-10-CM

## 2020-01-23 PROCEDURE — 99213 OFFICE O/P EST LOW 20 MIN: CPT | Performed by: OBSTETRICS & GYNECOLOGY

## 2020-01-23 RX ORDER — ACETAMINOPHEN 160 MG
TABLET,DISINTEGRATING ORAL
COMMUNITY
Start: 2019-08-12

## 2020-01-23 NOTE — PROGRESS NOTES
Melissa Macdonald is a 29 year old female .  Last menstrual period 2019.  She presents today with a missed menstruation.  Usually her menstruation cycles are 24 to 26 days.  However currently she is on day 41.  She had some breakthrough bleeding  and .  Those dates for days 24 and day 25 from her last menstrual period in December.  She states that she used 1 liner.  She states that usually with her cycles she will use 3-4 liners a day.  She notes that the bleeding starts lighter, then increases, and then tapers down.  She has not had this previously.  She does not know of any aggravating or alleviating factors.    She had called prior to this appointment to have labs drawn in order to have them ready for the visit today.  The following labs are as noted:    Component      Latest Ref Rng & Units 2020   TSH      0.40 - 4.00 mU/L 1.44   Prolactin      3 - 27 ug/L 16   Lutropin      IU/L 6.4   FSH      IU/L 4.4     Past Medical History:   Diagnosis Date     Family history of breast cancer     mother     History of miscarriage 12/2017    x2     Past Surgical History:   Procedure Laterality Date     HC TOOTH EXTRACTION W/FORCEP          Allergies   Allergen Reactions     Dust Mites      Mold      Current Outpatient Medications   Medication Sig Dispense Refill     ASPIRIN 81 PO        Cholecalciferol (VITAMIN D3) 50 MCG (2000 UT) CAPS        CRANBERRY PO        Prenatal Vit-Fe Fumarate-FA (PRENATAL VITAMIN PO)        Probiotic Product (PROBIOTIC DAILY PO)        nitroFURantoin macrocrystal (MACRODANTIN) 50 MG capsule Take 1 capsule before or after sex (Patient not taking: Reported on 2020) 60 capsule 0     Social History     Socioeconomic History     Marital status:      Spouse name: Chu     Number of children: 0     Years of education: 18     Highest education level: Not on file   Occupational History     Occupation:      Comment: [place] [employer]   Social Needs     Financial resource strain: Not on file     Food insecurity:     Worry: Not on file     Inability: Not on file     Transportation needs:     Medical: Not on file     Non-medical: Not on file   Tobacco Use     Smoking status: Never Smoker     Smokeless tobacco: Never Used   Substance and Sexual Activity     Alcohol use: Yes     Comment: Fewer than 2 drinks per week     Drug use: No     Sexual activity: Yes     Partners: Male   Lifestyle     Physical activity:     Days per week: Not on file     Minutes per session: Not on file     Stress: Not on file   Relationships     Social connections:     Talks on phone: Not on file     Gets together: Not on file     Attends Buddhism service: Not on file     Active member of club or organization: Not on file     Attends meetings of clubs or organizations: Not on file     Relationship status: Not on file     Intimate partner violence:     Fear of current or ex partner: Not on file     Emotionally abused: Not on file     Physically abused: Not on file     Forced sexual activity: Not on file   Other Topics Concern     Parent/sibling w/ CABG, MI or angioplasty before 65F 55M? No   Social History Narrative     Not on file     Family History   Problem Relation Age of Onset     Breast Cancer Mother 55     Obesity Mother      Obesity Father      Depression Father      Hypertension Father      Hyperlipidemia Father      Dementia Maternal Grandmother      Arthritis Paternal Grandmother      Coronary Artery Disease Paternal Grandfather      Coronary Artery Disease Maternal Grandfather      Obesity Brother      Anxiety Disorder Brother        Review of Systems:  10 point ROS of systems including Constitutional, Eyes, Respiratory, Cardiovascular, Gastroenterology, Genitourinary, Integumentary, Muscularskeletal, Psychiatric were all negative except for pertinent positives noted in my HPI and in the PMH.      Exam  /76   Pulse 78   Wt 89.7 kg (197 lb 12.8  oz)   LMP 12/13/2019 (Exact Date)   SpO2 100%   Breastfeeding No   BMI 31.93 kg/m    General:  WNWD female, NAD  Alert  Oriented x 3  Gait:  Normal  Skin:  Normal skin turgor  HEENT:  NC/AT, EOMI  Abdomen:  Non-tender, non-distended.  Pelvic exam:  Not performed  Extremities:  No clubbing, no cyanosis and no edema.      Assessment  Abnormal uterine bleeding      Plan  We reviewed the bleeding and it is possible the light spotting that occurred in early January, it might be her menses.    I suggest to continue to monitor the bleeding and the cycles.  If the bleeding pattern continues, then further evaluation might be indicated.   Questions seem to be answered.     Pete Cramer MD

## 2020-03-01 ENCOUNTER — HEALTH MAINTENANCE LETTER (OUTPATIENT)
Age: 30
End: 2020-03-01

## 2020-03-16 ENCOUNTER — MYC MEDICAL ADVICE (OUTPATIENT)
Dept: OBGYN | Facility: CLINIC | Age: 30
End: 2020-03-16

## 2020-03-16 DIAGNOSIS — N93.9 ABNORMAL UTERINE BLEEDING: ICD-10-CM

## 2020-03-16 DIAGNOSIS — Z30.011 ENCOUNTER FOR PRESCRIPTION OF ORAL CONTRACEPTIVES: Primary | ICD-10-CM

## 2020-03-16 RX ORDER — NORGESTIMATE AND ETHINYL ESTRADIOL 7DAYSX3 LO
1 KIT ORAL DAILY
Qty: 84 TABLET | Refills: 3 | Status: SHIPPED | OUTPATIENT
Start: 2020-03-16 | End: 2020-08-20

## 2020-03-25 ENCOUNTER — MYC MEDICAL ADVICE (OUTPATIENT)
Dept: OBGYN | Facility: CLINIC | Age: 30
End: 2020-03-25

## 2020-03-25 DIAGNOSIS — Z30.011 ENCOUNTER FOR PRESCRIPTION OF ORAL CONTRACEPTIVES: Primary | ICD-10-CM

## 2020-04-22 ENCOUNTER — MYC MEDICAL ADVICE (OUTPATIENT)
Dept: UROLOGY | Facility: CLINIC | Age: 30
End: 2020-04-22

## 2020-04-22 DIAGNOSIS — R30.0 DYSURIA: ICD-10-CM

## 2020-04-22 DIAGNOSIS — R30.0 DYSURIA: Primary | ICD-10-CM

## 2020-04-22 LAB
ALBUMIN UR-MCNC: NEGATIVE MG/DL
APPEARANCE UR: CLEAR
BILIRUB UR QL STRIP: NEGATIVE
COLOR UR AUTO: YELLOW
GLUCOSE UR STRIP-MCNC: NEGATIVE MG/DL
HGB UR QL STRIP: ABNORMAL
KETONES UR STRIP-MCNC: NEGATIVE MG/DL
LEUKOCYTE ESTERASE UR QL STRIP: ABNORMAL
NITRATE UR QL: NEGATIVE
PH UR STRIP: 6.5 PH (ref 5–7)
SOURCE: ABNORMAL
SP GR UR STRIP: 1.01 (ref 1–1.03)
UROBILINOGEN UR STRIP-ACNC: 0.2 EU/DL (ref 0.2–1)

## 2020-04-22 PROCEDURE — 81003 URINALYSIS AUTO W/O SCOPE: CPT | Performed by: UROLOGY

## 2020-04-22 PROCEDURE — 87086 URINE CULTURE/COLONY COUNT: CPT | Performed by: UROLOGY

## 2020-04-22 PROCEDURE — 87088 URINE BACTERIA CULTURE: CPT | Performed by: UROLOGY

## 2020-04-22 PROCEDURE — 87186 SC STD MICRODIL/AGAR DIL: CPT | Performed by: UROLOGY

## 2020-04-22 NOTE — TELEPHONE ENCOUNTER
[facility] Call Center    Phone Message    May a detailed message be left on voicemail: yes     Reason for Call: Symptoms or Concerns     If patient has red-flag symptoms, warm transfer to triage line    Current symptom or concern: UTI symptoms    Symptoms have been present for:  3 day(s)    Has patient previously been seen for this? Yes    By: Dr. Osullivan    Date: Seen 11/13/2019    Are there any new or worsening symptoms? Yes: Pt calling to f/u on [employer] message. She states she had administered an OTC medication yesterday, as she was concerned she had a yeast infection. She notes this morning she notes a burning sensation after urination. Please advise on next steps.    Action Taken: Message routed to:  Other: UA clinical    Travel Screening: Not Applicable

## 2020-04-23 ENCOUNTER — NURSE TRIAGE (OUTPATIENT)
Dept: NURSING | Facility: CLINIC | Age: 30
End: 2020-04-23

## 2020-04-23 LAB
BACTERIA SPEC CULT: ABNORMAL
Lab: ABNORMAL
SPECIMEN SOURCE: ABNORMAL

## 2020-04-23 NOTE — TELEPHONE ENCOUNTER
[facility]: Nurse Triage Note  SITUATION/BACKGROUND                                                      Melissa Macdonald is a 29 year old female who calls with pain and blood in urine    Description:  Burning from urethral opening especially after urinating   Feels like knives poking at her  Onset/duration:  Started Monday  Precip. factors:  Hx recurrent UTI  Associated symptoms:  blood now visible in urine  Improves/worsens symptoms:  Symptoms are worse  Pain scale (0-10)   7/10    MEDICATIONS:   Taking medication(s) as prescribed? Yes  Taking over the counter medication(s?) No  Any barriers to taking medication(s) as prescribed?  No  Medication(s) improving/managing symptoms?  No    Allergies:   Allergies   Allergen Reactions     Dust Mites      Mold        ASSESSMENT      29 year old female with hx recurrent UTI's calls with increased pain and burning at urethral opening   She states it feels like knives are poking her.  She states symptoms started on Monday  She thought she had a yeast infection  Bought an over the counter vaginal suppository.  On Tuesday she realized the pain was increasing with urination and it wasn't a yeast infection   She called in and had a urine culture done yesterday.  Still waiting for results -culture is pending   She has Ceftin at home and is wondering if she could start that   She states she has been drinking a lot of water   Taking cranberry pills.      Will forward to urology-Dr. Osullivan to call back with recommendations     adrielllwforward to   RECOMMENDATION/PLAN                                                      RECOMMENDED DISPOSITION:  See above  Will comply with recommendation: Yes    If further questions/concerns or if symptoms do not improve, worsen or new symptoms develop, call your PCP or [phone] to talk with the Resident on call, as soon as possible.    Guideline used: painful urination  Telephone Triage Protocols for Nurses, Fifth Edition, Rere  Eric Flores, RN, RN

## 2020-04-23 NOTE — TELEPHONE ENCOUNTER
Spoke to patient she is having pain with urination  And now seeing some blood in the urine. Culture still pending. She just  started  AZo for comfort. She has an RX for Ceftin that was given to her by PA for travel if  She gets UTI she will start this  And await the culture .Marcelina Long LPN

## 2020-04-24 DIAGNOSIS — N39.0 URINARY TRACT INFECTION: Primary | ICD-10-CM

## 2020-04-24 RX ORDER — SULFAMETHOXAZOLE/TRIMETHOPRIM 800-160 MG
1 TABLET ORAL 2 TIMES DAILY
Qty: 10 TABLET | Refills: 0 | Status: SHIPPED | OUTPATIENT
Start: 2020-04-24 | End: 2020-08-20

## 2020-05-19 ENCOUNTER — VIRTUAL VISIT (OUTPATIENT)
Dept: UROLOGY | Facility: CLINIC | Age: 30
End: 2020-05-19
Payer: COMMERCIAL

## 2020-05-19 VITALS — BODY MASS INDEX: 31.34 KG/M2 | WEIGHT: 195 LBS | HEIGHT: 66 IN

## 2020-05-19 DIAGNOSIS — N39.0 RECURRENT UTI: Primary | ICD-10-CM

## 2020-05-19 PROCEDURE — 99202 OFFICE O/P NEW SF 15 MIN: CPT | Mod: TEL | Performed by: UROLOGY

## 2020-05-19 ASSESSMENT — MIFFLIN-ST. JEOR: SCORE: 1613.32

## 2020-05-19 NOTE — PROGRESS NOTES
"May 19, 2020    Melissa Macdonald is a 29 year old female who is being evaluated via a billable telephone visit.      The patient has been notified of following:     \"This telephone visit will be conducted via a call between you and your physician/provider. We have found that certain health care needs can be provided without the need for a physical exam.  This service lets us provide the care you need with a short phone conversation.  If a prescription is necessary we can send it directly to your pharmacy.  If lab work is needed we can place an order for that and you can then stop by our lab to have the test done at a later time.    Telephone visits are billed at different rates depending on your insurance coverage. During this emergency period, for some insurers they may be billed the same as an in-person visit.  Please reach out to your insurance provider with any questions.    If during the course of the call the physician/provider feels a telephone visit is not appropriate, you will not be charged for this service.\"    Patient has given verbal consent for Telephone visit?  Yes    What phone number would you like to be contacted at? [phone]    How would you like to obtain your AVS? Adriane    Patient has elected a telephone call for today's follow up.  Called patient today and verified her name and date of birth prior to discussion.    Today's telephone visit is to discuss her history of UTI.  She had been doing well for awhile but recently had a UTI    Had some yeast infections as a child.  First UTI 2010 when she became sexually active and does note that often but not always the infections are related to sexual intercourse    2019 had 3 UTIs in that year and then saw me.  Aft hat time she was put on postcoital antibiotics and was doing well until she stopped the postcoital antibiotics in March.  She is wondering what she can do prevent UTI at this    Patient denies any changes to her past medical, surgical " or social history.  Patient denies any changes to her medications or allergies aside from stopping her OCP    A/P:  Melissa Macdonald is a 29 year old F with history UTIs    Again discussed things for UTI prevention including supplements for both daily prophylaxis or postcoital prophylaxis.    Discussed OTC lubricants she can try for the dryness    Will continue to monitor and she will contact us if she thinks she has an infection    RTC 6 months, sooner if needed    25 minutes were spent in patient care today    Lisa Osullivan MD MPH   of Urology    CC  Patient Care Team:  Darshana Melo APRN CNP as PCP - General (Nurse Practitioner)  Darshana Melo APRN CNP as Assigned PCP

## 2020-05-19 NOTE — PATIENT INSTRUCTIONS
Supplements to prevent UTI to consider  -probiotics  -cranberry   Ellura: [email or web address]   Theracran HP by [employer] [facility] [id number]  -d-mannose  -Vitamin C    Over the counter lubricants astroglide, uberlube    RTC 6 months, sooner if you have more infection [phone]    It was a pleasure meeting with you today.  Thank you for allowing me and my team the privilege of caring for you today.  YOU are the reason we are here, and I truly hope we provided you with the excellent service you deserve.  Please let us know if there is anything else we can do for you so that we can be sure you are leaving completely satisfied with your care experience.

## 2020-08-12 ENCOUNTER — OFFICE VISIT (OUTPATIENT)
Dept: OBGYN | Facility: CLINIC | Age: 30
End: 2020-08-12
Payer: COMMERCIAL

## 2020-08-12 VITALS
OXYGEN SATURATION: 99 % | WEIGHT: 198.6 LBS | DIASTOLIC BLOOD PRESSURE: 83 MMHG | BODY MASS INDEX: 32.55 KG/M2 | HEART RATE: 82 BPM | SYSTOLIC BLOOD PRESSURE: 134 MMHG

## 2020-08-12 DIAGNOSIS — Z01.419 ENCOUNTER FOR GYNECOLOGICAL EXAMINATION WITHOUT ABNORMAL FINDING: Primary | ICD-10-CM

## 2020-08-12 DIAGNOSIS — Z12.4 PAP SMEAR FOR CERVICAL CANCER SCREENING: ICD-10-CM

## 2020-08-12 DIAGNOSIS — Z80.3 FAMILY HISTORY OF MALIGNANT NEOPLASM OF BREAST: ICD-10-CM

## 2020-08-12 PROCEDURE — 87624 HPV HI-RISK TYP POOLED RSLT: CPT | Performed by: OBSTETRICS & GYNECOLOGY

## 2020-08-12 PROCEDURE — G0145 SCR C/V CYTO,THINLAYER,RESCR: HCPCS | Performed by: OBSTETRICS & GYNECOLOGY

## 2020-08-12 PROCEDURE — 99395 PREV VISIT EST AGE 18-39: CPT | Performed by: OBSTETRICS & GYNECOLOGY

## 2020-08-12 NOTE — PROGRESS NOTES
Melissa Macdonald is a 29 year old female , who presents for annual exam.   No unusual bleeding, no incontinence, or unusual discharge.   She is not currently using contraception.  She and her  are ok with the prospect of pregnancy, but are not actively pursuing pregnancy (due to decreased sexual frequency).    Last cholesterol:   Recent Labs   Lab Test 19  0855   CHOL 155   HDL 64   LDL 75   TRIG 50     Past Medical History:   Diagnosis Date     Family history of breast cancer     mother     History of miscarriage 12/2017    x2       Past Surgical History:   Procedure Laterality Date     HC TOOTH EXTRACTION W/FORCEP         OB History    Para Term  AB Living   2 0 0 0 2 0   SAB TAB Ectopic Multiple Live Births   2 0 0 0 0      # Outcome Date GA Lbr Abhishek/2nd Weight Sex Delivery Anes PTL Lv   2 SAB 2018     SAB      1 2017               Gyn History:  Gynecological History         Patient's last menstrual period was 2020 (exact date).     no STD/no PID/no IUD      history of abnormal pap smear:  no  Last pap:   Lab Results   Component Value Date    PAP NIL 10/31/2016           Current Outpatient Medications   Medication Sig Dispense Refill     CRANBERRY PO        ASPIRIN 81 PO        Cholecalciferol (VITAMIN D3) 50 MCG ( UT) CAPS        nitroFURantoin macrocrystal (MACRODANTIN) 50 MG capsule Take 1 capsule before or after sex (Patient not taking: Reported on 2020) 60 capsule 0     norgestim-eth estrad triphasic (ORTHO TRI-CYCLEN LO) 0.18/0.215/0.25 MG-25 MCG tablet Take 1 tablet by mouth daily (Patient not taking: Reported on 2020) 84 tablet 3     norgestrel-ethinyl estradiol (LO/OVRAL) 0.3-30 MG-MCG tablet Take 1 tablet by mouth daily (Patient not taking: Reported on 2020) 84 tablet 3     Prenatal Vit-Fe Fumarate-FA (PRENATAL VITAMIN PO)        Probiotic Product (PROBIOTIC DAILY PO)        sulfamethoxazole-trimethoprim (BACTRIM DS) 800-160 MG tablet  Take 1 tablet by mouth 2 times daily (Patient not taking: Reported on 5/19/2020) 10 tablet 0       Allergies   Allergen Reactions     Dust Mites      Mold        Social History     Socioeconomic History     Marital status:      Spouse name: Chu     Number of children: 0     Years of education: 18     Highest education level: Not on file   Occupational History     Occupation:      Comment: [place] [employer]   Social Needs     Financial resource strain: Not on file     Food insecurity     Worry: Not on file     Inability: Not on file     Transportation needs     Medical: Not on file     Non-medical: Not on file   Tobacco Use     Smoking status: Never Smoker     Smokeless tobacco: Never Used   Substance and Sexual Activity     Alcohol use: Yes     Comment: Fewer than 2 drinks per week     Drug use: No     Sexual activity: Yes     Partners: Male   Lifestyle     Physical activity     Days per week: Not on file     Minutes per session: Not on file     Stress: Not on file   Relationships     Social connections     Talks on phone: Not on file     Gets together: Not on file     Attends Baptism service: Not on file     Active member of club or organization: Not on file     Attends meetings of clubs or organizations: Not on file     Relationship status: Not on file     Intimate partner violence     Fear of current or ex partner: Not on file     Emotionally abused: Not on file     Physically abused: Not on file     Forced sexual activity: Not on file   Other Topics Concern     Parent/sibling w/ CABG, MI or angioplasty before 65F 55M? No   Social History Narrative     Not on file       Family History   Problem Relation Age of Onset     Breast Cancer Mother 55     Obesity Mother      Other - See Comments Mother         6/2019: right parotid gland malignant neoplasm     Obesity Father      Depression Father      Hypertension Father      Hyperlipidemia Father      Dementia Maternal Grandmother       Arthritis Paternal Grandmother      Coronary Artery Disease Paternal Grandfather      Coronary Artery Disease Maternal Grandfather      Obesity Brother      Anxiety Disorder Brother          ROS:  All negative except as above.      EXAM:  /83 (BP Location: Right arm, Cuff Size: Adult Large)   Pulse 82   Wt 90.1 kg (198 lb 9.6 oz)   LMP 07/30/2020 (Exact Date)   SpO2 99%   Breastfeeding No   BMI 32.55 kg/m    General:  WNWD female, NAD  Alert  Oriented x 3  Gait:  Normal  Skin:  Normal skin turgor  Neurologic:  CN grossly intact, good sensation.    HEENT:  NC/AT, EOMI  Neck:  No masses palpated, symmetrical, carotids +2/4, no bruits heard  Heart:  RRR  Lungs:  Clear   Breasts:  Symmetrical, no dimpling noted, no masses palpated, no discharge expressed  Abdomen:  Non-tender, non-distended.  Vulva: No external lesions, normal hair distribution, no adenopathy  BUS:  Normal, no masses noted  Urethra:  No hypermobility noted  Urethral meatus:  No masses noted  Vagina: Moist, pink, no abnormal discharge, well rugated, no lesions  Cervix: Smooth, pink, no visible lesions  Uterus: Normal size, anteverted, non-tender, mobile  Ovaries: No mass, non-tender, mobile  Perianal:  No masses noted.   Extremities:  No clubbing, cyanosis, or edema      ASSESSMENT/PLAN   Annual examination with pap smear  Low fat diet, weight bearing exercises and self breast exams on a monthly basis have been recommended.  I have discussed with patient the risks, benefits, medications, treatment options and modalities.   I have instructed the patient to call or schedule a follow-up appointment if any problems.    Pete Cramer MD

## 2020-08-14 LAB
COPATH REPORT: NORMAL
PAP: NORMAL

## 2020-08-18 LAB
FINAL DIAGNOSIS: NORMAL
HPV HR 12 DNA CVX QL NAA+PROBE: NEGATIVE
HPV16 DNA SPEC QL NAA+PROBE: NEGATIVE
HPV18 DNA SPEC QL NAA+PROBE: NEGATIVE
SPECIMEN DESCRIPTION: NORMAL
SPECIMEN SOURCE CVX/VAG CYTO: NORMAL

## 2020-08-20 ENCOUNTER — VIRTUAL VISIT (OUTPATIENT)
Dept: FAMILY MEDICINE | Facility: CLINIC | Age: 30
End: 2020-08-20
Payer: COMMERCIAL

## 2020-08-20 ENCOUNTER — MYC MEDICAL ADVICE (OUTPATIENT)
Dept: FAMILY MEDICINE | Facility: CLINIC | Age: 30
End: 2020-08-20

## 2020-08-20 VITALS — WEIGHT: 198 LBS | BODY MASS INDEX: 32.99 KG/M2 | HEIGHT: 65 IN

## 2020-08-20 DIAGNOSIS — Z87.59 HISTORY OF MISCARRIAGE: ICD-10-CM

## 2020-08-20 DIAGNOSIS — F34.1 PERSISTENT DEPRESSIVE DISORDER WITH ANXIOUS DISTRESS, CURRENTLY MILD: Primary | ICD-10-CM

## 2020-08-20 PROCEDURE — 99213 OFFICE O/P EST LOW 20 MIN: CPT | Mod: GT | Performed by: NURSE PRACTITIONER

## 2020-08-20 PROCEDURE — 96127 BRIEF EMOTIONAL/BEHAV ASSMT: CPT | Mod: 59 | Performed by: NURSE PRACTITIONER

## 2020-08-20 RX ORDER — LORATADINE 10 MG/1
10 TABLET ORAL DAILY
COMMUNITY

## 2020-08-20 ASSESSMENT — ANXIETY QUESTIONNAIRES
1. FEELING NERVOUS, ANXIOUS, OR ON EDGE: MORE THAN HALF THE DAYS
5. BEING SO RESTLESS THAT IT IS HARD TO SIT STILL: NOT AT ALL
GAD7 TOTAL SCORE: 8
7. FEELING AFRAID AS IF SOMETHING AWFUL MIGHT HAPPEN: MORE THAN HALF THE DAYS
3. WORRYING TOO MUCH ABOUT DIFFERENT THINGS: SEVERAL DAYS
6. BECOMING EASILY ANNOYED OR IRRITABLE: SEVERAL DAYS
IF YOU CHECKED OFF ANY PROBLEMS ON THIS QUESTIONNAIRE, HOW DIFFICULT HAVE THESE PROBLEMS MADE IT FOR YOU TO DO YOUR WORK, TAKE CARE OF THINGS AT HOME, OR GET ALONG WITH OTHER PEOPLE: VERY DIFFICULT
2. NOT BEING ABLE TO STOP OR CONTROL WORRYING: SEVERAL DAYS

## 2020-08-20 ASSESSMENT — MIFFLIN-ST. JEOR: SCORE: 1619

## 2020-08-20 ASSESSMENT — PATIENT HEALTH QUESTIONNAIRE - PHQ9
5. POOR APPETITE OR OVEREATING: SEVERAL DAYS
SUM OF ALL RESPONSES TO PHQ QUESTIONS 1-9: 8

## 2020-08-20 NOTE — PATIENT INSTRUCTIONS
Patient Education     Depression  Depression is one of the most common mental health problems today. It is not just a state of unhappiness or sadness. It is a true disease. The cause seems to be related to a decrease in chemicals that transmit signals in the brain. Having a family history of depression, alcoholism, or suicide increases the risk. Chronic illness, chronic pain, migraine headaches, and high emotional stress also increase the risk.  Depression is something we tend to recognize in others, but may have a hard time seeing in ourselves. It can show in many physical and emotional ways:    Loss of appetite    Overeating    Not being able to sleep    Sleeping too much    Tiredness not related to physical exertion    Restlessness or irritability    Slowness of movement or speech    Feeling depressed or withdrawn    Loss of interest in things you once enjoyed    Trouble concentrating, poor memory, trouble making decisions    Thoughts of harming or killing oneself, or thoughts that life is not worth living    Low self-esteem  The treatment for depression may include both medicine and psychotherapy. Antidepressants can reduce suffering and can improve the ability to function during the depressed period. Therapy can offer emotional support and help you understand emotional factors that may be causing the depression.  Home care    Ongoing care and support help people manage this disease. Find a healthcare provider and therapist who meet your needs. Seek help when you feel like you may be getting ill.    Be kind to yourself. Make it a point to do things that you enjoy (gardening, walking in nature, going to a movie). Reward yourself for small successes.    Take care of your physical body. Eat a balanced diet (low in saturated fat and high in fruits and vegetables). Exercise at least 3 times a week for 30 minutes. Even mild-moderate exercise (like brisk walking) can make you feel better.    Don't drink alcohol, which  can make depression worse.    Take medicine as prescribed.    Tell each of your healthcare providers about all of the prescription and over-the-counter medicines, vitamins, and supplements you take. Certain supplements interact with medicines and can result in dangerous side effects. Ask your pharmacist when you have questions about medicine interactions.    Talk with your family and trusted friends about your feelings and thoughts. Ask them to help you recognize behavior changes early so you can get help and, if needed, medicine can be adjusted.    Follow-up care  Follow up with your healthcare provider, or as advised.  Call 911  Call 911 if you:    Have suicidal thoughts, a suicide plan, and the means to carry out the plan; or serious thoughts of hurting someone else     Have trouble breathing    Are very confused    Feel very drowsy or have trouble awakening    Faint or lose consciousness    Have new chest pain that becomes more severe, lasts longer, or spreads into your shoulder, arm, neck, jaw, or back  When to seek medical advice  Call your healthcare provider right away if any of these happen:    Feeling extreme depression, fear, anxiety, or anger toward yourself or others    Feeling out of control    Feeling that you may try to harm yourself or another    Hearing voices that others do not hear    Seeing things that others do not see    Can t sleep or eat for 3 days in a row    Friends or family express concern over your behavior and ask you to seek help  Date Last Reviewed: 10/1/2017    4845-1381 [employer]. [address] [place] [address], [place] [address]. All rights reserved. This information is not intended as a substitute for professional medical care. Always follow your healthcare professional's instructions.

## 2020-08-20 NOTE — PROGRESS NOTES
"Melissa Macdonald is a 29 year old female who is being evaluated via a billable video visit.      The patient has been notified of following:     \"This video visit will be conducted via a call between you and your physician/provider. We have found that certain health care needs can be provided without the need for an in-person physical exam.  This service lets us provide the care you need with a video conversation.  If a prescription is necessary we can send it directly to your pharmacy.  If lab work is needed we can place an order for that and you can then stop by our lab to have the test done at a later time.    Video visits are billed at different rates depending on your insurance coverage.  Please reach out to your insurance provider with any questions.    If during the course of the call the physician/provider feels a video visit is not appropriate, you will not be charged for this service.\"    Patient has given verbal consent for Video visit? Yes  How would you like to obtain your AVS? MyChart  If you are dropped from the video visit, the video invite should be resent to: Text to cell phone: [phone]   Will anyone else be joining your video visit? No    Subjective     Melissa Macdonald is a 29 year old female who presents today via video visit for the following health issues:    HPI    Chief Complaint   Patient presents with     Medication Request     depression     Anxiety     start medication       Video Start Time: 1:16 PM      Melissa has had intermittent problems with anxiety and depression since 2010.  In 2016 she started therapy and it helped a little.  In 2018, she had 2 miscarriages and she connected with a therapist that has been very helpful.  Recently, she is having worsening mood issues again.  She continues with therapy and now she is wondering if she should be on medications.    Depression.  Low motivation, fatigue.  Anxiety.  Sleeping okay at night.    [employer].  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  She is considering a job change.     Review of Systems   Constitutional, HEENT, cardiovascular, pulmonary, GI, , musculoskeletal, neuro, skin, endocrine and psych systems are negative, except as otherwise noted.      Objective         Vitals:  No vitals were obtained today due to virtual visit.    Physical Exam     GENERAL: Healthy, alert and no distress  EYES: Eyes grossly normal to inspection.  No discharge or erythema, or obvious scleral/conjunctival abnormalities.  RESP: No audible wheeze, cough, or visible cyanosis.  No visible retractions or increased work of breathing.    SKIN: Visible skin clear. No significant rash, abnormal pigmentation or lesions.  NEURO: Cranial nerves grossly intact.  Mentation and speech appropriate for age.  PSYCH: Mentation appears normal, affect normal/bright, judgement and insight intact, normal speech and appearance well-groomed.    Diagnostics:  Reviewed in epic        Assessment:  (F34.1) Persistent depressive disorder with anxious distress, currently mild  (primary encounter diagnosis)  Comment: worse  Plan: sertraline (ZOLOFT) 50 MG tablet        Reviewed different antidepressants, their indications and reasons for use. Will start at a low dose of sertraline and anticipate that we may need to titrate up. Instructed pt to return to the clinic in 1-2 months for a med recheck/refills, sooner if having issues or concerns.  The side effect profile was discussed witht he patient.  She is to continue psychotherapy and healthy self cares.  She is to follow up sooner if any problems or concerns.      (Z87.59) History of miscarriage  Comment:   Plan: continue care with OB/GYN  I did tell her that zoloft is a pregnancy category C in the event that she gets pregnant.        Video-Visit Details    Type of service:  Video Visit    Video End Time:1:33 PM    Originating Location (pt. Location): Home    Distant Location  (provider location):  [facility]     Platform used for Video Visit: Isela

## 2020-08-21 ASSESSMENT — ANXIETY QUESTIONNAIRES: GAD7 TOTAL SCORE: 8

## 2020-09-24 ENCOUNTER — VIRTUAL VISIT (OUTPATIENT)
Dept: FAMILY MEDICINE | Facility: CLINIC | Age: 30
End: 2020-09-24
Payer: COMMERCIAL

## 2020-09-24 DIAGNOSIS — F32.0 MILD MAJOR DEPRESSION (H): ICD-10-CM

## 2020-09-24 PROCEDURE — 96127 BRIEF EMOTIONAL/BEHAV ASSMT: CPT | Performed by: NURSE PRACTITIONER

## 2020-09-24 PROCEDURE — 99213 OFFICE O/P EST LOW 20 MIN: CPT | Mod: GT | Performed by: NURSE PRACTITIONER

## 2020-09-24 ASSESSMENT — PATIENT HEALTH QUESTIONNAIRE - PHQ9: SUM OF ALL RESPONSES TO PHQ QUESTIONS 1-9: 4

## 2020-09-24 NOTE — PROGRESS NOTES
"Melissa Macdonald is a 30 year old female who is being evaluated via a billable video visit.      The patient has been notified of following:     \"This video visit will be conducted via a call between you and your physician/provider. We have found that certain health care needs can be provided without the need for an in-person physical exam.  This service lets us provide the care you need with a video conversation.  If a prescription is necessary we can send it directly to your pharmacy.  If lab work is needed we can place an order for that and you can then stop by our lab to have the test done at a later time.    Video visits are billed at different rates depending on your insurance coverage.  Please reach out to your insurance provider with any questions.    If during the course of the call the physician/provider feels a video visit is not appropriate, you will not be charged for this service.\"    Patient has given verbal consent for Video visit? Yes  How would you like to obtain your AVS? MyChart  If you are dropped from the video visit, the video invite should be resent to: Text to cell phone: [phone]  Will anyone else be joining your video visit? No    Subjective     Melissa Macdonald is a 30 year old female who presents today via video visit for the following health issues:    HPI    Depression Followup    How are you doing with your depression since your last visit? Improved     Are you having other symptoms that might be associated with depression? No    Have you had a significant life event?  No     Are you feeling anxious or having panic attacks?   No    Do you have any concerns with your use of alcohol or other drugs? No      Melissa was started on sertraline 5 weeks ago for her mood.  She felt better the first week, \"but I think that was a placebo effect.\"  Since then, she is feeling better overall.  She is more motivated to get tasks down.  Her anxiety has lessened.  Overall she is feeling much " "better.  Her energy is better.  \"it feels good to get tasks done.\"  Less tearful and she likes this.      In counseling every 2-3 weeks and she has the ability to go more often if she is feeling worse.    Doing some \"healthy habits.\"  Eating healthier.    Her dog adds an element of therapy.  \"Sanjuana\", corgi    History of SAD.  She is planning to use a SAD light soon.    Vitamin D:  2000 I U vitamin D     Social History     Tobacco Use     Smoking status: Never Smoker     Smokeless tobacco: Never Used   Substance Use Topics     Alcohol use: Yes     Comment: Fewer than 2 drinks per week     Drug use: No     PHQ 10/11/2018 8/20/2020 9/24/2020   PHQ-9 Total Score 4 8 4   Q9: Thoughts of better off dead/self-harm past 2 weeks Not at all Not at all Not at all     VICKY-7 SCORE 5/29/2018 10/11/2018 8/20/2020   Total Score 4 11 8       Video Start Time: 9:00 AM    Review of Systems   CONSTITUTIONAL: NEGATIVE for fever, chills, change in weight  CONSTITUTIONAL:NEGATIVE for fever, chills, change in weight  ENT/MOUTH: NEGATIVE for ear, mouth and throat problems  RESP: NEGATIVE for significant cough or SOB  CV: NEGATIVE for chest pain, palpitations or peripheral edema      Objective         Vitals:  No vitals were obtained today due to virtual visit.    Physical Exam     GENERAL: Healthy, alert and no distress  EYES: Eyes grossly normal to inspection.  No discharge or erythema, or obvious scleral/conjunctival abnormalities.  RESP: No audible wheeze, cough, or visible cyanosis.  No visible retractions or increased work of breathing.    SKIN: Visible skin clear. No significant rash, abnormal pigmentation or lesions.  NEURO: Cranial nerves grossly intact.  Mentation and speech appropriate for age.  PSYCH: Mentation appears normal, affect normal/bright, judgement and insight intact, normal speech and appearance well-groomed.          Assessment & Plan     (F32.0) Mild major depression (H)  Comment: improved  Plan: sertraline (ZOLOFT) " "50 MG tablet          Today, Melissa is feeling much better and she is very happy with her sertraline.  I did go ahead and refill the medication for 6-month supply.    I encouraged her to continue to take her medication every day as prescribed.  I also encouraged her to continue psychotherapy.  She is to continue healthy diet, more aerobic activity, and doing tasks give her vanesa.  I will plan to see her for a video visit/medication recheck appointment in 6 months, sooner problems.  For this winter, she is to continue her vitamin D and the sad light.     Follow-up with me anytime if problems.    BMI:   Estimated body mass index is 32.95 kg/m  as calculated from the following:    Height as of 8/20/20: 1.651 m (5' 5\").    Weight as of 8/20/20: 89.8 kg (198 lb).   Weight management plan: Discussed healthy diet and exercise guidelines        See Patient Instructions    No follow-ups on file.    SUSANA Kim CNP  [facility]      Video-Visit Details    Type of service:  Video Visit    Video End Time:9:14 AM    Originating Location (pt. Location): Home    Distant Location (provider location):  [facility]     Platform used for Video Visit: Isela        "

## 2020-11-18 ENCOUNTER — MYC MEDICAL ADVICE (OUTPATIENT)
Dept: FAMILY MEDICINE | Facility: CLINIC | Age: 30
End: 2020-11-18

## 2020-11-19 ENCOUNTER — VIRTUAL VISIT (OUTPATIENT)
Dept: FAMILY MEDICINE | Facility: CLINIC | Age: 30
End: 2020-11-19
Payer: COMMERCIAL

## 2020-11-19 DIAGNOSIS — R19.7 DIARRHEA, UNSPECIFIED TYPE: Primary | ICD-10-CM

## 2020-11-19 PROCEDURE — 99214 OFFICE O/P EST MOD 30 MIN: CPT | Mod: GT | Performed by: NURSE PRACTITIONER

## 2020-11-19 ASSESSMENT — PATIENT HEALTH QUESTIONNAIRE - PHQ9
SUM OF ALL RESPONSES TO PHQ QUESTIONS 1-9: 2
10. IF YOU CHECKED OFF ANY PROBLEMS, HOW DIFFICULT HAVE THESE PROBLEMS MADE IT FOR YOU TO DO YOUR WORK, TAKE CARE OF THINGS AT HOME, OR GET ALONG WITH OTHER PEOPLE: NOT DIFFICULT AT ALL
SUM OF ALL RESPONSES TO PHQ QUESTIONS 1-9: 2

## 2020-11-19 ASSESSMENT — ANXIETY QUESTIONNAIRES
6. BECOMING EASILY ANNOYED OR IRRITABLE: NOT AT ALL
7. FEELING AFRAID AS IF SOMETHING AWFUL MIGHT HAPPEN: SEVERAL DAYS
GAD7 TOTAL SCORE: 2
7. FEELING AFRAID AS IF SOMETHING AWFUL MIGHT HAPPEN: SEVERAL DAYS
2. NOT BEING ABLE TO STOP OR CONTROL WORRYING: NOT AT ALL
GAD7 TOTAL SCORE: 2
5. BEING SO RESTLESS THAT IT IS HARD TO SIT STILL: NOT AT ALL
1. FEELING NERVOUS, ANXIOUS, OR ON EDGE: NOT AT ALL
3. WORRYING TOO MUCH ABOUT DIFFERENT THINGS: SEVERAL DAYS
4. TROUBLE RELAXING: NOT AT ALL
GAD7 TOTAL SCORE: 2

## 2020-11-19 NOTE — PROGRESS NOTES
"Melissa Macdonald is a 30 year old female who is being evaluated via a billable video visit.      The patient has been notified of following:     \"This video visit will be conducted via a call between you and your physician/provider. We have found that certain health care needs can be provided without the need for an in-person physical exam.  This service lets us provide the care you need with a video conversation.  If a prescription is necessary we can send it directly to your pharmacy.  If lab work is needed we can place an order for that and you can then stop by our lab to have the test done at a later time.    Video visits are billed at different rates depending on your insurance coverage.  Please reach out to your insurance provider with any questions.    If during the course of the call the physician/provider feels a video visit is not appropriate, you will not be charged for this service.\"    Patient has given verbal consent for Video visit? Yes  How would you like to obtain your AVS? MyChart  If you are dropped from the video visit, the video invite should be resent to: Send to e-mail at: [email or web address]  Will anyone else be joining your video visit? No    Subjective     Melissa Macdonald is a 30 year old female who presents today via video visit for the following health issues:    HPI     Chief Complaint   Patient presents with     Gastric Problem     Diarrhea     2 weeks     Hugh has been having problems with diarrhea.  In the mornings she will have loose stools through the mid morning and afternoon.  In the evening the stooling is less.  She is wondering if this is related to her sertraline (she started in September).  The sertraline has been very helpful with her mood.  No blood in her stool.  No abd pain.  \"it is clear that everything moving through too fast.\"  She has not had any antibioitcs, no travel.  No fever, chills, illness.    No known covid symptoms.  She is maintaining covid " precautions: working from home, isolating, etc.  No other symptoms of covid.    She stopped cofffee today and she thinks her stools are better today.    Sertraline:  She takes in the early morning.    Diet diary:  She has started.    Video Start Time: 2:02 PM    Review of Systems   Constitutional, HEENT, cardiovascular, pulmonary, GI, , musculoskeletal, neuro, skin, endocrine and psych systems are negative, except as otherwise noted.      Objective         Vitals:  No vitals were obtained today due to virtual visit.    Physical Exam     GENERAL: Healthy, alert and no distress  EYES: Eyes grossly normal to inspection.  No discharge or erythema, or obvious scleral/conjunctival abnormalities.  RESP: No audible wheeze, cough, or visible cyanosis.  No visible retractions or increased work of breathing.    SKIN: Visible skin clear. No significant rash, abnormal pigmentation or lesions.  NEURO: Cranial nerves grossly intact.  Mentation and speech appropriate for age.  PSYCH: Mentation appears normal, affect normal/bright, judgement and insight intact, normal speech and appearance well-groomed.      Diagnostics:  Reviewed in epic.          Assessment & Plan     (R19.7) Diarrhea, unspecified type  (primary encounter diagnosis)  Comment: Acute  Plan: For today, I reassured Melissa that this sounds like more of a nuisance diarrhea.  I encouraged her to keep a diet diary and keep track of the foods that she feels cause diarrhea.  I did encourage her to eat a bratty diet and implement probiotics into her diet.  Florajen3 or Culturelle were recommended.  In the event that her diarrhea stools do not improve with dietary modification and probiotics, she will let me know and I would give her a referral to gastroenterology.  In the meantime I did instruct her to avoid foods that irritate her stomach and cause diarrhea, get plenty of fiber and drink extra water.  She will let me know if there is anything else she needs, otherwise I  will plan that this is resolving on its own.          See Patient Instructions    Return in about 4 weeks (around 12/17/2020) for Follow up regarding today's concerns.    SUSANA Kim CNP  [facility]      Video-Visit Details    Type of service:  Video Visit    Video End Time:2:28pm    Originating Location (pt. Location): Home    Distant Location (provider location):  [facility]     Platform used for Video Visit: Optoro          Answers for HPI/ROS submitted by the patient on 11/19/2020   If you checked off any problems, how difficult have these problems made it for you to do your work, take care of things at home, or get along with other people?: Not difficult at all  PHQ9 TOTAL SCORE: 2  VICKY 7 TOTAL SCORE: 2

## 2020-11-19 NOTE — PATIENT INSTRUCTIONS
Patient Education     Diarrhea with Uncertain Cause (Adult)    Diarrhea is when stools are loose and watery. This can be caused by:    Viral infections    Bacterial infections    Food poisoning    Parasites    Irritable bowel syndrome (IBS)    Inflammatory bowel diseases such as ulcerative colitis, Crohn's disease, and celiac disease    Food intolerance, such as to lactose, the sugar found in milk and milk products    Reaction to medicines like antibiotics, laxatives, cancer drugs, and antacids  Along with diarrhea, you may also have:    Abdominal pain and cramping    Nausea and vomiting    Loss of bowel control    Fever and chills    Bloody stools  In some cases, antibiotics may help to treat diarrhea. You may have a stool sample test. This is done to see what is causing your diarrhea, and if antibiotics will help treat it. The results of a stool sample test may take up to 2 days. The healthcare provider may not give you antibiotics until he or she has the stool test results.  Diarrhea can cause dehydration. This is the loss of too much water and other fluids from the body. When this occurs, body fluid must be replaced. This can be done with oral rehydration solutions. Oral rehydration solutions are available at drugstores and grocery stores without a prescription. Sports drinks are not the best choice if you are very dehydrated. They have too much sugar and not enough electrolytes.  Home care  Follow all instructions given by your healthcare provider. Rest at home for the next 24 hours, or until you feel better. Avoid caffeine, tobacco, and alcohol. These can make diarrhea, cramping, and pain worse.  If taking medicines:    Over-the-counter nausea and diarrhea medicines are generally OK unless you experience fever or blood stool. Check with your doctor first in those circumstances.    You may use acetaminophen or NSAID medicines like ibuprofen or naproxen to reduce pain and fever. Don t use these if you have  chronic liver or kidney disease, or ever had a stomach ulcer or gastrointestinal bleeding. Don't use NSAID medicines if you are already taking one for another condition (like arthritis) or are on daily aspirin therapy (such as for heart disease or after a stroke). Talk with your healthcare provider first.    If antibiotics were prescribed, be sure you take them until they are finished. Don t stop taking them even when you feel better. Antibiotics must be taken as a full course.  To prevent the spread of illness:    Remember that washing with soap and water and using alcohol-based  is the best way to prevent the spread of infection. Dry your hands with a single use towel (like a paper towel).    Clean the toilet after each use.    Wash your hands before eating.    Wash your hands before and after preparing food. Keep in mind that people with diarrhea or vomiting should not prepare food for others.    Wash your hands after using cutting boards, countertops, and knives that have been in contact with raw foods.    Wash and then peel fruits and vegetables.    Keep uncooked meats away from cooked and ready-to-eat foods.    Use a food thermometer when cooking. Cook poultry to at least 165 F (74 C). Cook ground meat (beef, veal, pork, lamb) to at least 160 F (71 C). Cook fresh beef, veal, lamb, and pork to at least 145 F (63 C).    Don t eat raw or undercooked eggs (poached or deysi side up), poultry, meat, or unpasteurized milk and juices.  Food and drinks  The main goal while treating vomiting or diarrhea is to prevent dehydration. This is done by taking small amounts of liquids often.    Keep in mind that liquids are more important than food right now.    Drink only small amounts of liquids at a time.    Don t force yourself to eat, especially if you are having cramping, vomiting, or diarrhea. Don t eat large amounts at a time, even if you are hungry.    If you eat, avoid fatty, greasy, spicy, or fried  foods.    Don t eat dairy foods or drink milk if you have diarrhea. These can make diarrhea worse.  During the first 24 hours you can try:    Oral rehydration solutions.  Sports drinks may be used if you are not too dehydrated and are otherwise healthy.    Soft drinks without caffeine    Ginger ale    Water (plain or flavored)    Decaf tea or coffee    Clear broth, consommé, or bouillon    Gelatin, popsicles, or frozen fruit juice bars  The second 24 hours, if you are feeling better, you can add:    Hot cereal, plain toast, bread, rolls, or crackers    Plain noodles, rice, mashed potatoes, chicken noodle soup, or rice soup    Unsweetened canned fruit (no pineapple)    Bananas  As you recover:    Limit fat intake to less than 15 grams per day. Don t eat margarine, butter, oils, mayonnaise, sauces, gravies, fried foods, peanut butter, meat, poultry, or fish.    Limit fiber. Don t eat raw or cooked vegetables, fresh fruits except bananas, or bran cereals.    Limit caffeine and chocolate.    Limit dairy.    Don t use spices or seasonings except salt.    Go back to your normal diet over time, as you feel better and your symptoms improve.    If the symptoms come back, go back to a simple diet or clear liquids.  Follow-up care  Follow up with your healthcare provider, or as advised. If a stool sample was taken or cultures were done, call the healthcare provider for the results as instructed.  Call 911  Call 911 if you have any of these symptoms:    Trouble breathing    Confusion    Extreme drowsiness or trouble walking    Loss of consciousness    Rapid heart rate    Chest pain    Stiff neck    Seizure  When to seek medical advice  Call your healthcare provider right away if any of these occur:    Abdominal pain that gets worse    Constant lower right abdominal pain    Continued vomiting and inability to keep liquids down    Diarrhea more than 5 times a day    Blood in vomit or stool    Dark urine or no urine for 8 hours,  dry mouth and tongue, tiredness, weakness, or dizziness    Drowsiness    New rash    You don t get better in 2 to 3 days    Fever of 100.4 F (38 C) or higher, or as directed by your healthcare provider  Jay last reviewed this educational content on 6/1/2018 2000-2020 [employer]. [address] [place] [id number]. All rights reserved. This information is not intended as a substitute for professional medical care. Always follow your healthcare professional's instructions.

## 2020-11-20 ASSESSMENT — ANXIETY QUESTIONNAIRES: GAD7 TOTAL SCORE: 2

## 2020-11-20 ASSESSMENT — PATIENT HEALTH QUESTIONNAIRE - PHQ9: SUM OF ALL RESPONSES TO PHQ QUESTIONS 1-9: 2

## 2020-12-22 ENCOUNTER — VIRTUAL VISIT (OUTPATIENT)
Dept: OBGYN | Facility: CLINIC | Age: 30
End: 2020-12-22
Payer: COMMERCIAL

## 2020-12-22 DIAGNOSIS — N92.0 EXCESSIVE OR FREQUENT MENSTRUATION: ICD-10-CM

## 2020-12-22 DIAGNOSIS — N92.6 MENSTRUAL ABNORMALITY: Primary | ICD-10-CM

## 2020-12-22 PROCEDURE — 99214 OFFICE O/P EST MOD 30 MIN: CPT | Mod: TEL | Performed by: OBSTETRICS & GYNECOLOGY

## 2020-12-22 NOTE — PROGRESS NOTES
"Melissa Macdonald is a 30 year old female who is being evaluated via a billable telephone visit.      The patient has been notified of following:     \"This telephone visit will be conducted via a call between you and your physician/provider. We have found that certain health care needs can be provided without the need for a physical exam.  This service lets us provide the care you need with a short phone conversation.  If a prescription is necessary we can send it directly to your pharmacy.  If lab work is needed we can place an order for that and you can then stop by our lab to have the test done at a later time.    Telephone visits are billed at different rates depending on your insurance coverage. During this emergency period, for some insurers they may be billed the same as an in-person visit.  Please reach out to your insurance provider with any questions.    If during the course of the call the physician/provider feels a telephone visit is not appropriate, you will not be charged for this service.\"    Patient has given verbal consent for Telephone visit?  Yes    What phone number would you like to be contacted at?  [phone]    How would you like to obtain your AVS? Adriane Torres is a 30 year old female, .  She and her  have decided to proceed with actively attempting pregnancy.    She reports that her cycle lengths are now between 22 and 25 days, with her follicular phase at 8 days, provided the luteal phase is 14 days constant which lately has been questioned.   She has only been off the OCPs for 4 months, so this might change with further observation.   D&C for menstrual regulation is probably not needed at this time and works for approximately two cycles..  In addition she has been off the birth control pills for four cycles and at this time I would recommend to continue with observation to see if cycles lengthen.  Melissa has also been using Zoloft since late August for mood changes.  " She and her  have been overall she has been feeling much better and motivated.  Her anxiety is less and her energy is more.  She is desiring to proceed with continued use.  We discussed the use of Zoloft in pregnancy.  We also reviewed the literature that shows no evidence of increase in birth defects above the 1 ANYTHING else okay 3% normally seen in the general population.  As usual, if the benefits outweigh the risks then continued use is okay.  We also discussed the Covid-19 virus as well as the vaccines.  At this time the messenger RNA vaccines are available.  Other vaccines will become available.  We reviewed the known risks with the Covid virus versus the risks and benefits of the vaccine.  At this time it is thought that the vaccine benefits far outweigh theoretical risks.    The medical history social history and family history are reviewed, and no updates needed at this time.    Review of Systems:  10 point ROS of systems including Constitutional, Eyes, Respiratory, Cardiovascular, Gastroenterology, Genitourinary, Integumentary, Muscularskeletal, Psychiatric were all negative except for pertinent positives noted in my HPI and in the PMH.    Exam  Not able to be performed today    Assessment  Frequent menses, not polymenorrhea  Mild major depression with anxiety   COVID questions    Plan  At this time I suggest to continue to monitor the cycles.  At this time I do not suggest intervention yet.    OK to continue with the Zoloft.   OK for the COVID vaccine when available for her risk group.     Phone call duration: 29.30 minutes    Pete Cramer MD

## 2021-04-07 ENCOUNTER — OFFICE VISIT (OUTPATIENT)
Dept: FAMILY MEDICINE | Facility: CLINIC | Age: 31
End: 2021-04-07
Payer: COMMERCIAL

## 2021-04-07 VITALS
HEIGHT: 65 IN | DIASTOLIC BLOOD PRESSURE: 70 MMHG | TEMPERATURE: 96.7 F | OXYGEN SATURATION: 98 % | WEIGHT: 203 LBS | RESPIRATION RATE: 16 BRPM | SYSTOLIC BLOOD PRESSURE: 130 MMHG | BODY MASS INDEX: 33.82 KG/M2 | HEART RATE: 59 BPM

## 2021-04-07 DIAGNOSIS — Z13.220 SCREENING FOR HYPERLIPIDEMIA: ICD-10-CM

## 2021-04-07 DIAGNOSIS — Z13.21 ENCOUNTER FOR VITAMIN DEFICIENCY SCREENING: ICD-10-CM

## 2021-04-07 DIAGNOSIS — Z13.1 SCREENING FOR DIABETES MELLITUS: ICD-10-CM

## 2021-04-07 DIAGNOSIS — F32.0 MILD MAJOR DEPRESSION (H): Primary | ICD-10-CM

## 2021-04-07 DIAGNOSIS — E66.811 OBESITY (BMI 30.0-34.9): ICD-10-CM

## 2021-04-07 DIAGNOSIS — Z13.0 SCREENING FOR IRON DEFICIENCY ANEMIA: ICD-10-CM

## 2021-04-07 DIAGNOSIS — Z13.29 SCREENING FOR THYROID DISORDER: ICD-10-CM

## 2021-04-07 LAB
ALBUMIN SERPL-MCNC: 4.1 G/DL (ref 3.4–5)
ALP SERPL-CCNC: 80 U/L (ref 40–150)
ALT SERPL W P-5'-P-CCNC: 18 U/L (ref 0–50)
ANION GAP SERPL CALCULATED.3IONS-SCNC: 8 MMOL/L (ref 3–14)
AST SERPL W P-5'-P-CCNC: 13 U/L (ref 0–45)
BILIRUB SERPL-MCNC: 0.4 MG/DL (ref 0.2–1.3)
BUN SERPL-MCNC: 12 MG/DL (ref 7–30)
CALCIUM SERPL-MCNC: 9.4 MG/DL (ref 8.5–10.1)
CHLORIDE SERPL-SCNC: 106 MMOL/L (ref 94–109)
CHOLEST SERPL-MCNC: 152 MG/DL
CO2 SERPL-SCNC: 27 MMOL/L (ref 20–32)
CREAT SERPL-MCNC: 0.78 MG/DL (ref 0.52–1.04)
DEPRECATED CALCIDIOL+CALCIFEROL SERPL-MC: 35 UG/L (ref 20–75)
GFR SERPL CREATININE-BSD FRML MDRD: >90 ML/MIN/{1.73_M2}
GLUCOSE SERPL-MCNC: 78 MG/DL (ref 70–99)
HDLC SERPL-MCNC: 64 MG/DL
HGB BLD-MCNC: 14.6 G/DL (ref 11.7–15.7)
LDLC SERPL CALC-MCNC: 79 MG/DL
NONHDLC SERPL-MCNC: 88 MG/DL
POTASSIUM SERPL-SCNC: 4.4 MMOL/L (ref 3.4–5.3)
PROT SERPL-MCNC: 7.5 G/DL (ref 6.8–8.8)
SODIUM SERPL-SCNC: 141 MMOL/L (ref 133–144)
TRIGL SERPL-MCNC: 43 MG/DL
TSH SERPL DL<=0.005 MIU/L-ACNC: 1.56 MU/L (ref 0.4–4)

## 2021-04-07 PROCEDURE — 82306 VITAMIN D 25 HYDROXY: CPT | Performed by: NURSE PRACTITIONER

## 2021-04-07 PROCEDURE — 80053 COMPREHEN METABOLIC PANEL: CPT | Performed by: NURSE PRACTITIONER

## 2021-04-07 PROCEDURE — 99214 OFFICE O/P EST MOD 30 MIN: CPT | Performed by: NURSE PRACTITIONER

## 2021-04-07 PROCEDURE — 80061 LIPID PANEL: CPT | Performed by: NURSE PRACTITIONER

## 2021-04-07 PROCEDURE — 36415 COLL VENOUS BLD VENIPUNCTURE: CPT | Performed by: NURSE PRACTITIONER

## 2021-04-07 PROCEDURE — 84443 ASSAY THYROID STIM HORMONE: CPT | Performed by: NURSE PRACTITIONER

## 2021-04-07 PROCEDURE — 85018 HEMOGLOBIN: CPT | Performed by: NURSE PRACTITIONER

## 2021-04-07 RX ORDER — LANOLIN ALCOHOL/MO/W.PET/CERES
CREAM (GRAM) TOPICAL
COMMUNITY
Start: 2020-12-01 | End: 2021-09-01

## 2021-04-07 ASSESSMENT — MIFFLIN-ST. JEOR: SCORE: 1636.68

## 2021-04-07 NOTE — PATIENT INSTRUCTIONS
Patient Education     Losing Weight for Heart Health  Excess weight is a major risk factor for heart disease. Losing weight has many benefits including lowering your blood pressure, improving your cholesterol level, and decreasing your risk for diseases such as diabetes and heart disease. It may help keep your arteries open so that your heart can get the oxygen-rich blood it needs. All in all, losing weight makes you healthier and is one of the best ways to improve your heart's health.    Calories and weight loss    Calories are the fuel your body burns for energy. You get the calories you need from the food you eat. For healthy weight loss, women should eat at least 1,200 calories a day, men at least 1,500.    When you eat more calories than you need, your body stores the extra calories as fat. One pound (0.45 kg) of fat equals 3,500 calories.    To lose weight, try to reduce your total calorie intake by 500 calories. To do this, eat 250 calories less each day. Add activity to burn the other 250 calories. Walking 2.5 miles (4 km) burns about 250 calories. Other more intense activities can burn more calories in the time you spend doing them, such as swimming and running. It's important to understand that reducing calorie intake is much more effective at weight loss than is exercise.    Eat a variety of healthy foods to get the nutrients you need while you are cutting your calories to reduce your weight.    Tips for losing weight    Drink 8 to 10 glasses of water a day.    Don t skip meals. Instead, eat smaller portions.    Eat your meals earlier in the day.    Cut out sugary drinks such as soda and fruit juices.    Make your later meals lighter than your earlier meals.    Brisk activity is best  Brisk activity gets your heart pumping faster and it makes it healthier. It s also a great way to burn calories. In fact, your body may keep burning calories for hours after you stop a brisk activity:    Start by walking  10 minutes most days.    Add more time and speed to your walk. Build up as you feel able.    Aim for at least 150 minutes of moderate-intensity aerobic activity such as brisk walking or 75 minutes of vigorous aerobic activity such as swimming laps each week to get the most health benefits.    Get up a move and sit less. Sitting too much is linked with increased risk of heart disease. Moving more and sitting less can help cut this risk.    The most important part of the activity is that you break a sweat. This means your heart is working hard enough to burn fat.  [employer] last reviewed this educational content on 7/1/2019 2000-2020 [employer]. All rights reserved. This information is not intended as a substitute for professional medical care. Always follow your healthcare professional's instructions.

## 2021-04-07 NOTE — RESULT ENCOUNTER NOTE
Sharan Torres,    This note is to let you know that all of your lab test results came back normal.  Take good care of yourself, eat a healthy diet, and let me know if you have any questions.  I look forward to seeing you soon.    Darshana MEZA CNP

## 2021-04-07 NOTE — PROGRESS NOTES
"    Assessment & Plan     (F32.0) Mild major depression (H)  (primary encounter diagnosis)  Comment: Stable  Plan: sertraline (ZOLOFT) 50 MG tablet        Melissa was very bright today.  I did go ahead and refill her sertraline.  I encouraged her to take her medication daily as prescribed, take good care of her self.  Next follow-up with me in 1 year, sooner if breakthrough symptoms or problems.      (E66.9) Obesity (BMI 30.0-34.9)  Comment:   Plan: Comprehensive metabolic panel, Lipid panel         reflex to direct LDL Fasting, TSH with free T4         reflex, Vitamin D Deficiency, Hemoglobin        For today, I did tell the patient I would do a comprehensive metabolic panel.  In the event that anything is abnormal, I will let her know she will be treated.  Otherwise if her labs are normal, I talked to her about eating a healthy diet, smaller portions, fewer calories, more exercise etc.  She agrees and understands.  I did tell her that any amount of weight loss is a benefit for her.  She agrees.  Emotional support was given today.    (Z13.1) Screening for diabetes mellitus  Comment:   Plan: Comprehensive metabolic panel        Done today    (Z13.0) Screening for iron deficiency anemia  Comment:   Plan: Hemoglobin        Done today    (Z13.29) Screening for thyroid disorder  Comment:   Plan: TSH with free T4 reflex        Done today    (Z13.220) Screening for hyperlipidemia  Comment:   Plan: Lipid panel reflex to direct LDL Fasting        Done today    (Z13.21) Encounter for vitamin deficiency screening  Comment:   Plan: Vitamin D Deficiency        Done today         BMI:   Estimated body mass index is 33.78 kg/m  as calculated from the following:    Height as of this encounter: 1.651 m (5' 5\").    Weight as of this encounter: 92.1 kg (203 lb).   Weight management plan: Discussed healthy diet and exercise guidelines    See Patient Instructions    Return in about 1 year (around 4/7/2022) for Physical Exam, Medication " "follow up.    SUSANA Kim CNP  [facility]    Mirlande Torres is a 30 year old who presents for the following health issues      HPI     Chief Complaint   Patient presents with     Weight Problem     difficulty losing weight     Depression     sertraline med refill     Weight:  Melissa is trying to lose weight and she is having difficulty.  Weight goal: 165#  \"a slow gain and I would like to get it going the other direction.\"  Today she is requesting a lab panel be done to rule out a metabolic issue.  \" If everything is normal, I just know I need to work harder.\"      Exercising:  Gardening, walking,  Yoga.  Planning to get back to swimming.      Sertraline:  Overall working well.  Getting frustrated with herself 1-2 times a month.  In September \"it was a much different story.\"  She does want to continue her sertraline and she is requesting for refills today.    Review of Systems   Constitutional, HEENT, cardiovascular, pulmonary, GI, , musculoskeletal, neuro, skin, endocrine and psych systems are negative, except as otherwise noted.      Objective    /70 (BP Location: Right arm, Patient Position: Sitting, Cuff Size: Adult Large)   Pulse 59   Temp 96.7  F (35.9  C) (Temporal)   Resp 16   Ht 1.651 m (5' 5\")   Wt 92.1 kg (203 lb)   LMP 03/26/2021 (Exact Date)   SpO2 98%   BMI 33.78 kg/m    Body mass index is 33.78 kg/m .  Physical Exam   GENERAL: healthy, alert and no distress  EYES: Eyes grossly normal to inspection, PERRL and conjunctivae and sclerae normal  NECK: no adenopathy, no asymmetry, masses, or scars and thyroid normal to palpation  RESP: lungs clear to auscultation - no rales, rhonchi or wheezes  CV: regular rate and rhythm, normal S1 S2, no S3 or S4, no murmur, click or rub, no peripheral edema and peripheral pulses strong  MS: no gross musculoskeletal defects noted, no edema  SKIN: warm and dry  NEURO: Normal strength and tone, mentation " intact and speech normal  PSYCH: mentation appears normal, affect normal/bright    Diagnostics:  Reviewed in epic.  Labs ordered today.

## 2021-06-04 ENCOUNTER — MYC MEDICAL ADVICE (OUTPATIENT)
Dept: OBGYN | Facility: CLINIC | Age: 31
End: 2021-06-04

## 2021-06-04 DIAGNOSIS — Z87.42: Primary | ICD-10-CM

## 2021-06-04 NOTE — TELEPHONE ENCOUNTER
Pt last seen for VV on 12/22/2020 for menstrual abnormality.     Pt and  have been trying to conceive for past 8 months w/o success, pt aware this has not been 1 year yet. Pt's cycles have ranged from 24-27 days in length.    RN routing to provider for any additional advisement on conception at this time or if pt should schedule appt to f/u.    Judy Gracia RN on 6/4/2021 at 8:50 AM

## 2021-06-11 ENCOUNTER — TELEPHONE (OUTPATIENT)
Dept: FAMILY MEDICINE | Facility: CLINIC | Age: 31
End: 2021-06-11

## 2021-06-11 NOTE — TELEPHONE ENCOUNTER
RN called and relayed to pt OK for day 3 labs to be drawn on Monday. Pt states she does have a lab appt on Sunday and prefers to keep that one.    Patient verbalized understanding and agreed to plan.     Patient was given the opportunity to ask additional questions and have them answered. Patient had no further questions.     Judy Gracia RN on 6/11/2021 at 2:52 PM

## 2021-06-11 NOTE — TELEPHONE ENCOUNTER
Routing to Dr. Cramer's team for follow up before end of day 6/11/21.    Pt was instructed to have lab work done on the 3rd day of her menstrual cycle. Pt's menstrual cycle started today and therefore her 3rd day would be Sunday. She is not sure whether to plan to have labs drawn on Monday or what she should do. Earliest lab appointment she was offered was Monday afternoon.    Please call and inform pt next of steps for scheduling.     Dedra CONDE RN, BSN  [facility]

## 2021-06-13 DIAGNOSIS — Z87.42: ICD-10-CM

## 2021-06-13 LAB
ESTRADIOL SERPL-MCNC: 54 PG/ML
FSH SERPL-ACNC: 6.6 IU/L

## 2021-06-13 PROCEDURE — 83001 ASSAY OF GONADOTROPIN (FSH): CPT | Performed by: OBSTETRICS & GYNECOLOGY

## 2021-06-13 PROCEDURE — 36415 COLL VENOUS BLD VENIPUNCTURE: CPT | Performed by: OBSTETRICS & GYNECOLOGY

## 2021-06-13 PROCEDURE — 82670 ASSAY OF TOTAL ESTRADIOL: CPT | Performed by: OBSTETRICS & GYNECOLOGY

## 2021-07-01 DIAGNOSIS — Z87.42: ICD-10-CM

## 2021-07-01 LAB — PROGEST SERPL-MCNC: 5.7 NG/ML

## 2021-07-01 PROCEDURE — 36415 COLL VENOUS BLD VENIPUNCTURE: CPT | Performed by: OBSTETRICS & GYNECOLOGY

## 2021-07-01 PROCEDURE — 84144 ASSAY OF PROGESTERONE: CPT | Performed by: OBSTETRICS & GYNECOLOGY

## 2021-07-19 ENCOUNTER — MYC MEDICAL ADVICE (OUTPATIENT)
Dept: OBGYN | Facility: CLINIC | Age: 31
End: 2021-07-19

## 2021-07-21 NOTE — TELEPHONE ENCOUNTER
Pt called back stating that she was returning call to Dr. Cramer. Notified her that he sent a [employer] message to her. She asked to let him know that she has her phone with her now in case he would like to call her back.

## 2021-07-21 NOTE — TELEPHONE ENCOUNTER
Moises Torres  I attempted to call but was routed to voice mail.   I don't think you need to be too worried about multiples.  Yes, it is a risk, but I have not seen triplets (it could happen).  I would still attempt during the cycle and have the luteal phase progesterone drawn.    Let me know questions.   Later  Pete Cramer MD

## 2021-08-16 ENCOUNTER — LAB (OUTPATIENT)
Dept: LAB | Facility: CLINIC | Age: 31
End: 2021-08-16
Payer: COMMERCIAL

## 2021-08-16 DIAGNOSIS — Z87.42: ICD-10-CM

## 2021-08-16 LAB — PROGEST SERPL-MCNC: 8.8 NG/ML

## 2021-08-16 PROCEDURE — 36415 COLL VENOUS BLD VENIPUNCTURE: CPT

## 2021-08-16 PROCEDURE — 84144 ASSAY OF PROGESTERONE: CPT

## 2021-08-19 ENCOUNTER — MYC MEDICAL ADVICE (OUTPATIENT)
Dept: OBGYN | Facility: CLINIC | Age: 31
End: 2021-08-19

## 2021-08-19 DIAGNOSIS — Z87.42: Primary | ICD-10-CM

## 2021-08-19 RX ORDER — CLOMIPHENE CITRATE 50 MG/1
50 TABLET ORAL DAILY
Qty: 5 TABLET | Refills: 0 | Status: SHIPPED | OUTPATIENT
Start: 2021-08-19 | End: 2021-08-24

## 2021-08-19 NOTE — TELEPHONE ENCOUNTER
Pt missed her appt today. Pt last seen for VV on 12/22/2020, appt missed today rescheduled for 9/1/2021. Last progesterone drawn 8/16/2021.    Pt had negative pregnancy test today, currently on cycle day 24. Pt asking if she should plan on taking clomid again next cycle and next steps should clomid not help.    RN routing to provider for advisement.    Judy Gracia RN on 8/19/2021 at 9:30 AM

## 2021-08-25 ENCOUNTER — TELEPHONE (OUTPATIENT)
Dept: OBGYN | Facility: CLINIC | Age: 31
End: 2021-08-25

## 2021-08-25 NOTE — TELEPHONE ENCOUNTER
Returned call to patient. She is currently scheduled with Dr. Cramer on 9.1.21. Recommended keeping that appointment to discuss questions. Order any labs or tests that may be needed.    Informed prenatal vitamins with 300 mcg of DHA are recommended.    Carla Charles, CMA

## 2021-08-25 NOTE — TELEPHONE ENCOUNTER
I called patient on 08/19/21 and reviewed the progesterone level and the Clomid use.   The Clomid dose increased to 50 mg a day for 5 days.    She will keep annual exam  Pete Cramer MD

## 2021-08-25 NOTE — TELEPHONE ENCOUNTER
[facility]    Phone Message    May a detailed message be left on voicemail: yes     Reason for Call: Patient calling stating she just found out that she is pregnant and wanted to know what is the next step of when to come in. Patient also asking about prenatal vitamins and which ones to take. Please advise. Thank you    Action Taken: Message routed to:  [facility] p [phone]    Travel Screening: Not Applicable

## 2021-08-26 ENCOUNTER — MYC MEDICAL ADVICE (OUTPATIENT)
Dept: OBGYN | Facility: CLINIC | Age: 31
End: 2021-08-26

## 2021-08-26 DIAGNOSIS — Z87.59 HISTORY OF MISCARRIAGE: Primary | ICD-10-CM

## 2021-08-26 NOTE — TELEPHONE ENCOUNTER
LMP 2021, currently 4w2d. . Pt last seen 2020 for VV for menstrual irregularities.    Pt asking if she would be a candidate for progesterone or anything else she can do to help her pregnancy. Pt has appt scheduled for 2021.    RN routing to provider for advisement.    Judy Gracia RN on 2021 at 4:15 PM

## 2021-08-27 RX ORDER — PROGESTERONE 200 MG/1
CAPSULE ORAL
Qty: 30 CAPSULE | Refills: 3 | Status: SHIPPED | OUTPATIENT
Start: 2021-08-27

## 2021-09-01 ENCOUNTER — OFFICE VISIT (OUTPATIENT)
Dept: OBGYN | Facility: CLINIC | Age: 31
End: 2021-09-01
Payer: COMMERCIAL

## 2021-09-01 VITALS
OXYGEN SATURATION: 100 % | WEIGHT: 207 LBS | BODY MASS INDEX: 34.45 KG/M2 | SYSTOLIC BLOOD PRESSURE: 129 MMHG | DIASTOLIC BLOOD PRESSURE: 75 MMHG | HEART RATE: 86 BPM

## 2021-09-01 DIAGNOSIS — O09.291 CURRENT PREGNANCY IN FIRST TRIMESTER WITH HISTORY OF SPONTANEOUS ABORTION DURING PRIOR PREGNANCY: ICD-10-CM

## 2021-09-01 DIAGNOSIS — Z87.59 HISTORY OF MISCARRIAGE: ICD-10-CM

## 2021-09-01 DIAGNOSIS — N91.2 AMENORRHEA: Primary | ICD-10-CM

## 2021-09-01 LAB — HCG UR QL: POSITIVE

## 2021-09-01 PROCEDURE — 99214 OFFICE O/P EST MOD 30 MIN: CPT | Performed by: OBSTETRICS & GYNECOLOGY

## 2021-09-01 PROCEDURE — 81025 URINE PREGNANCY TEST: CPT | Performed by: OBSTETRICS & GYNECOLOGY

## 2021-09-01 RX ORDER — ASPIRIN 81 MG/1
81 TABLET ORAL DAILY
COMMUNITY

## 2021-09-01 RX ORDER — PRENATAL VIT/IRON FUM/FOLIC AC 27MG-0.8MG
1 TABLET ORAL DAILY
COMMUNITY

## 2021-09-01 NOTE — PROGRESS NOTES
Melissa is a 30 year old,  2 para 0020 who presents today with absence of menses. LMP was 2021.  She had home pregnancy test positive.  She used Clomid 25 mg for ovulation induction.   She has had some nausea, fatigue, breast tenderness.     OB History    Para Term  AB Living   3 0 0 0 2 0   SAB TAB Ectopic Multiple Live Births   2 0 0 0 0      # Outcome Date GA Lbr Abhishek/2nd Weight Sex Delivery Anes PTL Lv   3 Current            2 SAB 2018     SAB      1 2017               Past Medical History:   Diagnosis Date     Family history of breast cancer     mother     History of miscarriage 12/2017    x2       Past Surgical History:   Procedure Laterality Date     HC TOOTH EXTRACTION W/FORCEP         Allergies   Allergen Reactions     Dust Mites      Mold        Current Outpatient Medications   Medication Sig Dispense Refill     aspirin 81 MG EC tablet Take 81 mg by mouth daily       Cholecalciferol (VITAMIN D3) 50 MCG ( UT) CAPS        Prenatal Vit-Fe Fumarate-FA (PRENATAL MULTIVITAMIN W/IRON) 27-0.8 MG tablet Take 1 tablet by mouth daily       progesterone (PROMETRIUM) 200 MG capsule 1 tablet vaginally, every day. 30 capsule 3     sertraline (ZOLOFT) 50 MG tablet Take 1 tablet (50 mg) by mouth daily 90 tablet 3     clomiPHENE (CLOMID) 50 MG tablet Take 1 tablet (50 mg) by mouth daily for 5 days 5 tablet 0     clomiPHENE (CLOMID) 50 MG tablet Take 0.5 tablets (25 mg) by mouth daily for 5 days 3 tablet 0     CRANBERRY PO        loratadine (CLARITIN) 10 MG tablet Take 10 mg by mouth daily         Social History     Tobacco Use     Smoking status: Never Smoker     Smokeless tobacco: Never Used   Substance Use Topics     Alcohol use: Yes     Comment: 3-5 drinks per week, only 1 or 2 total in an evening.     Drug use: No       Family History   Problem Relation Age of Onset     Breast Cancer Mother 55        she did not have BRAC testing done.      Obesity Mother      Other - See Comments  Mother         6/2019: right parotid gland malignant neoplasm     Obesity Father      Depression Father      Hypertension Father      Hyperlipidemia Father      Dementia Maternal Grandmother      Arthritis Paternal Grandmother      Coronary Artery Disease Paternal Grandfather      Coronary Artery Disease Maternal Grandfather      Obesity Brother      Anxiety Disorder Brother          ROS:    4 point ROS including Respiratory, CV, GI and , other than that noted in the HPI and the PMH,  is negative     EXAM:  /75 (BP Location: Right arm, Cuff Size: Adult Large)   Pulse 86   Wt 93.9 kg (207 lb)   LMP 07/27/2021 (Exact Date)   SpO2 100%   Breastfeeding No   BMI 34.45 kg/m    General:  WNWD female, NAD  Alert  Oriented x 3  Gait:  Normal  Skin:  Normal skin turgor  HEENT:  NC/AT, EOMI  Abdomen:  Non-tender, non-distended.  Pelvic exam:  Not performed  Extremities:  No clubbing, no cyanosis and no edema.    Component      Latest Ref Rng & Units 9/1/2021   HCG Qual Urine      Negative Positive (A)     A: Missed Menses  Weeks gestation: 5.1weeks  ADALID: 05/03/2021    P: 1) pregnancy test is ordered.    2) schedule ultrasound for dating purposes.  She will check with insurance first and order has been placed.   3) Diet, exercise, work, and environmental hazards reviewed. Toxoplasmosis precautions. Discussed avoidance of tobacco, ETOH, and street drugs. Signs and symptoms to monitor for and report discussed. Will schedule first OB visit at 10-12 weeks gestation.     Total time preparing to see patient with reviewing prior encounter and labs, face to face time, education and counseling regarding first trimester anticipatory guidance, prenatal visit schedule, delivery hospital and coordinating care on the same calendar date: 35 minutes     Pete Cramer MD

## 2021-09-21 ENCOUNTER — MYC MEDICAL ADVICE (OUTPATIENT)
Dept: OBGYN | Facility: CLINIC | Age: 31
End: 2021-09-21

## 2021-09-22 ENCOUNTER — ANCILLARY PROCEDURE (OUTPATIENT)
Dept: ULTRASOUND IMAGING | Facility: CLINIC | Age: 31
End: 2021-09-22
Attending: OBSTETRICS & GYNECOLOGY
Payer: COMMERCIAL

## 2021-09-22 DIAGNOSIS — Z87.59 HISTORY OF MISCARRIAGE: ICD-10-CM

## 2021-09-22 PROCEDURE — 76801 OB US < 14 WKS SINGLE FETUS: CPT | Performed by: OBSTETRICS & GYNECOLOGY

## 2021-09-22 NOTE — TELEPHONE ENCOUNTER
, 8w1d.    Pt is currently taking Progesterone 200 mg.  Her insurance is requiring 90 day supply even though this is for pregnancy.    Pt is wondering if she will be taking the Progesterone for her entire pregnancy.  If not, how long will she need to take the Progesterone 200 mg if her pregnancy continues as planned?    Pt does have an US today, .    Routing to Dr. Cramer to see how long pt needs to be on Progesterone and if for 90 days or longer then to send in a new prescription with a 90 day supply.    Asiya Dominguez RN

## 2021-09-28 NOTE — TELEPHONE ENCOUNTER
I called patient initially at 1307.  I left a message I called.   I called again at 1701 and I talked with patient.  She has filled the medication for the 30 days and at that time she will discontinue.    Ultrasound films reviewed.  The result note is not in chart yet (exam 09/22)  She has appointment next week.   Pete Cramer MD

## 2021-09-28 NOTE — TELEPHONE ENCOUNTER
I routed Dr. Cramer a message on 9/22 and again yesterday with pt's below questions from my message with no response.    Will attempt to route to Dr. Cramer again.    Asiya Dominguez RN

## 2021-09-28 NOTE — TELEPHONE ENCOUNTER
Pt is returning call.  Pt asked about Progesterone.  Pt only takes as long as she is under 12 weeks.  Pt confirmed that she will fill one more time.         [facility] - [place], [place] - [address]    Pt thought she needed to take entire pregnancy, but she will fill for one more yohan of 30 days.

## 2021-10-02 ENCOUNTER — HEALTH MAINTENANCE LETTER (OUTPATIENT)
Age: 31
End: 2021-10-02

## 2021-10-05 ENCOUNTER — PRENATAL OFFICE VISIT (OUTPATIENT)
Dept: OBGYN | Facility: CLINIC | Age: 31
End: 2021-10-05
Payer: COMMERCIAL

## 2021-10-05 ENCOUNTER — TELEPHONE (OUTPATIENT)
Dept: INTERNAL MEDICINE | Facility: CLINIC | Age: 31
End: 2021-10-05

## 2021-10-05 VITALS
HEART RATE: 78 BPM | OXYGEN SATURATION: 98 % | BODY MASS INDEX: 35.45 KG/M2 | DIASTOLIC BLOOD PRESSURE: 76 MMHG | WEIGHT: 213 LBS | SYSTOLIC BLOOD PRESSURE: 109 MMHG

## 2021-10-05 DIAGNOSIS — O09.291 CURRENT PREGNANCY IN FIRST TRIMESTER WITH HISTORY OF SPONTANEOUS ABORTION DURING PRIOR PREGNANCY: Primary | ICD-10-CM

## 2021-10-05 LAB
ABO/RH(D): NORMAL
ANTIBODY SCREEN: NEGATIVE
BASOPHILS # BLD AUTO: 0 10E3/UL (ref 0–0.2)
BASOPHILS NFR BLD AUTO: 0 %
EOSINOPHIL # BLD AUTO: 0.1 10E3/UL (ref 0–0.7)
EOSINOPHIL NFR BLD AUTO: 1 %
ERYTHROCYTE [DISTWIDTH] IN BLOOD BY AUTOMATED COUNT: 14.2 % (ref 10–15)
HBV SURFACE AG SERPL QL IA: NONREACTIVE
HCT VFR BLD AUTO: 41.9 % (ref 35–47)
HCV AB SERPL QL IA: NONREACTIVE
HGB BLD-MCNC: 14.1 G/DL (ref 11.7–15.7)
HIV 1+2 AB+HIV1 P24 AG SERPL QL IA: NONREACTIVE
LYMPHOCYTES # BLD AUTO: 1.1 10E3/UL (ref 0.8–5.3)
LYMPHOCYTES NFR BLD AUTO: 18 %
MCH RBC QN AUTO: 31 PG (ref 26.5–33)
MCHC RBC AUTO-ENTMCNC: 33.7 G/DL (ref 31.5–36.5)
MCV RBC AUTO: 92 FL (ref 78–100)
MONOCYTES # BLD AUTO: 0.4 10E3/UL (ref 0–1.3)
MONOCYTES NFR BLD AUTO: 7 %
NEUTROPHILS # BLD AUTO: 4.8 10E3/UL (ref 1.6–8.3)
NEUTROPHILS NFR BLD AUTO: 74 %
PLATELET # BLD AUTO: 270 10E3/UL (ref 150–450)
RBC # BLD AUTO: 4.55 10E6/UL (ref 3.8–5.2)
SPECIMEN EXPIRATION DATE: NORMAL
T PALLIDUM AB SER QL: NONREACTIVE
WBC # BLD AUTO: 6.4 10E3/UL (ref 4–11)

## 2021-10-05 PROCEDURE — 86900 BLOOD TYPING SEROLOGIC ABO: CPT | Performed by: OBSTETRICS & GYNECOLOGY

## 2021-10-05 PROCEDURE — 87086 URINE CULTURE/COLONY COUNT: CPT | Performed by: OBSTETRICS & GYNECOLOGY

## 2021-10-05 PROCEDURE — 86803 HEPATITIS C AB TEST: CPT | Performed by: OBSTETRICS & GYNECOLOGY

## 2021-10-05 PROCEDURE — 86762 RUBELLA ANTIBODY: CPT | Performed by: OBSTETRICS & GYNECOLOGY

## 2021-10-05 PROCEDURE — 87340 HEPATITIS B SURFACE AG IA: CPT | Performed by: OBSTETRICS & GYNECOLOGY

## 2021-10-05 PROCEDURE — 86780 TREPONEMA PALLIDUM: CPT | Performed by: OBSTETRICS & GYNECOLOGY

## 2021-10-05 PROCEDURE — 85025 COMPLETE CBC W/AUTO DIFF WBC: CPT | Performed by: OBSTETRICS & GYNECOLOGY

## 2021-10-05 PROCEDURE — 86901 BLOOD TYPING SEROLOGIC RH(D): CPT | Performed by: OBSTETRICS & GYNECOLOGY

## 2021-10-05 PROCEDURE — 87389 HIV-1 AG W/HIV-1&-2 AB AG IA: CPT | Performed by: OBSTETRICS & GYNECOLOGY

## 2021-10-05 PROCEDURE — 86850 RBC ANTIBODY SCREEN: CPT | Performed by: OBSTETRICS & GYNECOLOGY

## 2021-10-05 PROCEDURE — 36415 COLL VENOUS BLD VENIPUNCTURE: CPT | Performed by: OBSTETRICS & GYNECOLOGY

## 2021-10-05 PROCEDURE — 99213 OFFICE O/P EST LOW 20 MIN: CPT | Performed by: OBSTETRICS & GYNECOLOGY

## 2021-10-05 NOTE — TELEPHONE ENCOUNTER
[facility] called     Pt had US done 9/22/21    Results are not available in chart     Sent message to see if US tech can look into this    Becca UPTON RN

## 2021-10-05 NOTE — PROGRESS NOTES
INITIAL OB ASSESSMENT............................................Date: 10/5/2021                            ---------------------    Name: Melissa Macdonald.......................................................................Plan Number: [id number]    Age: 31 year old   : 1990  Phone: [phone] (home)   Address: [address] [place] [address]    Marital Status:    Race/Ethnicity:   Occupation:  Marketing   Partner's Name:  Pawan, Partner's Occupation:  [employer], senior pharmacist      LMP:  Patient's LMP from OB Dating Form:  Patient's last menstrual period was 2021 (exact date).  Regular menses? yes  Menses every month, 24-25 day cycles.  Length of menses: 5 days    Obstetrical History  ===================  OB History    Para Term  AB Living   3 0 0 0 2 0   SAB TAB Ectopic Multiple Live Births   2 0 0 0 0      # Outcome Date GA Lbr Abhishek/2nd Weight Sex Delivery Anes PTL Lv   3 Current            2 2018               Past Medical History:  Past Medical History:   Diagnosis Date     Family history of breast cancer     mother     History of miscarriage 12/2017    x2       Past Surgical History:  Past Surgical History:   Procedure Laterality Date     HC TOOTH EXTRACTION W/FORCEP         Current Outpatient Medications   Medication Sig Dispense Refill     aspirin 81 MG EC tablet Take 81 mg by mouth daily       Cholecalciferol (VITAMIN D3) 50 MCG (2000) CAPS        Prenatal Vit-Fe Fumarate-FA (PRENATAL MULTIVITAMIN W/IRON) 27-0.8 MG tablet Take 1 tablet by mouth daily       progesterone (PROMETRIUM) 200 MG capsule 1 tablet vaginally, every day. 30 capsule 3     sertraline (ZOLOFT) 50 MG tablet Take 1 tablet (50 mg) by mouth daily 90 tablet 3     clomiPHENE (CLOMID) 50 MG tablet Take 1 tablet (50 mg) by mouth daily for 5 days 5 tablet 0     clomiPHENE (CLOMID) 50 MG tablet Take 0.5 tablets (25 mg) by  mouth daily for 5 days 3 tablet 0     CRANBERRY PO        loratadine (CLARITIN) 10 MG tablet Take 10 mg by mouth daily         Allergies   Allergen Reactions     Dust Mites      Mold        Social History     Socioeconomic History     Marital status:      Spouse name: Chu     Number of children: 0     Years of education: 18     Highest education level: Not on file   Occupational History     Occupation:      Comment: [place] [employer]   Tobacco Use     Smoking status: Never Smoker     Smokeless tobacco: Never Used   Substance and Sexual Activity     Alcohol use: Yes     Comment: 3-5 drinks per week, only 1 or 2 total in an evening.     Drug use: No     Sexual activity: Yes     Partners: Male     Birth control/protection: None     Comment: Trying for pregnancy again as of 1/1/2021.   Other Topics Concern     Parent/sibling w/ CABG, MI or angioplasty before 65F 55M? No   Social History Narrative     Not on file     Social Determinants of Health     Financial Resource Strain:      Difficulty of Paying Living Expenses:    Food Insecurity:      Worried About Running Out of Food in the Last Year:      Ran Out of Food in the Last Year:    Transportation Needs:      Lack of Transportation (Medical):      Lack of Transportation (Non-Medical):    Physical Activity:      Days of Exercise per Week:      Minutes of Exercise per Session:    Stress:      Feeling of Stress :    Social Connections:      Frequency of Communication with Friends and Family:      Frequency of Social Gatherings with Friends and Family:      Attends Judaism Services:      Active Member of Clubs or Organizations:      Attends Club or Organization Meetings:      Marital Status:    Intimate Partner Violence:      Fear of Current or Ex-Partner:      Emotionally Abused:      Physically Abused:      Sexually Abused:      , dog  Benign, No substance abuse, environmental exposures, mental health risks and No financial  concerns,pets. Partner support.       Family History   Problem Relation Age of Onset     Breast Cancer Mother 55        she did not have BRAC testing done.      Obesity Mother      Other - See Comments Mother         6/2019: right parotid gland malignant neoplasm     Obesity Father      Depression Father      Hypertension Father      Hyperlipidemia Father      Dementia Maternal Grandmother      Arthritis Paternal Grandmother      Coronary Artery Disease Paternal Grandfather      Coronary Artery Disease Maternal Grandfather      Obesity Brother      Anxiety Disorder Brother        Past Medical History of Father of Baby:   No significant medical history     No known genetic disease in patient's 1st or 2nd degree relatives  No known genetic diseases in the FOB's 1st or 2nd degree relatives      REVIEW OF SYMPTOMS:   History Since Last Menstrual Period:    nausea, vomiting, fatigue and breast tenderness       PHYSICAL EXAM:  /76 (BP Location: Right arm, Cuff Size: Adult Large)   Pulse 78   Wt 96.6 kg (213 lb)   LMP 07/27/2021 (Exact Date)   SpO2 98%   BMI 35.45 kg/m    General:  WNWD female, NAD  Oriented:  X 3  Alert  PSYCH:  mentation appears normal., affect and mood normal  HEENT:  NC/AT, EOMI  NECK:  Neck supple. No adenopathy. Thyroid symmetric, normal size,, Carotids without bruits.  HEART:  RRR  LUNGS:  Clear to auscultation.  Good respiratory effort  BREASTS: not performed   ABDOMEN: Benign, Soft, flat, non-tender, No masses, organomegaly and Bowel sounds normoactive FHM seen on bedside ultrasound  VULVA: no masses or lesions seen  BUS:  Bartholin's, Urethra, [place]'s normal  VAGINA:  No masses or lesions seen.   CERVIX:  Nulliparous, closed, mobile,  no discharge  UTERUS:  Normal shape, position and consistency and Nontender  ADNEXA:  No masses palpated, non-tender  EXTREMITIES:No cyanosis, clubbing, warm and well perfused and No edema   GAIT: normal including tandem walk, heel and toe walk.         Assessment:   IUP at 10 weeks     Plan:  Options for  testing for chromosomal and birth defects were discussed with the patient.  Diagnostic tests include CVS and Amniocentesis.  We discussed that these tests are definitive but invasive and do carry a risk of fetal loss.    Screening tests include nuchal translucency/blood marker testing in the first trimester and quad screening in the second trimester.  We discussed that these are screening tests and not diagnostic tests and that false positives and negatives are a distinct possibility.  The patient will discuss with  and decide.  They will be seeing [email or web address] due to insurance coverage, so she will talk further with them.   We discussed physician coverage, tertiary support, diet, exercise, weight gain, schedule of visits, routine and indicated ultrasounds, and childbirth education.   Labs done today  RTC 4 weeks    Pete Cramer MD

## 2021-10-06 LAB
BACTERIA UR CULT: NORMAL
RUBV IGG SERPL QL IA: 11.1 INDEX
RUBV IGG SERPL QL IA: POSITIVE

## 2023-01-14 ENCOUNTER — HEALTH MAINTENANCE LETTER (OUTPATIENT)
Age: 33
End: 2023-01-14

## 2023-07-22 ENCOUNTER — HEALTH MAINTENANCE LETTER (OUTPATIENT)
Age: 33
End: 2023-07-22

## 2024-03-06 NOTE — PROGRESS NOTES
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  LMP 2018.  She first started seeing me for her care in , early in her first pregnancy.  She initially had some pink tinge discharge that changed to some dark brown dry appearing blood, not enough to soil a liner.  She did not have any associated pain or cramping.  Her quantitative HCG was good.  The ultrasound showed a gestational sac with yolk sac,  But no fetal pole.  Subsequent ultrasounds did not show a FHR.  She eventually passed tissue over time, with no intervention.    Pathology Report                                  Case: [id number]                                  Authorizing Provider:  Franco Lovett, Collected:           2018 2035                                     MD                                                                           Ordering Location:     [facility]             Received:            2018 0740              Pathologist:           Demetrius Maciel MD                                                      Specimen:    Vagina                                                                                  Final Diagnosis     A) UTERINE CONTENTS, PASSED TISSUE:  1. Degenerating decidua and chorionic villi consistent with intrauterine products      of conception  2. Negative for somatic fetal tissue, grossly and microscopically  3. Negative for abnormal trophoblastic proliferation and malignancy     In May, 2018, she presented to see me with LMP 2018.  She had home pregnancy test that was positive.  She and her  purchased a home in [place] and were going to have the prenatal care close to home.  She had an ultrasound at 10 weeks gestation and a nonliving IUP at 6.1 weeks gestation was noted.  She was seen at [facility] in [place] and was noted initially to have an incomplete ab, that proceeded to a complete ab by the time she  Renae Simental  2004      S:  19 y.o. female here for follow up of miscarriage.   LMP 1/17/24. Was seen 1/17/24 at GYN office and prescribed sprintec. Did not start this due to delay in getting to pharmacy. She started the OCP on 2/17/2024 and subsequent developed pelvic cramping. She took a UPT, which was positive, and was subsequently seen in the ER. US showed no intrauterine gestation and probable right corpus luteum.   . Cramping resolved, however, bleeding began 2/26/24. Returned to ER. US showed no intrauterine gestation or adnexal mass, and hcg 718. Repeat HCG in ED the next day 561. Bleeding stopped a few days ago. No pelvic pain, cramping, or discharge.     Pregnancy unplanned but desired. Now would like to start birth control to prevent pregnancy until she is more ready. Does not have insurance.       Current Outpatient Medications:     norgestimate-ethinyl estradiol (Sprintec 28) 0.25-35 MG-MCG per tablet, Take 1 tablet by mouth daily (Patient not taking: Reported on 3/6/2024), Disp: 28 tablet, Rfl: 3    prenatal vitamin (CLASSIC FORMULA) 27-0.8 mg, Take 1 tablet by mouth in the morning for 14 days, Disp: 14 tablet, Rfl: 0  Social History     Socioeconomic History    Marital status: Single     Spouse name: Not on file    Number of children: Not on file    Years of education: Not on file    Highest education level: Not on file   Occupational History    Not on file   Tobacco Use    Smoking status: Never    Smokeless tobacco: Never   Vaping Use    Vaping status: Never Used   Substance and Sexual Activity    Alcohol use: Not Currently    Drug use: Never    Sexual activity: Yes     Partners: Male     Birth control/protection: Condom Male   Other Topics Concern    Not on file   Social History Narrative    Not on file     Social Determinants of Health     Financial Resource Strain: Low Risk  (1/18/2024)    Overall Financial Resource Strain (CARDIA)     Difficulty of Paying Living Expenses:  Not hard at all   Food Insecurity: No Food Insecurity (1/18/2024)    Hunger Vital Sign     Worried About Running Out of Food in the Last Year: Never true     Ran Out of Food in the Last Year: Never true   Transportation Needs: No Transportation Needs (1/18/2024)    PRAPARE - Transportation     Lack of Transportation (Medical): No     Lack of Transportation (Non-Medical): No   Physical Activity: Not on file   Stress: Not on file   Social Connections: Not on file   Intimate Partner Violence: Not on file   Housing Stability: Not on file     Family History   Problem Relation Age of Onset    Breast cancer Neg Hx     Colon cancer Neg Hx     Ovarian cancer Neg Hx     Cancer Neg Hx      Past Medical History:   Diagnosis Date    No known health problems        ROS:  Constitutional: Negative.  Genitourinary:  Negative for difficulty urinating, frequency, urgency, pelvic pain, vaginal discharge, odor or itching.      O:  Blood pressure 104/66, weight 107 kg (236 lb 3.2 oz), last menstrual period 01/17/2024, unknown if currently breastfeeding.    She appears well and is in no distress  Normocephalic, atraumatic.   Normal respiratory effort.   No focal neurological deficits.   Normal mood, affect, and behavior.     A/P:  Diagnoses and all orders for this visit:    Vaginal bleeding, first trimester vaginal bleeding; likely miscarriage  -     Ambulatory Referral to Obstetrics / Gynecology  -     hCG, quantitative; Future      Discussed the need for serial hcg to ensure normalization. Slip given.  Discussed ectopic pregnancies and miscarriage in detail today. Discussed that 1 in 4-5 women have had a miscarriage, that the most likely etiology is a genetic anomaly from the outset, rather than anything she could have controlled. We discussed criteria for diagnosis of miscarriage.   Contraceptive options reviewed. No contraindications to estrogen.   Has rx for ocp and f/u with gyn next month.   Discussed how to restart pill, time to  return to [facility] and she was seen on 06/13/2018.  Some POC removed from the cervix.    Specimen #: [id number]   Collected: 6/13/2018   Received: 6/14/2018   FINAL DIAGNOSIS:   Products of conception   - Degenerated products of conception   - No evidence of a molar pregnancy       Dr. Garcia ordered the following tests on 06/13/2018, to be checked:    Component      Latest Ref Rng & Units 8/3/2018   Cardiolipin Antibody IgG      0.0 - 19.9 GPL-U/mL <1.6   Cardiolipin Antibody IgM      0.0 - 19.9 MPL-U/mL 3.2   Lupus Result      NEG:Negative Negative   T4 Free      0.76 - 1.46 ng/dL 0.92   TSH      0.40 - 4.00 mU/L 0.93       Past Medical History:   Diagnosis Date     History of miscarriage 12/2017     Past Surgical History:   Procedure Laterality Date     HC TOOTH EXTRACTION W/FORCEP        No Known Allergies    Current Outpatient Prescriptions   Medication Sig Dispense Refill     doxylamine (UNISOM) 25 MG TABS tablet Take 25 mg by mouth At Bedtime       FOLIC ACID PO Take 1 mg by mouth daily       Prenatal Vit-Fe Fumarate-FA (PRENATAL MULTIVITAMIN PLUS IRON) 27-0.8 MG TABS per tablet Take 1 tablet by mouth daily       Pyridoxine HCl (VITAMIN B6 PO)        VITAMIN D, CHOLECALCIFEROL, PO Take 2,000 Units by mouth 2 times daily         Social History     Social History     Marital status:      Spouse name: Chu     Number of children: 0     Years of education: 18     Occupational History           [employer]     Social History Main Topics     Smoking status: Never Smoker     Smokeless tobacco: Never Used     Alcohol use Yes      Comment: 4-5 drinks a week      Drug use: No     Sexual activity: Yes     Partners: Male     Birth control/ protection: None     Other Topics Concern     Not on file     Social History Narrative       Family History   Problem Relation Age of Onset     Breast Cancer Mother 60     Obesity Mother      Obesity Father      Depression Father       Hypertension Father      Hyperlipidemia Father      Dementia Maternal Grandmother      Arthritis Paternal Grandmother      Coronary Artery Disease Paternal Grandfather      Unknown/Adopted Brother        Review of Systems:  10 point ROS of systems including Constitutional, Eyes, Respiratory, Cardiovascular, Gastroenterology, Genitourinary, Integumentary, Muscularskeletal, Psychiatric were all negative except for pertinent positives noted in my HPI and in the PMH.      Exam  /82 (BP Location: Right arm, Cuff Size: Adult Large)  Pulse 74  Wt 190 lb (86.2 kg)  LMP 2018 (Exact Date)  SpO2 100%  Breastfeeding? No  BMI 30.21 kg/m2  General:  WNWD female, NAD  Alert  Oriented x 3  Gait:  Normal  Skin:  Normal skin turgor  HEENT:  NC/AT, EOMI  Abdomen:  Non-tender, non-distended.  Pelvic exam:  Not performed  Extremities:  No clubbing, no cyanosis and no edema.      Assessment  Recurrent        Plan:  We discussed the miscarriages.    She has had partial evaluation of the miscarriages as noted above. The labs above appear in normal ranges.    We also reviewed other possible etiology to the miscarriages and additional labs will be ordered, in a sequential fashion from easier and less invasive, to more invasive and expense.   Almost all chromosomal abnormal conceptions spontaneously abort before 10 weeks gestation  In 4-8 % of couples, one or the other partner might have a chromosomal abnormality that increases the probability of abnormal chromosome for fetus, with balanced translocations the most common. These can be in either partner and both need chromosomal study to exclude the possibility.   The E2 and the FSH tests are ordered as future, as she is remote from day 3 lab draw.    The anti-phospholipid testing is ordered.    Anti beta 2 glycoprotein 1 antibody  Antiphospholipid syndrome: Antiphospholipids: Moderate increase of either IgG or IgM, Beta2 glycoprotein I is also considered sufficient  efficacy, use, and s/e.   Compliance with daily pill taking reinforced. Reviewed management for missed pills.   Reviewed possibility for irregular bleeding in first 3 months of pill use. Can call with concerns.  Condoms recommended for STD prevention and back up birth control.   Warning sings of use reviewed. She will report these immediately should they occur.   RTO for scheduled follow up, sooner PRN.       to establish the diagnosis. Abnormal labs must be observed on at least two separate occasions, at least 12 weeks apart. If this is repeatable and with the history of three or more unexplained consecutive early miscarriages, then this might be etiology.   Factor v :  thrombophilias may not cause an increase in pregnancy loss.   Activated protein c resistance  Glucose since she is not fasting, the Hgb A1C is ordered.   Prolactin  Luteal phase duration she will monitor the menstrual cycles.      The following tests are also advised.     Karyotype both partners normal karyotype does not entirely exclude genetic causes.   SIS might identify anatomic reasons    70-75% of women with unexplained recurrent pregnancy loss ultimately achieve a successful pregnancy    I recommend to consider seeing MFM for preconception counseling, and the genetics.  The genetics counseling might identify other chromosome abnormalities that might contribute to miscarriages.  The referral is placed.     MVI/folic acid recommended.     TT 50 min  CT and review of records and discussion of the options and plan, greater than 80%    Pete Cramer MD

## 2024-09-08 ENCOUNTER — HEALTH MAINTENANCE LETTER (OUTPATIENT)
Age: 34
End: 2024-09-08